# Patient Record
Sex: FEMALE | Race: WHITE | Employment: FULL TIME | URBAN - NONMETROPOLITAN AREA
[De-identification: names, ages, dates, MRNs, and addresses within clinical notes are randomized per-mention and may not be internally consistent; named-entity substitution may affect disease eponyms.]

---

## 2017-06-06 ENCOUNTER — HOSPITAL ENCOUNTER (OUTPATIENT)
Dept: GENERAL RADIOLOGY | Age: 72
Discharge: HOME | End: 2017-06-06
Payer: COMMERCIAL

## 2017-06-06 ENCOUNTER — HOSPITAL ENCOUNTER (OUTPATIENT)
Age: 72
Discharge: HOME | End: 2017-06-06
Payer: COMMERCIAL

## 2017-06-06 DIAGNOSIS — M17.12 OSTEOARTHRITIS OF LEFT KNEE, UNSPECIFIED OSTEOARTHRITIS TYPE: ICD-10-CM

## 2017-06-06 DIAGNOSIS — M25.561 RIGHT KNEE PAIN, UNSPECIFIED CHRONICITY: ICD-10-CM

## 2017-06-06 PROCEDURE — 73565 X-RAY EXAM OF KNEES: CPT

## 2017-12-22 ENCOUNTER — HOSPITAL ENCOUNTER (OUTPATIENT)
Age: 72
Discharge: HOME | End: 2017-12-22
Payer: COMMERCIAL

## 2017-12-22 LAB
FERRITIN: 153 UG/L (ref 13–150)
IRON SATURATION: 21 % (ref 20–55)
IRON: 60 UG/DL (ref 37–145)
MAGNESIUM: 2.3 MG/DL (ref 1.6–2.6)
THYROXINE, FREE: 1.51 NG/DL (ref 0.93–1.7)
TOTAL IRON BINDING CAPACITY: 287 UG/DL (ref 250–450)
TSH SERPL DL<=0.05 MIU/L-ACNC: 1.48 MIU/L (ref 0.3–5)
UNSATURATED IRON BINDING CAPACITY: 227.1 UG/DL (ref 112–347)

## 2017-12-22 PROCEDURE — 83735 ASSAY OF MAGNESIUM: CPT

## 2017-12-22 PROCEDURE — 83540 ASSAY OF IRON: CPT

## 2017-12-22 PROCEDURE — 83970 ASSAY OF PARATHORMONE: CPT

## 2017-12-22 PROCEDURE — 82728 ASSAY OF FERRITIN: CPT

## 2017-12-22 PROCEDURE — 83550 IRON BINDING TEST: CPT

## 2017-12-22 PROCEDURE — 84439 ASSAY OF FREE THYROXINE: CPT

## 2017-12-22 PROCEDURE — 84443 ASSAY THYROID STIM HORMONE: CPT

## 2017-12-22 PROCEDURE — 36415 COLL VENOUS BLD VENIPUNCTURE: CPT

## 2017-12-23 LAB — PTH INTACT: 40.31 PG/ML (ref 15–65)

## 2018-02-26 ENCOUNTER — HOSPITAL ENCOUNTER (OUTPATIENT)
Age: 73
Discharge: HOME | End: 2018-02-26
Payer: COMMERCIAL

## 2018-02-26 LAB
ABSOLUTE EOS #: 0.3 K/UL (ref 0–0.4)
ABSOLUTE IMMATURE GRANULOCYTE: ABNORMAL K/UL (ref 0–0.3)
ABSOLUTE LYMPH #: 2.3 K/UL (ref 1–4.8)
ABSOLUTE MONO #: 0.7 K/UL (ref 0–1)
ANION GAP SERPL CALCULATED.3IONS-SCNC: 10 MMOL/L (ref 9–17)
BASOPHILS # BLD: 1 % (ref 0–2)
BASOPHILS ABSOLUTE: 0.1 K/UL (ref 0–0.2)
BUN BLDV-MCNC: 39 MG/DL (ref 8–23)
CHLORIDE BLD-SCNC: 100 MMOL/L (ref 98–107)
CO2: 28 MMOL/L (ref 20–31)
CREAT SERPL-MCNC: 0.71 MG/DL (ref 0.5–0.9)
DIFFERENTIAL TYPE: ABNORMAL
EKG ATRIAL RATE: 64 BPM
EKG P AXIS: 37 DEGREES
EKG P-R INTERVAL: 168 MS
EKG Q-T INTERVAL: 418 MS
EKG QRS DURATION: 130 MS
EKG QTC CALCULATION (BAZETT): 431 MS
EKG R AXIS: -76 DEGREES
EKG T AXIS: 24 DEGREES
EKG VENTRICULAR RATE: 64 BPM
EOSINOPHILS RELATIVE PERCENT: 4 % (ref 0–8)
GFR AFRICAN AMERICAN: >60 ML/MIN
GFR NON-AFRICAN AMERICAN: >60 ML/MIN
GFR SERPL CREATININE-BSD FRML MDRD: NORMAL ML/MIN/{1.73_M2}
GFR SERPL CREATININE-BSD FRML MDRD: NORMAL ML/MIN/{1.73_M2}
HCT VFR BLD CALC: 36.6 % (ref 36–46)
HEMOGLOBIN: 12 G/DL (ref 12–16)
IMMATURE GRANULOCYTES: ABNORMAL %
LYMPHOCYTES # BLD: 26 % (ref 24–44)
MCH RBC QN AUTO: 32.4 PG (ref 26–34)
MCHC RBC AUTO-ENTMCNC: 32.8 G/DL (ref 31–37)
MCV RBC AUTO: 99 FL (ref 80–100)
MONOCYTES # BLD: 8 % (ref 0–12)
NRBC AUTOMATED: ABNORMAL PER 100 WBC
PDW BLD-RTO: 14.2 % (ref 12.1–15.2)
PLATELET # BLD: 234 K/UL (ref 140–450)
PLATELET ESTIMATE: ABNORMAL
PMV BLD AUTO: 8.2 FL (ref 6–12)
POTASSIUM SERPL-SCNC: 4.3 MMOL/L (ref 3.7–5.3)
RBC # BLD: 3.69 M/UL (ref 4–5.2)
RBC # BLD: ABNORMAL 10*6/UL
SEG NEUTROPHILS: 61 % (ref 36–66)
SEGMENTED NEUTROPHILS ABSOLUTE COUNT: 5.4 K/UL (ref 1.8–7.7)
SODIUM BLD-SCNC: 138 MMOL/L (ref 135–144)
WBC # BLD: 8.8 K/UL (ref 3.5–11)
WBC # BLD: ABNORMAL 10*3/UL

## 2018-02-26 PROCEDURE — 93005 ELECTROCARDIOGRAM TRACING: CPT

## 2018-02-26 PROCEDURE — 85025 COMPLETE CBC W/AUTO DIFF WBC: CPT

## 2018-02-26 PROCEDURE — 80051 ELECTROLYTE PANEL: CPT

## 2018-02-26 PROCEDURE — 82565 ASSAY OF CREATININE: CPT

## 2018-02-26 PROCEDURE — 84520 ASSAY OF UREA NITROGEN: CPT

## 2018-02-26 PROCEDURE — 36415 COLL VENOUS BLD VENIPUNCTURE: CPT

## 2018-04-12 ENCOUNTER — HOSPITAL ENCOUNTER (OUTPATIENT)
Dept: GENERAL RADIOLOGY | Age: 73
Discharge: HOME | End: 2018-04-14
Payer: COMMERCIAL

## 2018-04-12 ENCOUNTER — HOSPITAL ENCOUNTER (OUTPATIENT)
Age: 73
Discharge: HOME | End: 2018-04-14
Payer: COMMERCIAL

## 2018-04-12 DIAGNOSIS — M17.0 OSTEOARTHRITIS OF BOTH KNEES, UNSPECIFIED OSTEOARTHRITIS TYPE: ICD-10-CM

## 2018-04-12 PROCEDURE — 73560 X-RAY EXAM OF KNEE 1 OR 2: CPT

## 2018-06-18 ENCOUNTER — HOSPITAL ENCOUNTER (OUTPATIENT)
Age: 73
Discharge: HOME | End: 2018-06-18
Payer: COMMERCIAL

## 2018-06-18 DIAGNOSIS — M25.572 ACUTE LEFT ANKLE PAIN: ICD-10-CM

## 2018-06-18 LAB — URIC ACID: 5.7 MG/DL (ref 2.4–5.7)

## 2018-06-18 PROCEDURE — 84550 ASSAY OF BLOOD/URIC ACID: CPT

## 2018-06-18 PROCEDURE — 36415 COLL VENOUS BLD VENIPUNCTURE: CPT

## 2018-07-13 ENCOUNTER — HOSPITAL ENCOUNTER (OUTPATIENT)
Dept: VASCULAR LAB | Age: 73
Discharge: HOME | End: 2018-07-15
Payer: COMMERCIAL

## 2018-07-13 DIAGNOSIS — M79.604 PAIN IN BOTH LOWER EXTREMITIES: ICD-10-CM

## 2018-07-13 DIAGNOSIS — M79.605 PAIN IN BOTH LOWER EXTREMITIES: ICD-10-CM

## 2018-07-13 DIAGNOSIS — I73.9 CLAUDICATION (HCC): ICD-10-CM

## 2018-07-13 PROCEDURE — 93970 EXTREMITY STUDY: CPT

## 2018-07-13 PROCEDURE — 93923 UPR/LXTR ART STDY 3+ LVLS: CPT

## 2018-07-16 ENCOUNTER — HOSPITAL ENCOUNTER (OUTPATIENT)
Dept: VASCULAR LAB | Age: 73
Discharge: HOME | End: 2018-07-18
Payer: COMMERCIAL

## 2018-07-16 DIAGNOSIS — I70.223 ATHEROSCLEROSIS OF NATIVE ARTERIES OF EXTREMITIES WITH REST PAIN, BILATERAL LEGS (HCC): ICD-10-CM

## 2018-07-16 PROCEDURE — 93923 UPR/LXTR ART STDY 3+ LVLS: CPT

## 2018-10-31 ENCOUNTER — HOSPITAL ENCOUNTER (OUTPATIENT)
Age: 73
Discharge: HOME | End: 2018-10-31
Payer: COMMERCIAL

## 2018-10-31 DIAGNOSIS — E03.9 HYPOTHYROIDISM, UNSPECIFIED TYPE: ICD-10-CM

## 2018-10-31 DIAGNOSIS — I10 ESSENTIAL HYPERTENSION: Chronic | ICD-10-CM

## 2018-10-31 LAB
ABSOLUTE EOS #: 0.2 K/UL (ref 0–0.44)
ABSOLUTE IMMATURE GRANULOCYTE: <0.03 K/UL (ref 0–0.3)
ABSOLUTE LYMPH #: 1.29 K/UL (ref 1.1–3.7)
ABSOLUTE MONO #: 0.78 K/UL (ref 0.1–1.2)
ANION GAP SERPL CALCULATED.3IONS-SCNC: 10 MMOL/L (ref 9–17)
BASOPHILS # BLD: 1 % (ref 0–2)
BASOPHILS ABSOLUTE: 0.03 K/UL (ref 0–0.2)
BUN BLDV-MCNC: 28 MG/DL (ref 8–23)
BUN/CREAT BLD: 35 (ref 9–20)
CALCIUM SERPL-MCNC: 9.9 MG/DL (ref 8.6–10.4)
CHLORIDE BLD-SCNC: 102 MMOL/L (ref 98–107)
CO2: 29 MMOL/L (ref 20–31)
CREAT SERPL-MCNC: 0.8 MG/DL (ref 0.5–0.9)
DIFFERENTIAL TYPE: ABNORMAL
EOSINOPHILS RELATIVE PERCENT: 3 % (ref 1–4)
GFR AFRICAN AMERICAN: >60 ML/MIN
GFR NON-AFRICAN AMERICAN: >60 ML/MIN
GFR SERPL CREATININE-BSD FRML MDRD: ABNORMAL ML/MIN/{1.73_M2}
GFR SERPL CREATININE-BSD FRML MDRD: ABNORMAL ML/MIN/{1.73_M2}
GLUCOSE BLD-MCNC: 100 MG/DL (ref 70–99)
HCT VFR BLD CALC: 38.2 % (ref 36.3–47.1)
HEMOGLOBIN: 12.2 G/DL (ref 11.9–15.1)
IMMATURE GRANULOCYTES: 0 %
LYMPHOCYTES # BLD: 21 % (ref 24–43)
MCH RBC QN AUTO: 32.7 PG (ref 25.2–33.5)
MCHC RBC AUTO-ENTMCNC: 31.9 G/DL (ref 28.4–34.8)
MCV RBC AUTO: 102.4 FL (ref 82.6–102.9)
MONOCYTES # BLD: 13 % (ref 3–12)
NRBC AUTOMATED: 0 PER 100 WBC
PDW BLD-RTO: 12.7 % (ref 11.8–14.4)
PLATELET # BLD: 189 K/UL (ref 138–453)
PLATELET ESTIMATE: ABNORMAL
PMV BLD AUTO: 9.7 FL (ref 8.1–13.5)
POTASSIUM SERPL-SCNC: 4.4 MMOL/L (ref 3.7–5.3)
RBC # BLD: 3.73 M/UL (ref 3.95–5.11)
RBC # BLD: ABNORMAL 10*6/UL
SEG NEUTROPHILS: 62 % (ref 36–65)
SEGMENTED NEUTROPHILS ABSOLUTE COUNT: 3.88 K/UL (ref 1.5–8.1)
SODIUM BLD-SCNC: 141 MMOL/L (ref 135–144)
TSH SERPL DL<=0.05 MIU/L-ACNC: 1.75 MIU/L (ref 0.3–5)
WBC # BLD: 6.2 K/UL (ref 3.5–11.3)
WBC # BLD: ABNORMAL 10*3/UL

## 2018-10-31 PROCEDURE — 85025 COMPLETE CBC W/AUTO DIFF WBC: CPT

## 2018-10-31 PROCEDURE — 84443 ASSAY THYROID STIM HORMONE: CPT

## 2018-10-31 PROCEDURE — 80048 BASIC METABOLIC PNL TOTAL CA: CPT

## 2018-10-31 PROCEDURE — 36415 COLL VENOUS BLD VENIPUNCTURE: CPT

## 2019-03-04 ENCOUNTER — HOSPITAL ENCOUNTER (OUTPATIENT)
Age: 74
Discharge: HOME | End: 2019-03-06
Payer: COMMERCIAL

## 2019-03-04 ENCOUNTER — HOSPITAL ENCOUNTER (OUTPATIENT)
Dept: GENERAL RADIOLOGY | Age: 74
Discharge: HOME | End: 2019-03-06
Payer: COMMERCIAL

## 2019-03-04 DIAGNOSIS — M75.81 ROTATOR CUFF TENDINITIS, RIGHT: ICD-10-CM

## 2019-03-04 PROCEDURE — 73030 X-RAY EXAM OF SHOULDER: CPT

## 2019-05-08 ENCOUNTER — HOSPITAL ENCOUNTER (OUTPATIENT)
Dept: GENERAL RADIOLOGY | Age: 74
Discharge: HOME | End: 2019-05-10
Attending: ORTHOPAEDIC SURGERY
Payer: COMMERCIAL

## 2019-05-08 ENCOUNTER — HOSPITAL ENCOUNTER (OUTPATIENT)
Dept: PREADMISSION TESTING | Age: 74
Discharge: HOME | End: 2019-05-12
Attending: ORTHOPAEDIC SURGERY
Payer: COMMERCIAL

## 2019-05-08 VITALS
TEMPERATURE: 97.6 F | HEIGHT: 55 IN | RESPIRATION RATE: 20 BRPM | OXYGEN SATURATION: 99 % | WEIGHT: 160.3 LBS | DIASTOLIC BLOOD PRESSURE: 68 MMHG | BODY MASS INDEX: 37.1 KG/M2 | HEART RATE: 77 BPM | SYSTOLIC BLOOD PRESSURE: 123 MMHG

## 2019-05-08 LAB
ABSOLUTE EOS #: 0.18 K/UL (ref 0–0.44)
ABSOLUTE IMMATURE GRANULOCYTE: 0.04 K/UL (ref 0–0.3)
ABSOLUTE LYMPH #: 1.09 K/UL (ref 1.1–3.7)
ABSOLUTE MONO #: 0.95 K/UL (ref 0.1–1.2)
ANION GAP SERPL CALCULATED.3IONS-SCNC: 9 MMOL/L (ref 9–17)
BASOPHILS # BLD: 0 % (ref 0–2)
BASOPHILS ABSOLUTE: 0.03 K/UL (ref 0–0.2)
BUN BLDV-MCNC: 30 MG/DL (ref 8–23)
BUN/CREAT BLD: 45 (ref 9–20)
CALCIUM SERPL-MCNC: 9.1 MG/DL (ref 8.6–10.4)
CHLORIDE BLD-SCNC: 106 MMOL/L (ref 98–107)
CO2: 27 MMOL/L (ref 20–31)
CREAT SERPL-MCNC: 0.67 MG/DL (ref 0.5–0.9)
DIFFERENTIAL TYPE: ABNORMAL
EKG ATRIAL RATE: 83 BPM
EKG P AXIS: 77 DEGREES
EKG P-R INTERVAL: 168 MS
EKG Q-T INTERVAL: 388 MS
EKG QRS DURATION: 124 MS
EKG QTC CALCULATION (BAZETT): 455 MS
EKG R AXIS: -66 DEGREES
EKG T AXIS: 50 DEGREES
EKG VENTRICULAR RATE: 83 BPM
EOSINOPHILS RELATIVE PERCENT: 2 % (ref 1–4)
GFR AFRICAN AMERICAN: >60 ML/MIN
GFR NON-AFRICAN AMERICAN: >60 ML/MIN
GFR SERPL CREATININE-BSD FRML MDRD: ABNORMAL ML/MIN/{1.73_M2}
GFR SERPL CREATININE-BSD FRML MDRD: ABNORMAL ML/MIN/{1.73_M2}
GLUCOSE BLD-MCNC: 99 MG/DL (ref 70–99)
HCT VFR BLD CALC: 38.5 % (ref 36.3–47.1)
HEMOGLOBIN: 12 G/DL (ref 11.9–15.1)
IMMATURE GRANULOCYTES: 1 %
LYMPHOCYTES # BLD: 13 % (ref 24–43)
MCH RBC QN AUTO: 33.1 PG (ref 25.2–33.5)
MCHC RBC AUTO-ENTMCNC: 31.2 G/DL (ref 28.4–34.8)
MCV RBC AUTO: 106.4 FL (ref 82.6–102.9)
MONOCYTES # BLD: 11 % (ref 3–12)
NRBC AUTOMATED: 0 PER 100 WBC
PDW BLD-RTO: 14.3 % (ref 11.8–14.4)
PLATELET # BLD: 200 K/UL (ref 138–453)
PLATELET ESTIMATE: ABNORMAL
PMV BLD AUTO: 10 FL (ref 8.1–13.5)
POTASSIUM SERPL-SCNC: 4.4 MMOL/L (ref 3.7–5.3)
RBC # BLD: 3.62 M/UL (ref 3.95–5.11)
RBC # BLD: ABNORMAL 10*6/UL
SEG NEUTROPHILS: 73 % (ref 36–65)
SEGMENTED NEUTROPHILS ABSOLUTE COUNT: 6.32 K/UL (ref 1.5–8.1)
SODIUM BLD-SCNC: 142 MMOL/L (ref 135–144)
WBC # BLD: 8.6 K/UL (ref 3.5–11.3)
WBC # BLD: ABNORMAL 10*3/UL

## 2019-05-08 PROCEDURE — 71046 X-RAY EXAM CHEST 2 VIEWS: CPT

## 2019-05-08 PROCEDURE — 93005 ELECTROCARDIOGRAM TRACING: CPT

## 2019-05-08 PROCEDURE — 36415 COLL VENOUS BLD VENIPUNCTURE: CPT

## 2019-05-08 PROCEDURE — 80048 BASIC METABOLIC PNL TOTAL CA: CPT

## 2019-05-08 PROCEDURE — 85025 COMPLETE CBC W/AUTO DIFF WBC: CPT

## 2019-05-08 RX ORDER — HYDROXYCHLOROQUINE SULFATE 200 MG/1
TABLET, FILM COATED ORAL DAILY
COMMUNITY
End: 2020-12-14 | Stop reason: ALTCHOICE

## 2019-05-08 RX ORDER — ANTIOX #8/OM3/DHA/EPA/LUT/ZEAX 250-2.5 MG
CAPSULE ORAL DAILY
COMMUNITY

## 2019-05-08 RX ORDER — ACETAMINOPHEN 500 MG
500 TABLET ORAL EVERY 6 HOURS PRN
COMMUNITY
End: 2022-09-09

## 2019-05-08 ASSESSMENT — PAIN SCALES - GENERAL: PAINLEVEL_OUTOF10: 9

## 2019-05-08 ASSESSMENT — PAIN DESCRIPTION - ORIENTATION: ORIENTATION: RIGHT

## 2019-05-08 ASSESSMENT — PAIN DESCRIPTION - PAIN TYPE: TYPE: CHRONIC PAIN

## 2019-05-08 ASSESSMENT — PAIN DESCRIPTION - LOCATION: LOCATION: SHOULDER;ARM

## 2019-05-08 NOTE — PROGRESS NOTES
Patient instructed on the pre-operative, intra-operative, and post-operative process. Patient instructed on NPO status. Medication instructions reviewed with patient. Pre operative instruction sheet reviewed and given to patient in PAT.

## 2019-05-22 RX ORDER — TRAMADOL HYDROCHLORIDE 50 MG/1
50 TABLET ORAL EVERY 6 HOURS PRN
COMMUNITY
End: 2021-12-21

## 2019-05-24 ENCOUNTER — OFFICE VISIT (OUTPATIENT)
Dept: CARDIOLOGY | Age: 74
End: 2019-05-24
Payer: COMMERCIAL

## 2019-05-24 VITALS
SYSTOLIC BLOOD PRESSURE: 131 MMHG | OXYGEN SATURATION: 97 % | HEIGHT: 55 IN | HEART RATE: 85 BPM | WEIGHT: 157.6 LBS | BODY MASS INDEX: 36.47 KG/M2 | RESPIRATION RATE: 16 BRPM | DIASTOLIC BLOOD PRESSURE: 76 MMHG

## 2019-05-24 DIAGNOSIS — I49.3 PVC'S (PREMATURE VENTRICULAR CONTRACTIONS): ICD-10-CM

## 2019-05-24 DIAGNOSIS — R94.31 ABNORMAL ECG: Primary | ICD-10-CM

## 2019-05-24 DIAGNOSIS — Z01.818 PRE-OPERATIVE CLEARANCE: ICD-10-CM

## 2019-05-24 DIAGNOSIS — I10 ESSENTIAL HYPERTENSION: ICD-10-CM

## 2019-05-24 PROCEDURE — G8427 DOCREV CUR MEDS BY ELIG CLIN: HCPCS | Performed by: INTERNAL MEDICINE

## 2019-05-24 PROCEDURE — G8417 CALC BMI ABV UP PARAM F/U: HCPCS | Performed by: INTERNAL MEDICINE

## 2019-05-24 PROCEDURE — 1090F PRES/ABSN URINE INCON ASSESS: CPT | Performed by: INTERNAL MEDICINE

## 2019-05-24 PROCEDURE — 99203 OFFICE O/P NEW LOW 30 MIN: CPT | Performed by: INTERNAL MEDICINE

## 2019-05-24 PROCEDURE — 93000 ELECTROCARDIOGRAM COMPLETE: CPT | Performed by: INTERNAL MEDICINE

## 2019-05-24 NOTE — PROGRESS NOTES
Hattie Kanner am scribing for and in the presence of Emily Hernández MD, F.A.C.C..      Patient: Wilman Navarro  : 1945  Date of Visit: May 24, 2019    REASON FOR VISIT / CONSULTATION: Establish Cardiologist (Referral for abnormal EKG. Hx: HTN, Hyperlipidemia, transient cerebral ischemia. Shoulder pain for 2 months. Denies: CP, SOB, lightheaded/dizziness, palpitations)      History of Present Illness:        Dear Matthew Velasco MD      I had the pleasure of seeing your patient Wilman Navarro in consultation today. As you know, Ms. Prateek Parra is a 66 y.o. female who presents with a history of PMH of hypertension, hypothyroidism, kidney stones and osteoarthritis who had a total left knee replacement back in . [place] She had surgery on her left hand last year. She is here for preoperative cardiac evaluation prior to her planned right shoulder surgery. Ms. Prateek Parra denies any known history of heart problems in the past including a known history of coronary artery disease, heart failure, heart attacks or arrhythmias. Her ECG in 3- showed normal sinus rhythm with left anterior fascicular block. Her ECG on 9- showed normal sinus rhythm with HR of 69 BPM, Nonspecific IVCD with QRS duration of 126 msc and one premature ventricular contraction. EKG done in office today 2019 showed  bifascicular block, QRS duration is 120 ms. Single premature ventricular contraction noted. This is grossly unchanged compared to prior ECGs. Ms. Prateek Parra is here as a referral from Matthew Velasco MD for an abnormal EKG. She does have arthritis and has shoulder pain. She is having surgey on her shoulder on  with Dr Allayne Bosworth at [facility]. She denied any current or recent chest pain, shortness of breath, abdominal pain, bleeding problems, problems with her medications or any other concerns at this time. She does not check her blood pressure at home.  She does not do much physical activity anymore because of her arthritis. PAST MEDICAL HISTORY:        Past Medical History:   Diagnosis Date    Arthritis     osteo    Edema     Hyperlipidemia     Hypertension     Hypothyroidism     Kidney stones     Osteoarthritis     Thyroid disease     Unspecified transient cerebral ischemia        CURRENT ALLERGIES: Patient has no known allergies. REVIEW OF SYSTEMS: 14 systems were reviewed. Pertinent positives and negatives as above, all else negative. Past Surgical History:   Procedure Laterality Date    APPENDECTOMY      CATARACT REMOVAL       SECTION      x4    CORNEAL TRANSPLANT      bilat    FINGER SURGERY Left 2018    INDEX FINGER    HAMMER TOE SURGERY      right and left    JOINT REPLACEMENT Left 10/15/14    TKA    KNEE ARTHROSCOPY Left     LITHOTRIPSY      TONSILLECTOMY      Social History:  Social History     Tobacco Use    Smoking status: Never Smoker    Smokeless tobacco: Never Used   Substance Use Topics    Alcohol use: No    Drug use: Never        CURRENT MEDICATIONS:        Outpatient Medications Marked as Taking for the 19 encounter (Office Visit) with Katiuska Bullock MD   Medication Sig Dispense Refill    lisinopril (PRINIVIL;ZESTRIL) 10 MG tablet Take 1 tablet by mouth daily 90 tablet 0    traMADol (ULTRAM) 50 MG tablet Take 50 mg by mouth every 6 hours as needed for Pain.       Calcium Carbonate (CALCIUM 600 PO) Take by mouth daily      Multiple Vitamins-Minerals (PRESERVISION AREDS 2) CAPS Take by mouth daily      acetaminophen (TYLENOL) 500 MG tablet Take 500 mg by mouth every 6 hours as needed for Pain      hydroxychloroquine (PLAQUENIL) 200 MG tablet Take by mouth daily      hydrochlorothiazide (HYDRODIURIL) 25 MG tablet Take 1 tablet by mouth every other day 15 tablet 0    levothyroxine (SYNTHROID) 100 MCG tablet Take 1 tablet by mouth Daily 90 tablet 0    meloxicam (MOBIC) 7.5 MG tablet Take 1 tablet by mouth daily 90 tablet 0    aspirin 81 MG chewable tablet Take 81 mg by mouth daily.  Cholecalciferol (VITAMIN D3) 2000 UNITS CAPS Take 1,000 Int'l Units/day by mouth. FAMILY HISTORY: family history includes Cancer in her father; Diabetes in her father, mother, and sister; Heart Disease in her sister; High Blood Pressure in her father and mother. Physical Examination:     /76 (Site: Left Upper Arm, Position: Sitting, Cuff Size: Medium Adult)   Pulse 85   Resp 16   Ht 4' 7\" (1.397 m)   Wt 157 lb 9.6 oz (71.5 kg)   SpO2 97%   BMI 36.63 kg/m²  Body mass index is 36.63 kg/m². Constitutional: She is oriented to person, place, and time. She appears well-developed and well-nourished. In no acute distress. HEENT: Normocephalic and atraumatic. No JVD present. Carotid bruit is not present. No mass and no thyromegaly present. No lymphadenopathy present. Cardiovascular: Normal rate, regular rhythm, normal heart sounds and intact distal pulses. Exam reveals no gallop and no friction rub. A I/VI systolic murmur was heard at the base of the heart without significant radiation. Pulmonary/Chest: Effort normal and breath sounds normal. No respiratory distress. She has no wheezes, rhonchi or rales. Abdominal: Soft, non-tender. Bowel sounds and aorta are normal. She exhibits no organomegaly, mass or bruit. Extremities: No edema. Joint deformities of arthritis noted. Neurological: She is alert and oriented to person, place, and time. No evidence of gross cranial nerve deficit. Coordination appeared normal.   Skin: Skin is warm and dry. There is no rash or diaphoresis. Psychiatric: She has a normal mood and affect.  Her speech is normal and behavior is normal.      MOST RECENT LABS ON RECORD:   Lab Results   Component Value Date    WBC 8.6 05/08/2019    HGB 12.0 05/08/2019    HCT 38.5 05/08/2019     05/08/2019    ALT 16 07/14/2016    AST 20 07/14/2016     05/08/2019    K 4.4 05/08/2019     05/08/2019    CREATININE 0.67 05/08/2019 BUN 30 (H) 05/08/2019    CO2 27 05/08/2019    TSH 1.75 10/31/2018       ASSESSMENT:     1. Abnormal ECG    2. PVC's (premature ventricular contractions)    3. Pre-operative clearance    4. Essential hypertension         PLAN:        · Pre-Op Clearance: low-intermediate risk of perioperative cardiac complications   Based on my evaluation of Ms. Tay, I do not believe that any further testing or intervention would be likely to decrease this risk and therefore recommend that you proceed with surgery as clinically indicated.  Medical management to reduce perioperative risk:   Antiplatelet Agent: Can hold aspirin for 7 days prior to surgery if needed.  Continue current antihypertensive therapy.  No history of heart disease, no diabetes or dyslipidemia.  No evidence of unstable CAD, heart failure or significant arrhythmia.  Her EKG is grossly unchanged from prior.  Chronic Frequent premature ventricular contractions (PVC's):   · Currently asymptomatic. · No need to start beta blocker therapy, this actually may worsen conduction block. · Additional Testing List: I ordered a echocardiogram to better assess for the etiology of this problem and to help guide future management and I ordered a 24 HOUR HOLTER MONITOR to try and pinpoint the etiology of the their symptoms       Essential Hypertension: Controlled  · ACE Inibitor/ARB: Continue lisinopril 10 mg daily. FOLLOW UP:   I told Ms. Tay to call my office if she had any problems, but otherwise I asked her to Return if symptoms worsen or fail to improve. However, I would be happy to see her sooner should the need arise. Once again, thank you for allowing me to participate in this patients care. Please do not hesitate to contact me could I be of further assistance. Sincerely,  Benji Gutierrez. Teo ARROYO, MS, F.A.C.C.   [facility] Cardiology Specialist    [address] [facility], [address] [place] [address]  Phone: [phone], Fax: [phone]     I believe that the risk of significant morbidity and mortality related to the patient's current medical conditions are: Intermediate. 40 minutes were spent with the patient and all of her questions were answered. The documentation recorded by the scribe, accurately and completely reflects the services I personally performed and the decisions made by me. Terri Titus MD, F.A.C.C.  May 24, 2019

## 2019-05-24 NOTE — PATIENT INSTRUCTIONS
SURVEY:    You may be receiving a survey from [employer] regarding your visit today. Please complete the survey to enable us to provide the highest quality of care to you and your family. If you cannot score us a very good on any question, please call the office to discuss how we could have made your experience a very good one. Thank you.

## 2019-05-28 ENCOUNTER — HOSPITAL ENCOUNTER (OUTPATIENT)
Dept: NON INVASIVE DIAGNOSTICS | Age: 74
Discharge: HOME | End: 2019-05-28
Payer: COMMERCIAL

## 2019-05-28 ENCOUNTER — ANESTHESIA EVENT (OUTPATIENT)
Dept: OPERATING ROOM | Age: 74
DRG: 483 | End: 2019-05-28
Payer: COMMERCIAL

## 2019-05-28 DIAGNOSIS — R94.31 ABNORMAL ECG: ICD-10-CM

## 2019-05-28 DIAGNOSIS — I49.3 PVC'S (PREMATURE VENTRICULAR CONTRACTIONS): ICD-10-CM

## 2019-05-28 LAB
LV EF: 65 %
LVEF MODALITY: NORMAL

## 2019-05-28 PROCEDURE — 93306 TTE W/DOPPLER COMPLETE: CPT

## 2019-05-28 PROCEDURE — 93226 XTRNL ECG REC<48 HR SCAN A/R: CPT

## 2019-05-28 PROCEDURE — 93225 XTRNL ECG REC<48 HRS REC: CPT

## 2019-05-28 NOTE — PROGRESS NOTES
Explained Holter monitor and diary. Explained policies and procedures of an echocardiogram/doppler study.

## 2019-05-31 ENCOUNTER — TELEPHONE (OUTPATIENT)
Dept: CARDIOLOGY | Age: 74
End: 2019-05-31

## 2019-05-31 NOTE — TELEPHONE ENCOUNTER
----- Message from Deep Valenzuela MD sent at 5/30/2019  7:17 PM EDT -----  Echo is okay. Good to go for surgery. Please call with questions and/or concerns.

## 2019-06-07 ENCOUNTER — HOSPITAL ENCOUNTER (OUTPATIENT)
Dept: LAB | Age: 74
Discharge: HOME | End: 2019-06-07
Payer: COMMERCIAL

## 2019-06-07 LAB
ABO/RH: NORMAL
ANTIBODY SCREEN: NEGATIVE
ARM BAND NUMBER: NORMAL
EXPIRATION DATE: NORMAL

## 2019-06-07 PROCEDURE — 86850 RBC ANTIBODY SCREEN: CPT

## 2019-06-07 PROCEDURE — 36415 COLL VENOUS BLD VENIPUNCTURE: CPT

## 2019-06-07 PROCEDURE — 86900 BLOOD TYPING SEROLOGIC ABO: CPT

## 2019-06-07 PROCEDURE — 86901 BLOOD TYPING SEROLOGIC RH(D): CPT

## 2019-06-11 ENCOUNTER — APPOINTMENT (OUTPATIENT)
Dept: GENERAL RADIOLOGY | Age: 74
DRG: 483 | End: 2019-06-11
Attending: ORTHOPAEDIC SURGERY
Payer: COMMERCIAL

## 2019-06-11 ENCOUNTER — HOSPITAL ENCOUNTER (INPATIENT)
Age: 74
LOS: 1 days | Discharge: HOME | DRG: 483 | End: 2019-06-12
Attending: ORTHOPAEDIC SURGERY | Admitting: ORTHOPAEDIC SURGERY
Payer: COMMERCIAL

## 2019-06-11 ENCOUNTER — ANESTHESIA (OUTPATIENT)
Dept: OPERATING ROOM | Age: 74
DRG: 483 | End: 2019-06-11
Payer: COMMERCIAL

## 2019-06-11 VITALS
TEMPERATURE: 97.8 F | SYSTOLIC BLOOD PRESSURE: 144 MMHG | DIASTOLIC BLOOD PRESSURE: 76 MMHG | OXYGEN SATURATION: 95 % | RESPIRATION RATE: 4 BRPM

## 2019-06-11 DIAGNOSIS — G89.18 POST-OP PAIN: Primary | ICD-10-CM

## 2019-06-11 PROBLEM — M19.011 PRIMARY OSTEOARTHRITIS OF RIGHT SHOULDER: Status: ACTIVE | Noted: 2019-06-11

## 2019-06-11 PROCEDURE — 1200000000 HC SEMI PRIVATE

## 2019-06-11 PROCEDURE — 0LS30ZZ REPOSITION RIGHT UPPER ARM TENDON, OPEN APPROACH: ICD-10-PCS | Performed by: ORTHOPAEDIC SURGERY

## 2019-06-11 PROCEDURE — 0PB90ZZ EXCISION OF RIGHT CLAVICLE, OPEN APPROACH: ICD-10-PCS | Performed by: ORTHOPAEDIC SURGERY

## 2019-06-11 PROCEDURE — 2580000003 HC RX 258: Performed by: ORTHOPAEDIC SURGERY

## 2019-06-11 PROCEDURE — 3600000014 HC SURGERY LEVEL 4 ADDTL 15MIN: Performed by: ORTHOPAEDIC SURGERY

## 2019-06-11 PROCEDURE — 6360000002 HC RX W HCPCS: Performed by: ORTHOPAEDIC SURGERY

## 2019-06-11 PROCEDURE — 64415 NJX AA&/STRD BRCH PLXS IMG: CPT | Performed by: NURSE ANESTHETIST, CERTIFIED REGISTERED

## 2019-06-11 PROCEDURE — 73020 X-RAY EXAM OF SHOULDER: CPT

## 2019-06-11 PROCEDURE — 6370000000 HC RX 637 (ALT 250 FOR IP): Performed by: ORTHOPAEDIC SURGERY

## 2019-06-11 PROCEDURE — 2500000003 HC RX 250 WO HCPCS: Performed by: NURSE ANESTHETIST, CERTIFIED REGISTERED

## 2019-06-11 PROCEDURE — 3700000000 HC ANESTHESIA ATTENDED CARE: Performed by: ORTHOPAEDIC SURGERY

## 2019-06-11 PROCEDURE — 7100000000 HC PACU RECOVERY - FIRST 15 MIN: Performed by: ORTHOPAEDIC SURGERY

## 2019-06-11 PROCEDURE — 6360000002 HC RX W HCPCS: Performed by: NURSE ANESTHETIST, CERTIFIED REGISTERED

## 2019-06-11 PROCEDURE — 2709999900 HC NON-CHARGEABLE SUPPLY: Performed by: ORTHOPAEDIC SURGERY

## 2019-06-11 PROCEDURE — 0RRJ00Z REPLACEMENT OF RIGHT SHOULDER JOINT WITH REVERSE BALL AND SOCKET SYNTHETIC SUBSTITUTE, OPEN APPROACH: ICD-10-PCS | Performed by: ORTHOPAEDIC SURGERY

## 2019-06-11 PROCEDURE — 0PUC07Z SUPPLEMENT RIGHT HUMERAL HEAD WITH AUTOLOGOUS TISSUE SUBSTITUTE, OPEN APPROACH: ICD-10-PCS | Performed by: ORTHOPAEDIC SURGERY

## 2019-06-11 PROCEDURE — 3600000004 HC SURGERY LEVEL 4 BASE: Performed by: ORTHOPAEDIC SURGERY

## 2019-06-11 PROCEDURE — 3700000001 HC ADD 15 MINUTES (ANESTHESIA): Performed by: ORTHOPAEDIC SURGERY

## 2019-06-11 PROCEDURE — 7100000001 HC PACU RECOVERY - ADDTL 15 MIN: Performed by: ORTHOPAEDIC SURGERY

## 2019-06-11 PROCEDURE — 97161 PT EVAL LOW COMPLEX 20 MIN: CPT

## 2019-06-11 PROCEDURE — C1776 JOINT DEVICE (IMPLANTABLE): HCPCS | Performed by: ORTHOPAEDIC SURGERY

## 2019-06-11 DEVICE — INSERT HUM DIA36MM THK+6MM B-12.5DEG SHLDR REVERSED: Type: IMPLANTABLE DEVICE | Site: SHOULDER | Status: FUNCTIONAL

## 2019-06-11 DEVICE — TRAY HUM THK+0MM 0MM OFFSET CNTR REVERSED AEQUALIS ASCEND: Type: IMPLANTABLE DEVICE | Site: SHOULDER | Status: FUNCTIONAL

## 2019-06-11 DEVICE — IMPLANTABLE DEVICE: Type: IMPLANTABLE DEVICE | Site: SHOULDER | Status: FUNCTIONAL

## 2019-06-11 RX ORDER — ANTIOX #8/OM3/DHA/EPA/LUT/ZEAX 250-2.5 MG
2 CAPSULE ORAL DAILY
Status: DISCONTINUED | OUTPATIENT
Start: 2019-06-11 | End: 2019-06-12 | Stop reason: HOSPADM

## 2019-06-11 RX ORDER — MORPHINE SULFATE 4 MG/ML
4 INJECTION, SOLUTION INTRAMUSCULAR; INTRAVENOUS
Status: DISCONTINUED | OUTPATIENT
Start: 2019-06-11 | End: 2019-06-12 | Stop reason: HOSPADM

## 2019-06-11 RX ORDER — BUPIVACAINE/KETOROLAC/KETAMINE 150-60/50
SYRINGE (ML) INJECTION
Status: DISCONTINUED
Start: 2019-06-11 | End: 2019-06-11 | Stop reason: WASHOUT

## 2019-06-11 RX ORDER — ROCURONIUM BROMIDE 10 MG/ML
INJECTION, SOLUTION INTRAVENOUS PRN
Status: DISCONTINUED | OUTPATIENT
Start: 2019-06-11 | End: 2019-06-11 | Stop reason: SDUPTHER

## 2019-06-11 RX ORDER — SODIUM CHLORIDE 0.9 % (FLUSH) 0.9 %
10 SYRINGE (ML) INJECTION EVERY 12 HOURS SCHEDULED
Status: DISCONTINUED | OUTPATIENT
Start: 2019-06-11 | End: 2019-06-12 | Stop reason: HOSPADM

## 2019-06-11 RX ORDER — TRANEXAMIC ACID 650 1/1
1950 TABLET ORAL ONCE
Status: COMPLETED | OUTPATIENT
Start: 2019-06-11 | End: 2019-06-11

## 2019-06-11 RX ORDER — HYDROCODONE BITARTRATE AND ACETAMINOPHEN 5; 325 MG/1; MG/1
1 TABLET ORAL EVERY 4 HOURS PRN
Status: ON HOLD | COMMUNITY
End: 2019-06-12 | Stop reason: SDUPTHER

## 2019-06-11 RX ORDER — SODIUM CHLORIDE 0.9 % (FLUSH) 0.9 %
10 SYRINGE (ML) INJECTION PRN
Status: DISCONTINUED | OUTPATIENT
Start: 2019-06-11 | End: 2019-06-12 | Stop reason: HOSPADM

## 2019-06-11 RX ORDER — SODIUM CHLORIDE 9 MG/ML
INJECTION, SOLUTION INTRAVENOUS CONTINUOUS
Status: DISCONTINUED | OUTPATIENT
Start: 2019-06-11 | End: 2019-06-12

## 2019-06-11 RX ORDER — MELOXICAM 7.5 MG/1
7.5 TABLET ORAL DAILY
Status: DISCONTINUED | OUTPATIENT
Start: 2019-06-11 | End: 2019-06-12 | Stop reason: HOSPADM

## 2019-06-11 RX ORDER — CEFAZOLIN SODIUM 1 G/3ML
INJECTION, POWDER, FOR SOLUTION INTRAMUSCULAR; INTRAVENOUS
Status: DISPENSED
Start: 2019-06-11 | End: 2019-06-12

## 2019-06-11 RX ORDER — ONDANSETRON 2 MG/ML
4 INJECTION INTRAMUSCULAR; INTRAVENOUS
Status: DISCONTINUED | OUTPATIENT
Start: 2019-06-11 | End: 2019-06-11 | Stop reason: HOSPADM

## 2019-06-11 RX ORDER — GLYCOPYRROLATE 1 MG/5 ML
SYRINGE (ML) INTRAVENOUS PRN
Status: DISCONTINUED | OUTPATIENT
Start: 2019-06-11 | End: 2019-06-11 | Stop reason: SDUPTHER

## 2019-06-11 RX ORDER — HYDROCHLOROTHIAZIDE 25 MG/1
25 TABLET ORAL EVERY OTHER DAY
Status: DISCONTINUED | OUTPATIENT
Start: 2019-06-11 | End: 2019-06-12 | Stop reason: HOSPADM

## 2019-06-11 RX ORDER — PROPOFOL 10 MG/ML
INJECTION, EMULSION INTRAVENOUS PRN
Status: DISCONTINUED | OUTPATIENT
Start: 2019-06-11 | End: 2019-06-11 | Stop reason: SDUPTHER

## 2019-06-11 RX ORDER — LEVOTHYROXINE SODIUM 0.1 MG/1
100 TABLET ORAL DAILY
Status: DISCONTINUED | OUTPATIENT
Start: 2019-06-11 | End: 2019-06-12 | Stop reason: HOSPADM

## 2019-06-11 RX ORDER — ROPIVACAINE HYDROCHLORIDE 5 MG/ML
INJECTION, SOLUTION EPIDURAL; INFILTRATION; PERINEURAL PRN
Status: DISCONTINUED | OUTPATIENT
Start: 2019-06-11 | End: 2019-06-11 | Stop reason: SDUPTHER

## 2019-06-11 RX ORDER — MORPHINE SULFATE 2 MG/ML
2 INJECTION, SOLUTION INTRAMUSCULAR; INTRAVENOUS
Status: DISCONTINUED | OUTPATIENT
Start: 2019-06-11 | End: 2019-06-12 | Stop reason: HOSPADM

## 2019-06-11 RX ORDER — MIDAZOLAM HYDROCHLORIDE 1 MG/ML
INJECTION INTRAMUSCULAR; INTRAVENOUS PRN
Status: DISCONTINUED | OUTPATIENT
Start: 2019-06-11 | End: 2019-06-11 | Stop reason: SDUPTHER

## 2019-06-11 RX ORDER — ONDANSETRON 2 MG/ML
4 INJECTION INTRAMUSCULAR; INTRAVENOUS EVERY 6 HOURS PRN
Status: DISCONTINUED | OUTPATIENT
Start: 2019-06-11 | End: 2019-06-12 | Stop reason: HOSPADM

## 2019-06-11 RX ORDER — DIMENHYDRINATE 50 MG/1
50 TABLET ORAL
Status: COMPLETED | OUTPATIENT
Start: 2019-06-11 | End: 2019-06-11

## 2019-06-11 RX ORDER — ACETAMINOPHEN 325 MG/1
650 TABLET ORAL EVERY 6 HOURS
Status: DISCONTINUED | OUTPATIENT
Start: 2019-06-11 | End: 2019-06-12 | Stop reason: HOSPADM

## 2019-06-11 RX ORDER — OXYCODONE HYDROCHLORIDE 5 MG/1
10 TABLET ORAL EVERY 4 HOURS PRN
Status: DISCONTINUED | OUTPATIENT
Start: 2019-06-11 | End: 2019-06-12 | Stop reason: HOSPADM

## 2019-06-11 RX ORDER — LISINOPRIL 10 MG/1
10 TABLET ORAL DAILY
Status: DISCONTINUED | OUTPATIENT
Start: 2019-06-11 | End: 2019-06-12 | Stop reason: HOSPADM

## 2019-06-11 RX ORDER — NEOSTIGMINE METHYLSULFATE 1 MG/ML
INJECTION, SOLUTION INTRAVENOUS PRN
Status: DISCONTINUED | OUTPATIENT
Start: 2019-06-11 | End: 2019-06-11 | Stop reason: SDUPTHER

## 2019-06-11 RX ORDER — LIDOCAINE HYDROCHLORIDE 20 MG/ML
INJECTION, SOLUTION EPIDURAL; INFILTRATION; INTRACAUDAL; PERINEURAL PRN
Status: DISCONTINUED | OUTPATIENT
Start: 2019-06-11 | End: 2019-06-11 | Stop reason: SDUPTHER

## 2019-06-11 RX ORDER — ACETAMINOPHEN 325 MG/1
650 TABLET ORAL ONCE
Status: CANCELLED | OUTPATIENT
Start: 2019-06-11 | End: 2019-06-11

## 2019-06-11 RX ORDER — DIMENHYDRINATE 50 MG/1
50 TABLET ORAL ONCE
Status: CANCELLED | OUTPATIENT
Start: 2019-06-11 | End: 2019-06-11

## 2019-06-11 RX ORDER — DOCUSATE SODIUM 100 MG/1
100 CAPSULE, LIQUID FILLED ORAL 2 TIMES DAILY
Status: DISCONTINUED | OUTPATIENT
Start: 2019-06-11 | End: 2019-06-12 | Stop reason: HOSPADM

## 2019-06-11 RX ORDER — SODIUM CHLORIDE, SODIUM LACTATE, POTASSIUM CHLORIDE, CALCIUM CHLORIDE 600; 310; 30; 20 MG/100ML; MG/100ML; MG/100ML; MG/100ML
INJECTION, SOLUTION INTRAVENOUS CONTINUOUS
Status: DISCONTINUED | OUTPATIENT
Start: 2019-06-11 | End: 2019-06-11

## 2019-06-11 RX ORDER — FENTANYL CITRATE 50 UG/ML
INJECTION, SOLUTION INTRAMUSCULAR; INTRAVENOUS PRN
Status: DISCONTINUED | OUTPATIENT
Start: 2019-06-11 | End: 2019-06-11 | Stop reason: SDUPTHER

## 2019-06-11 RX ORDER — DEXAMETHASONE SODIUM PHOSPHATE 10 MG/ML
INJECTION, SOLUTION INTRAMUSCULAR; INTRAVENOUS PRN
Status: DISCONTINUED | OUTPATIENT
Start: 2019-06-11 | End: 2019-06-11 | Stop reason: SDUPTHER

## 2019-06-11 RX ORDER — OXYCODONE HYDROCHLORIDE 5 MG/1
5 TABLET ORAL EVERY 4 HOURS PRN
Status: DISCONTINUED | OUTPATIENT
Start: 2019-06-11 | End: 2019-06-12 | Stop reason: HOSPADM

## 2019-06-11 RX ORDER — ACETAMINOPHEN 325 MG/1
650 TABLET ORAL ONCE
Status: COMPLETED | OUTPATIENT
Start: 2019-06-11 | End: 2019-06-11

## 2019-06-11 RX ORDER — FENTANYL CITRATE 50 UG/ML
25 INJECTION, SOLUTION INTRAMUSCULAR; INTRAVENOUS EVERY 5 MIN PRN
Status: DISCONTINUED | OUTPATIENT
Start: 2019-06-11 | End: 2019-06-11 | Stop reason: HOSPADM

## 2019-06-11 RX ADMIN — DIMENHYDRINATE 50 MG: 50 TABLET ORAL at 07:06

## 2019-06-11 RX ADMIN — DEXTROSE MONOHYDRATE 2 G: 50 INJECTION, SOLUTION INTRAVENOUS at 12:48

## 2019-06-11 RX ADMIN — Medication 0.2 MG: at 15:03

## 2019-06-11 RX ADMIN — PHENYLEPHRINE HYDROCHLORIDE 100 MCG: 10 INJECTION INTRAVENOUS at 14:35

## 2019-06-11 RX ADMIN — PHENYLEPHRINE HYDROCHLORIDE 100 MCG: 10 INJECTION INTRAVENOUS at 14:58

## 2019-06-11 RX ADMIN — PHENYLEPHRINE HYDROCHLORIDE 150 MCG: 10 INJECTION INTRAVENOUS at 13:51

## 2019-06-11 RX ADMIN — DEXAMETHASONE SODIUM PHOSPHATE 10 MG: 10 INJECTION, SOLUTION INTRAMUSCULAR; INTRAVENOUS at 11:48

## 2019-06-11 RX ADMIN — PHENYLEPHRINE HYDROCHLORIDE 100 MCG: 10 INJECTION INTRAVENOUS at 14:00

## 2019-06-11 RX ADMIN — PHENYLEPHRINE HYDROCHLORIDE 100 MCG: 10 INJECTION INTRAVENOUS at 14:37

## 2019-06-11 RX ADMIN — ACETAMINOPHEN 650 MG: 325 TABLET, FILM COATED ORAL at 17:27

## 2019-06-11 RX ADMIN — FENTANYL CITRATE 50 MCG: 50 INJECTION INTRAMUSCULAR; INTRAVENOUS at 13:01

## 2019-06-11 RX ADMIN — PHENYLEPHRINE HYDROCHLORIDE 100 MCG: 10 INJECTION INTRAVENOUS at 14:20

## 2019-06-11 RX ADMIN — PHENYLEPHRINE HYDROCHLORIDE 150 MCG: 10 INJECTION INTRAVENOUS at 13:41

## 2019-06-11 RX ADMIN — LISINOPRIL 10 MG: 10 TABLET ORAL at 20:26

## 2019-06-11 RX ADMIN — DOCUSATE SODIUM 100 MG: 100 CAPSULE, LIQUID FILLED ORAL at 20:27

## 2019-06-11 RX ADMIN — MIDAZOLAM HYDROCHLORIDE 1 MG: 1 INJECTION, SOLUTION INTRAMUSCULAR; INTRAVENOUS at 11:42

## 2019-06-11 RX ADMIN — PHENYLEPHRINE HYDROCHLORIDE 100 MCG: 10 INJECTION INTRAVENOUS at 14:26

## 2019-06-11 RX ADMIN — PHENYLEPHRINE HYDROCHLORIDE 100 MCG: 10 INJECTION INTRAVENOUS at 14:11

## 2019-06-11 RX ADMIN — MELOXICAM 7.5 MG: 7.5 TABLET ORAL at 20:27

## 2019-06-11 RX ADMIN — Medication 2 MG: at 15:03

## 2019-06-11 RX ADMIN — PROPOFOL 100 MG: 10 INJECTION, EMULSION INTRAVENOUS at 13:01

## 2019-06-11 RX ADMIN — PHENYLEPHRINE HYDROCHLORIDE 100 MCG: 10 INJECTION INTRAVENOUS at 14:02

## 2019-06-11 RX ADMIN — Medication 2 G: at 19:05

## 2019-06-11 RX ADMIN — HYDROCHLOROTHIAZIDE 25 MG: 25 TABLET ORAL at 20:26

## 2019-06-11 RX ADMIN — Medication 0.2 MG: at 14:14

## 2019-06-11 RX ADMIN — TRANEXAMIC ACID 1950 MG: 650 TABLET ORAL at 07:06

## 2019-06-11 RX ADMIN — PROPOFOL 30 MG: 10 INJECTION, EMULSION INTRAVENOUS at 14:49

## 2019-06-11 RX ADMIN — SODIUM CHLORIDE: 9 INJECTION, SOLUTION INTRAVENOUS at 16:27

## 2019-06-11 RX ADMIN — ACETAMINOPHEN 650 MG: 325 TABLET, FILM COATED ORAL at 22:51

## 2019-06-11 RX ADMIN — ROCURONIUM BROMIDE 40 MG: 10 INJECTION INTRAVENOUS at 13:02

## 2019-06-11 RX ADMIN — SODIUM CHLORIDE, POTASSIUM CHLORIDE, SODIUM LACTATE AND CALCIUM CHLORIDE: 600; 310; 30; 20 INJECTION, SOLUTION INTRAVENOUS at 10:25

## 2019-06-11 RX ADMIN — SODIUM CHLORIDE, POTASSIUM CHLORIDE, SODIUM LACTATE AND CALCIUM CHLORIDE: 600; 310; 30; 20 INJECTION, SOLUTION INTRAVENOUS at 14:05

## 2019-06-11 RX ADMIN — ROPIVACAINE HYDROCHLORIDE 20 ML: 5 INJECTION, SOLUTION EPIDURAL; INFILTRATION; PERINEURAL at 11:48

## 2019-06-11 RX ADMIN — Medication 10 ML: at 20:29

## 2019-06-11 RX ADMIN — LIDOCAINE HYDROCHLORIDE 50 MG: 20 INJECTION, SOLUTION EPIDURAL; INFILTRATION; INTRACAUDAL at 13:01

## 2019-06-11 RX ADMIN — ACETAMINOPHEN 650 MG: 325 TABLET, FILM COATED ORAL at 07:06

## 2019-06-11 RX ADMIN — PHENYLEPHRINE HYDROCHLORIDE 100 MCG: 10 INJECTION INTRAVENOUS at 13:30

## 2019-06-11 ASSESSMENT — PULMONARY FUNCTION TESTS
PIF_VALUE: 17
PIF_VALUE: 20
PIF_VALUE: 18
PIF_VALUE: 20
PIF_VALUE: 18
PIF_VALUE: 20
PIF_VALUE: 19
PIF_VALUE: 18
PIF_VALUE: 18
PIF_VALUE: 17
PIF_VALUE: 18
PIF_VALUE: 2
PIF_VALUE: 3
PIF_VALUE: 3
PIF_VALUE: 18
PIF_VALUE: 3
PIF_VALUE: 17
PIF_VALUE: 18
PIF_VALUE: 20
PIF_VALUE: 20
PIF_VALUE: 12
PIF_VALUE: 19
PIF_VALUE: 15
PIF_VALUE: 18
PIF_VALUE: 18
PIF_VALUE: 15
PIF_VALUE: 18
PIF_VALUE: 20
PIF_VALUE: 15
PIF_VALUE: 17
PIF_VALUE: 20
PIF_VALUE: 18
PIF_VALUE: 17
PIF_VALUE: 0
PIF_VALUE: 1
PIF_VALUE: 19
PIF_VALUE: 20
PIF_VALUE: 20
PIF_VALUE: 15
PIF_VALUE: 17
PIF_VALUE: 19
PIF_VALUE: 21
PIF_VALUE: 20
PIF_VALUE: 18
PIF_VALUE: 1
PIF_VALUE: 18
PIF_VALUE: 15
PIF_VALUE: 20
PIF_VALUE: 8
PIF_VALUE: 20
PIF_VALUE: 12
PIF_VALUE: 17
PIF_VALUE: 18
PIF_VALUE: 20
PIF_VALUE: 18
PIF_VALUE: 20
PIF_VALUE: 15
PIF_VALUE: 19
PIF_VALUE: 18
PIF_VALUE: 17
PIF_VALUE: 20
PIF_VALUE: 18
PIF_VALUE: 18
PIF_VALUE: 19
PIF_VALUE: 18
PIF_VALUE: 15
PIF_VALUE: 15
PIF_VALUE: 18
PIF_VALUE: 18
PIF_VALUE: 15
PIF_VALUE: 24
PIF_VALUE: 12
PIF_VALUE: 20
PIF_VALUE: 19
PIF_VALUE: 20
PIF_VALUE: 17
PIF_VALUE: 18
PIF_VALUE: 15
PIF_VALUE: 17
PIF_VALUE: 15
PIF_VALUE: 4
PIF_VALUE: 18
PIF_VALUE: 18
PIF_VALUE: 12
PIF_VALUE: 18
PIF_VALUE: 19
PIF_VALUE: 20
PIF_VALUE: 18
PIF_VALUE: 18
PIF_VALUE: 6
PIF_VALUE: 17
PIF_VALUE: 0
PIF_VALUE: 21
PIF_VALUE: 0
PIF_VALUE: 20
PIF_VALUE: 20
PIF_VALUE: 2
PIF_VALUE: 1
PIF_VALUE: 18
PIF_VALUE: 18
PIF_VALUE: 20
PIF_VALUE: 12
PIF_VALUE: 18
PIF_VALUE: 15
PIF_VALUE: 18
PIF_VALUE: 18
PIF_VALUE: 7
PIF_VALUE: 20
PIF_VALUE: 18
PIF_VALUE: 3
PIF_VALUE: 17
PIF_VALUE: 15
PIF_VALUE: 21
PIF_VALUE: 6
PIF_VALUE: 0
PIF_VALUE: 20
PIF_VALUE: 21
PIF_VALUE: 18
PIF_VALUE: 19
PIF_VALUE: 18
PIF_VALUE: 0
PIF_VALUE: 20
PIF_VALUE: 21
PIF_VALUE: 20
PIF_VALUE: 18
PIF_VALUE: 15

## 2019-06-11 ASSESSMENT — PAIN SCALES - GENERAL
PAINLEVEL_OUTOF10: 0
PAINLEVEL_OUTOF10: 5
PAINLEVEL_OUTOF10: 0
PAINLEVEL_OUTOF10: 10
PAINLEVEL_OUTOF10: 2
PAINLEVEL_OUTOF10: 0

## 2019-06-11 ASSESSMENT — PAIN - FUNCTIONAL ASSESSMENT: PAIN_FUNCTIONAL_ASSESSMENT: 0-10

## 2019-06-11 NOTE — PROGRESS NOTES
Pt instructed in use of IS. PVU and demonstrated good effort and technique. Pt achieved volume of approx 750 cc/breath, with good insp breath hold.   IS left at pt's bedside, and pt encouraged to use as prescribed

## 2019-06-11 NOTE — PROGRESS NOTES
Writer updated son several times throughout the morning in regards to his extended wait to see his mother prior to surgery start and also the delay in surgery start time.

## 2019-06-11 NOTE — ANESTHESIA POSTPROCEDURE EVALUATION
Department of Anesthesiology  Postprocedure Note    Patient: Sasha Weiss  MRN: [id number]  YOB: 1945  Date of evaluation: 6/11/2019  Time:  4:31 PM     Procedure Summary     Date:  06/11/19 Room / Location:  [address] AT [facility] OR    Anesthesia Start:  1255 Anesthesia Stop:      Procedure:  SHOULDER TOTAL ARTHROPLASTY, OPEN PARTIAL CLAVICULECTOMY (Right ) Diagnosis:  (RIGHT SHOULDER OSTEOARTHRITIS)    Surgeon:  Kimble Cockayne, MD Responsible Provider:  ЕЛЕНА Pugh CRNA    Anesthesia Type:  general ASA Status:  3          Anesthesia Type: general    Carmen Phase I: Carmen Score: 10    Carmen Phase II:      Last vitals: Reviewed and per EMR flowsheets.        Anesthesia Post Evaluation    Patient location during evaluation: PACU  Patient participation: complete - patient participated  Level of consciousness: awake and alert  Pain score: 0  Airway patency: patent  Nausea & Vomiting: no nausea and no vomiting  Complications: no  Cardiovascular status: blood pressure returned to baseline  Respiratory status: acceptable  Hydration status: euvolemic

## 2019-06-11 NOTE — OP NOTE
DATE OF SURGERY: 6/11/2019    PREOPERATIVE DIAGNOSIS:   1. Right shoulder primary glenohumeral osteoarthritis   2. Right shoulder primary acromioclavicular osteoarthritis     POSTOPERATIVE DIAGNOSIS:  Same    SURGERY:  1. Right reverse total shoulder arthroplasty   2. Right shoulder open bone autograft to glenoid (BIO-RSA)  3. Right open partial claviculectomy  4. Right shoulder open biceps tenodesis    SURGEON: Windy Hurley M.D.    ASSISTANT:  Jacqueline Villeda    ANESTHESIA: General with interscalene block     EBL: 879 mL    COMPLICATIONS: None    IMPLANTS:   [employer] [place] Aequalis Ascend Flex size 2B stem with centered reversed tray and standard 6 mm poly insert for 36 mm glenosphere. Aequalis Reversed II threaded post standard 25 mm baseplate with 30 mm screw, 36 mm centered glenosphere. INDICATIONS:    Patient presented with chronic, progressive shoulder pain and weakness. Clinical exam and imaging revealed severe arthritis. Patient attempted nonsurgical measures, but was unable to obtain meaningful relief. Therefore, patient elected to proceed with surgical intervention. Informed consent was obtained following discussion of risks and benefits    DESCRIPTION OF PROCEDURE:  Patient was identified in preop holding area. With the patient's agreement, the surgical site was marked. The patient was taken to the operating room and placed supine on the operative table. Prophylactic IV antibiotics were administered. General anesthesia was induced. Patient was then positioned in the beachchair position with the cervical spine in neutral alignment. All bony processes were padded. The operative extremity was then prepped and draped in usual sterile fashion. Preoperative timeout taken to confirm the proper patient, surgical site, and procedure. We then began by creating incision over the anterior aspect of the shoulder. Sharp dissection was carried down through the skin and subcutaneous fat.  Hemostasis was secured with electrocautery. Cephalic vein was identified and the deltopectoral interval was then bluntly developed. We then released the coracoacromial acromial ligament and 2-3 mm of lateral conjoint tendon at the coracoid process. Also released 4-5 mm of the proximal pectoralis major tendon. We then dissected subcutaneously up to [facility] joint. The joint capsule was incised longitudinally and the distal clavicle was exposed. With retractors in place, 1 cm of the distal clavicle was excised. The AC capsule was then closed with 0 Vicryl suture. We then returned to the deltopectoral interval.  Biceps tendon was identified at the superior margin of the pectoralis major tendon. It was fixed to the pec tendon with #2 Ethibond suture. The biceps was then transected above the pec tendon. We then followed the biceps tendon proximally into the bicipital groove, releasing the transverse humeral ligament and rotator interval.      Subcapularis was then released from the lesser tuberosity, and inferior capsule was released from the humeral neck. The humeral head was then dislocated anteriorly. The drill guide was then applied to the proximal humerus and drill pin was placed in the center of the humeral head. The humeral head was then reamed down to subchondral bone and a 29 mm diameter core was reamed. The cutting guide was applied and a 10 mm thick core of bone graft was harvested. The humeral head cut was then completed in 30 degrees retroversion with soft tissue retractors in place. Canal finder was then used to open the intramedullary canal of the humerus, and we sequentially broached to the appropriate size achieving excellent press fit with good rotational control. Retractors were then placed around the glenoid. The labrum was circumferentially excised. The anterior and inferior capsule was released from the glenoid. The patient specific drill guide was then used to place the central drill pin. We then reamed over the pin to bleeding subchondral bone. The baseplate was then opened and the bone graft dowel was placed on the backside of the baseplate. The baseplate was inserted with central compression screw achieving excellent purchase. Drill guide was then used to drill peripheral screw holes and locking screws were placed. Wound was then irrigated with sterile saline impregnated with antibiotic. The taper was dried and the glenosphere component was then inserted. We then turned our attention back to the humerus. Humeral tray and polyethylene trials were inserted to achieve appropriate tension with good range of motion. The trials were then removed and the final humeral components were then inserted achieving excellent press-fit with good rotational control. The shoulder was again reduced and taken through range of motion confirming appropriate tension. The wound was again irrigated. Subcutaneous layer was closed with 2-0 Vicryl sutures placed in inverted interrupted fashion and the skin was closed with staples. Sterile dressing was applied. The operative drapes were removed. Patient was placed into a shoulder immobilizer, was then returned to the supine position, and was awoken from anesthesia without complications. Patient was removed from the operating room table and taken to the recovery room in stable condition.

## 2019-06-11 NOTE — CONSULTS
Inpatient consult to Primary Care Provider  Consult performed by: Jessie Linn MD  Consult ordered by: MD Sanam Cardenas M.D. Inpatient Consult Note 19    Patient:  Nasra Trevino  MRN: [id number]    Reason for Consultation:  Post-operative medical management    Requesting Physician: Dr. Lillie Bamberger    History Obtained From:  patient, electronic medical record  PCP: Akilah Lawson MD    History of Present Illness: The patient is a 66 y.o. female who underwent an elective right reverse shoulder arthroplasty  by    for degenerative arthritis and pain which was uncontrolled with outpatient conservative management. Post-op course thus far has been uncomplicated. Her pain is controlled. Past Medical History:        Diagnosis Date    Arthritis     osteo    Edema     Hyperlipidemia     Hypertension     Hypothyroidism     Kidney stones     Osteoarthritis     Thyroid disease     Unspecified transient cerebral ischemia        Past Surgical History:        Procedure Laterality Date    APPENDECTOMY      CATARACT REMOVAL       SECTION      x4    CORNEAL TRANSPLANT      bilat    FINGER SURGERY Left 2018    INDEX FINGER    HAMMER TOE SURGERY      right and left    JOINT REPLACEMENT Left 10/15/14    TKA    KNEE ARTHROSCOPY Left     LITHOTRIPSY      SHOULDER ARTHROPLASTY Right 2019    Dr. Mary Barksdale         Medications Prior to Admission:    Prior to Admission medications    Medication Sig Start Date End Date Taking? Authorizing Provider   HYDROcodone-acetaminophen (NORCO) 5-325 MG per tablet Take 1 tablet by mouth every 4 hours as needed for Pain.    Yes Historical Provider, MD   hydrochlorothiazide (HYDRODIURIL) 25 MG tablet Take 1 tablet by mouth every other day 19  Yes Jessie Linn MD   lisinopril (PRINIVIL;ZESTRIL) 10 MG tablet Take 1 tablet by mouth daily 19  Yes Jessie Linn MD   traMADol (ULTRAM) 50 MG tablet Take 50 mg by mouth every 6 hours as needed for Pain. Yes Historical Provider, MD   acetaminophen (TYLENOL) 500 MG tablet Take 500 mg by mouth every 6 hours as needed for Pain   Yes Historical Provider, MD   levothyroxine (SYNTHROID) 100 MCG tablet Take 1 tablet by mouth Daily 4/23/19  Yes Christos Esquivel MD   Calcium Carbonate (CALCIUM 600 PO) Take by mouth daily    Historical Provider, MD   Multiple Vitamins-Minerals (PRESERVISION AREDS 2) CAPS Take by mouth daily    Historical Provider, MD   hydroxychloroquine (PLAQUENIL) 200 MG tablet Take by mouth daily    Historical Provider, MD   meloxicam (MOBIC) 7.5 MG tablet Take 1 tablet by mouth daily 3/22/19   Christos Esquviel MD   aspirin 81 MG chewable tablet Take 81 mg by mouth daily. Historical Provider, MD   Cholecalciferol (VITAMIN D3) 2000 UNITS CAPS Take 1,000 Int'l Units/day by mouth. Historical Provider, MD       Allergies:  Patient has no known allergies. Social History:   TOBACCO:   reports that she has never smoked. She has never used smokeless tobacco.  ETOH:   reports that she does not drink alcohol.     Family History:       Problem Relation Age of Onset    Diabetes Mother     High Blood Pressure Mother     Diabetes Father     Cancer Father         lung    High Blood Pressure Father     Diabetes Sister     Heart Disease Sister        Review of Systems:  Constitutional: negative for chills and fevers  ENT: negative for nasal congestion, sore throat and voice change  Respiratory: negative for shortness of breath or cough  Cardiovascular: negative for chest pain and palpitations  Gastrointestinal: negative for abdominal pain, nausea, vomiting, diarrhea or constipation  Genitourinary:negative for dysuria, urgency or frequency  Musculoskeletal: positive for joint pain  Skin: negative for rashes or skin lesions  Neurological: negative for weakness, numbness or tingling    Objective:    Vitals:   Vitals:    06/11/19 1630   BP: 115/65   Pulse: 72   Resp: 16   Temp:    SpO2: 96%     Weight: 160 lb (72.6 kg)   Height: 4' 7\" (139.7 cm)   -----------------------------------------------------------------  Exam:  GEN:   Awake, A+O x 3, no acute distress  Ears:  canals and TMs NI  Nose/Sinuses:  negative, Nares normal. Septum midline. Mucosa normal. No drainage or sinus tenderness. Mouth/Throat:  Mucosa moist.  No lesions. Pharynx without erythema, edema or exudate. NECK:  supple, no carotid bruit  PULM:  appears well, vitals normal, no respiratory distress, acyanotic, normal RR, ear and throat exam is normal, neck free of mass or lymphadenopathy, chest clear, no wheezing, crepitations, rhonchi, normal symmetric air entry  COR:  normal rate, normal S1 and S2, no gallops, intact distal pulses and no carotid bruits  ABD:   Positive BS, soft, non-distended, non-tender  EXT:    Pedal pulses are intact. No calf tenderness  NEURO: Motor and sensory grossly intact  SKIN:   Incision appears well approximated  Rt shoulder in sling  -----------------------------------------------------------------  Diagnostic Data:   · Reviewed    Assessment:      Status post 1. Right reverse total shoulder arthroplasty   2. Right shoulder open bone autograft to glenoid (BIO-RSA)  3. Right open partial claviculectomy  4.  Right shoulder open biceps tenodesis    ·   · HTN  · Hypothyroidims    Recommendation:     · Agree with post-op orders as written by Dr. Saul Rubio  · Agree with current post-op DVT prophylaxis orders  · Pain control as ordered per Dr. Saul Rubio  · Monitor bp  · Thank you for consultation and allowing to participate in the care of patient  · Will follow    Amauri Maldonado MD

## 2019-06-11 NOTE — PROGRESS NOTES
Physical Therapy    Facility/Department: [facility] MED SURG  Initial Assessment    NAME: Lisandra Magaña  : 1945  MRN: [id number]    Date of Service: 2019    Discharge Recommendations:  Continue to assess pending progress, Patient would benefit from continued therapy after discharge        Assessment   Assessment: Pt is a right-handed, 67 y/o female admitted for elective R reverse total shoulder arthroplasty. Pt currently requires CGA with bed mobility and transfers. Pt ambulates 5ft with quad cane and CGA. Pt has shoulder immobilizer donned throughout duration of evaluation. LE strength grossly 4/5. Pt will benefit from skilled PT services during hospital stay to improve strength, gait and functional mobility for safe return home. Treatment Diagnosis: generalized weakness  Prognosis: Good  Decision Making: Low Complexity  Patient Education: Purpose of PT eval and POC. REQUIRES PT FOLLOW UP: Yes  Activity Tolerance  Activity Tolerance: Patient Tolerated treatment well       Patient Diagnosis(es): There were no encounter diagnoses. has a past medical history of Arthritis, Edema, Hyperlipidemia, Hypertension, Hypothyroidism, Kidney stones, Osteoarthritis, Thyroid disease, and Unspecified transient cerebral ischemia. has a past surgical history that includes Lithotripsy; Knee arthroscopy (Left);  section; Tonsillectomy; Appendectomy; Hammer toe surgery; Cataract removal; Corneal transplant; joint replacement (Left, 10/15/14); Finger surgery (Left, 2018); and Shoulder Arthroplasty (Right, 2019).     Restrictions  Restrictions/Precautions  Restrictions/Precautions: General Precautions, Fall Risk, ROM Restrictions  Required Braces or Orthoses?: Yes  Required Braces or Orthoses  Right Upper Extremity Brace/Splint: (Immobilizer)  Vision/Hearing  Vision: Impaired  Vision Exceptions: Wears glasses at all times  Hearing: Within functional limits     Subjective  General  Patient assessed for in place    Goals  Short term goals  Time Frame for Short term goals: 10 days  Short term goal 1: Pt will be independent with bed mobility and transfers for improved functional independence. Short term goal 2: Pt will ambulate 150ft with Hong and no evidence of LOB or unsteadiness for safety with household and community ambulation. Short term goal 3: Pt will tolerate 20-30mins ther ex/act to improve endurance for ADLs and functional tasks.         Therapy Time   Individual Concurrent Group Co-treatment   Time In 1650         Time Out 1710         Minutes 20                 Iliana Bethea, MARYA

## 2019-06-11 NOTE — ANESTHESIA PROCEDURE NOTES
Peripheral Block    Patient location during procedure: pre-op  Start time: 6/11/2019 11:42 AM  End time: 6/11/2019 11:48 AM  Staffing  Resident/CRNA: ЕЛЕНА Guerrero CRNA  Performed: resident/CRNA   Preanesthetic Checklist  Completed: patient identified, site marked, surgical consent, pre-op evaluation, timeout performed, IV checked, risks and benefits discussed, monitors and equipment checked, anesthesia consent given, oxygen available and patient being monitored  Peripheral Block  Patient position: sitting (low semi fowlers)  Prep: ChloraPrep  Patient monitoring: continuous pulse ox, frequent blood pressure checks and IV access  Block type: Brachial plexus  Laterality: right  Injection technique: single-shot  Procedures: ultrasound guided  Local infiltration: ropivacaine and decadron  Infiltration strength: 0.5 %  Dose: 20 mL  Interscalene  Provider prep: mask and sterile gloves  Local infiltration: ropivacaine and decadron  Needle  Needle type: combined needle/nerve stimulator   Needle gauge: 22 G  Needle length: 5 cm  Needle localization: ultrasound guidance  Assessment  Injection assessment: negative aspiration for heme, no paresthesia on injection and local visualized surrounding nerve on ultrasound  Paresthesia pain: none  Slow fractionated injection: yes  Hemodynamics: stable  Reason for block: post-op pain management and at surgeon's request

## 2019-06-11 NOTE — ANESTHESIA PRE PROCEDURE
Department of Anesthesiology  Preprocedure Note       Name:  Sasha Weiss   Age:  66 y.o.  :  1945                                          MRN:  [id number]         Date:  2019      Surgeon: Simon Ivan):  Kimble Cockayne, MD    Procedure: SHOULDER TOTAL ARTHROPLASTY, OPEN PARTIAL CLAVICULECTOMY (Right )    Medications prior to admission:   Prior to Admission medications    Medication Sig Start Date End Date Taking? Authorizing Provider   HYDROcodone-acetaminophen (NORCO) 5-325 MG per tablet Take 1 tablet by mouth every 4 hours as needed for Pain. Yes Historical Provider, MD   hydrochlorothiazide (HYDRODIURIL) 25 MG tablet Take 1 tablet by mouth every other day 19  Yes Jarrett Joseph MD   lisinopril (PRINIVIL;ZESTRIL) 10 MG tablet Take 1 tablet by mouth daily 19  Yes Jarrett Joseph MD   traMADol (ULTRAM) 50 MG tablet Take 50 mg by mouth every 6 hours as needed for Pain. Yes Historical Provider, MD   acetaminophen (TYLENOL) 500 MG tablet Take 500 mg by mouth every 6 hours as needed for Pain   Yes Historical Provider, MD   levothyroxine (SYNTHROID) 100 MCG tablet Take 1 tablet by mouth Daily 19  Yes Jarrett Joseph MD   Calcium Carbonate (CALCIUM 600 PO) Take by mouth daily    Historical Provider, MD   Multiple Vitamins-Minerals (PRESERVISION AREDS 2) CAPS Take by mouth daily    Historical Provider, MD   hydroxychloroquine (PLAQUENIL) 200 MG tablet Take by mouth daily    Historical Provider, MD   meloxicam (MOBIC) 7.5 MG tablet Take 1 tablet by mouth daily 3/22/19   Jarrett Joseph MD   aspirin 81 MG chewable tablet Take 81 mg by mouth daily. Historical Provider, MD   Cholecalciferol (VITAMIN D3) 2000 UNITS CAPS Take 1,000 Int'l Units/day by mouth.     Historical Provider, MD       Current medications:    Current Facility-Administered Medications   Medication Dose Route Frequency Provider Last Rate Last Dose    ceFAZolin (ANCEF) 2 g in dextrose 5 % 100 mL IVPB  2 g Intravenous Once Britni Oviedo MD        lactated ringers infusion   Intravenous Continuous Britni Oviedo MD           Allergies:  No Known Allergies    Problem List:    Patient Active Problem List   Diagnosis Code    Transient cerebral ischemia G45.9    Essential hypertension I10    Hyperlipidemia E78.5    Primary osteoarthritis involving multiple joints M15.0    Hypothyroidism E03.9    Primary osteoarthritis of right shoulder M19.011       Past Medical History:        Diagnosis Date    Arthritis     osteo    Edema     Hyperlipidemia     Hypertension     Hypothyroidism     Kidney stones     Osteoarthritis     Thyroid disease     Unspecified transient cerebral ischemia        Past Surgical History:        Procedure Laterality Date    APPENDECTOMY      CATARACT REMOVAL       SECTION      x4    CORNEAL TRANSPLANT      bilat    FINGER SURGERY Left 2018    INDEX FINGER    HAMMER TOE SURGERY      right and left    JOINT REPLACEMENT Left 10/15/14    TKA    KNEE ARTHROSCOPY Left     LITHOTRIPSY      TONSILLECTOMY         Social History:    Social History     Tobacco Use    Smoking status: Never Smoker    Smokeless tobacco: Never Used   Substance Use Topics    Alcohol use:  No                                Counseling given: Not Answered      Vital Signs (Current):   Vitals:    19 0652   BP: 136/79   Pulse: 66   Resp: 18   Temp: 36.6 °C (97.8 °F)   TempSrc: Temporal   SpO2: 100%   Weight: 160 lb (72.6 kg)   Height: 4' 7\" (1.397 m)                                              BP Readings from Last 3 Encounters:   19 136/79   19 131/76   19 123/68       NPO Status: Time of last liquid consumption: 2300(  - 430 SIP WITH MEDS)                        Time of last solid consumption: 2200                        Date of last liquid consumption: 06/10/19                        Date of last solid food consumption: 06/10/19    BMI:   Wt Readings from Last 3 Encounters:   19 160 lb (72.6 kg)   05/24/19 157 lb 9.6 oz (71.5 kg)   05/08/19 160 lb 4.8 oz (72.7 kg)     Body mass index is 37.19 kg/m². CBC:   Lab Results   Component Value Date    WBC 8.6 05/08/2019    RBC 3.62 05/08/2019    HGB 12.0 05/08/2019    HCT 38.5 05/08/2019    .4 05/08/2019    RDW 14.3 05/08/2019     05/08/2019       CMP:   Lab Results   Component Value Date     05/08/2019    K 4.4 05/08/2019     05/08/2019    CO2 27 05/08/2019    BUN 30 05/08/2019    CREATININE 0.67 05/08/2019    GFRAA >60 05/08/2019    LABGLOM >60 05/08/2019    GLUCOSE 99 05/08/2019    PROT 6.8 07/14/2016    CALCIUM 9.1 05/08/2019    BILITOT 0.45 07/14/2016    ALKPHOS 58 07/14/2016    AST 20 07/14/2016    ALT 16 07/14/2016       POC Tests: No results for input(s): POCGLU, POCNA, POCK, POCCL, POCBUN, POCHEMO, POCHCT in the last 72 hours. Coags: No results found for: PROTIME, INR, APTT    HCG (If Applicable): No results found for: PREGTESTUR, PREGSERUM, HCG, HCGQUANT     ABGs: No results found for: PHART, PO2ART, GXI8LCH, BVY8YWT, BEART, U8CQHBWP     Type & Screen (If Applicable):  No results found for: [facility]    Anesthesia Evaluation  Patient summary reviewed and Nursing notes reviewed no history of anesthetic complications:   Airway: Mallampati: II  TM distance: >3 FB   Neck ROM: full  Mouth opening: > = 3 FB Dental:          Pulmonary:Negative Pulmonary ROS and normal exam                               Cardiovascular:  Exercise tolerance: poor (<4 METS), Patient does not do any yard work or walk up the stairs  (+) hypertension:, valvular problems/murmurs: AI and MR, hyperlipidemia      ECG reviewed          Cleared by cardiology           ROS comment: Summary  Global left ventricular systolic function appears preserved with an  estimated ejection fraction of >65%. The left ventricular cavity size is within normal limits and the left  ventricular wall thickness is mildly increased.   The patient has a sigmoid interventricular septum without evidence of  outflow tract obstruction. Aortic leaflets show calcification without restriction of motion. Mild  aortic insufficiency. Normal mitral valve structure with mild to moderate mitral regurgitation. Normal tricuspid valve structure with mild tricuspid regurgitation. Evidence of mild (grade I) diastolic dysfunction is seen. Neuro/Psych:      (-) TIA (patient denies)           GI/Hepatic/Renal:        (-) GERD       Endo/Other:    (+) hypothyroidism::., .                 Abdominal:   (+) obese,         Vascular: negative vascular ROS. Anesthesia Plan      general     ASA 3     (Agreed to regional blockk)  Induction: intravenous. MIPS: Prophylactic antiemetics administered. Anesthetic plan and risks discussed with patient.                       ЕЛЕНА Yeung - CRNA   6/11/2019

## 2019-06-11 NOTE — PROGRESS NOTES
Discussed discharge plans with the patient and son. Patient is a 66year old female here with 1. Right shoulder primary glenohumeral osteoarthritis and 2. Right shoulder primary acromioclavicular osteoarthritis . She is alert and oriented. Patient is a  and her son lives with her. She has a walker and cane if needed. Patient normally does her own cooking and cleaning. She manages her own medication and drives. Her PCP is Dr. Anne France. She has medical insurance that helps with medication costs. The discharge plan is home with the son's help and going to out-patient PT at [facility]. This was set up prior to surgery. Her PT appointment is for 6/13 @ 1 PM. She does not have advance directives and is not interested at this time. JOSELYNW to monitor and assist with any needs or concerns as they arise.     BILLY Casarez

## 2019-06-12 VITALS
BODY MASS INDEX: 37.58 KG/M2 | TEMPERATURE: 97.9 F | SYSTOLIC BLOOD PRESSURE: 97 MMHG | HEART RATE: 58 BPM | OXYGEN SATURATION: 97 % | HEIGHT: 55 IN | DIASTOLIC BLOOD PRESSURE: 54 MMHG | RESPIRATION RATE: 16 BRPM | WEIGHT: 162.4 LBS

## 2019-06-12 LAB
ANION GAP SERPL CALCULATED.3IONS-SCNC: 9 MMOL/L (ref 9–17)
BUN BLDV-MCNC: 19 MG/DL (ref 8–23)
BUN/CREAT BLD: 31 (ref 9–20)
CALCIUM SERPL-MCNC: 8.8 MG/DL (ref 8.6–10.4)
CHLORIDE BLD-SCNC: 105 MMOL/L (ref 98–107)
CO2: 26 MMOL/L (ref 20–31)
CREAT SERPL-MCNC: 0.62 MG/DL (ref 0.5–0.9)
GFR AFRICAN AMERICAN: >60 ML/MIN
GFR NON-AFRICAN AMERICAN: >60 ML/MIN
GFR SERPL CREATININE-BSD FRML MDRD: ABNORMAL ML/MIN/{1.73_M2}
GFR SERPL CREATININE-BSD FRML MDRD: ABNORMAL ML/MIN/{1.73_M2}
GLUCOSE BLD-MCNC: 128 MG/DL (ref 70–99)
HCT VFR BLD CALC: 31.8 % (ref 36.3–47.1)
HEMOGLOBIN: 10.1 G/DL (ref 11.9–15.1)
POTASSIUM SERPL-SCNC: 4 MMOL/L (ref 3.7–5.3)
SODIUM BLD-SCNC: 140 MMOL/L (ref 135–144)

## 2019-06-12 PROCEDURE — 85014 HEMATOCRIT: CPT

## 2019-06-12 PROCEDURE — 85018 HEMOGLOBIN: CPT

## 2019-06-12 PROCEDURE — 2580000003 HC RX 258: Performed by: ORTHOPAEDIC SURGERY

## 2019-06-12 PROCEDURE — 36415 COLL VENOUS BLD VENIPUNCTURE: CPT

## 2019-06-12 PROCEDURE — 97166 OT EVAL MOD COMPLEX 45 MIN: CPT

## 2019-06-12 PROCEDURE — 80048 BASIC METABOLIC PNL TOTAL CA: CPT

## 2019-06-12 PROCEDURE — 97530 THERAPEUTIC ACTIVITIES: CPT

## 2019-06-12 PROCEDURE — 6370000000 HC RX 637 (ALT 250 FOR IP): Performed by: ORTHOPAEDIC SURGERY

## 2019-06-12 PROCEDURE — 6360000002 HC RX W HCPCS: Performed by: ORTHOPAEDIC SURGERY

## 2019-06-12 RX ORDER — HYDROCODONE BITARTRATE AND ACETAMINOPHEN 5; 325 MG/1; MG/1
1 TABLET ORAL EVERY 4 HOURS PRN
Qty: 42 TABLET | Refills: 0 | Status: SHIPPED | OUTPATIENT
Start: 2019-06-12 | End: 2019-06-19

## 2019-06-12 RX ORDER — CEFAZOLIN SODIUM 1 G/3ML
INJECTION, POWDER, FOR SOLUTION INTRAMUSCULAR; INTRAVENOUS
Status: DISCONTINUED
Start: 2019-06-12 | End: 2019-06-12 | Stop reason: HOSPADM

## 2019-06-12 RX ORDER — ASPIRIN 325 MG
325 TABLET, DELAYED RELEASE (ENTERIC COATED) ORAL DAILY
Qty: 14 TABLET | Refills: 0 | Status: SHIPPED | OUTPATIENT
Start: 2019-06-12 | End: 2021-06-24

## 2019-06-12 RX ADMIN — Medication 2 G: at 02:30

## 2019-06-12 RX ADMIN — DOCUSATE SODIUM 100 MG: 100 CAPSULE, LIQUID FILLED ORAL at 07:57

## 2019-06-12 RX ADMIN — ACETAMINOPHEN 650 MG: 325 TABLET, FILM COATED ORAL at 04:40

## 2019-06-12 RX ADMIN — MELOXICAM 7.5 MG: 7.5 TABLET ORAL at 07:57

## 2019-06-12 RX ADMIN — LISINOPRIL 10 MG: 10 TABLET ORAL at 07:57

## 2019-06-12 RX ADMIN — LEVOTHYROXINE SODIUM 100 MCG: 100 TABLET ORAL at 07:57

## 2019-06-12 RX ADMIN — SODIUM CHLORIDE: 9 INJECTION, SOLUTION INTRAVENOUS at 02:27

## 2019-06-12 RX ADMIN — ASPIRIN 325 MG: 325 TABLET, DELAYED RELEASE ORAL at 07:58

## 2019-06-12 ASSESSMENT — PAIN SCALES - GENERAL
PAINLEVEL_OUTOF10: 5
PAINLEVEL_OUTOF10: 4
PAINLEVEL_OUTOF10: 4
PAINLEVEL_OUTOF10: 5

## 2019-06-12 NOTE — PROGRESS NOTES
Independent  Homemaking Assistance: Independent  Homemaking Responsibilities: Yes  Ambulation Assistance: Independent  Transfer Assistance: Independent  Active : Yes  Occupation: Full time employment( at [employer])       Objective   Vision: Impaired  Vision Exceptions: Wears glasses at all times  Hearing: Within functional limits    Orientation  Overall Orientation Status: Within Functional Limits  Observation/Palpation  Observation: Pt has shoulder immobilizer/ICE Man donned at time of eval; Pt. is R handed. ADL  Feeding: Independent  Grooming: Stand by assistance  UE Bathing: Minimal assistance  LE Bathing: Minimal assistance  UE Dressing: Moderate assistance  LE Dressing: Moderate assistance  Toileting: Minimal assistance        Transfers  Sit to stand: Contact guard assistance  Stand to sit: Contact guard assistance     Cognition  Overall Cognitive Status: WFL        Left Hand PROM (degrees)  Left Hand General PROM: reports hx L hand surgery and arthritis, unable to achieve full extension of fingers  RUE AROM (degrees)  RUE General AROM: shoulder immobilizer        Plan   Plan  Times per week: 7x/wk  Times per day: Daily  Current Treatment Recommendations: Strengthening, ROM, Functional Mobility Training, Safety Education & Training, Self-Care / ADL       Goals  Short term goals  Time Frame for Short term goals: 10 days  Short term goal 1: Patient will complete simple bathing/dressing/grooming tasks using one handed/adaptive techs with SBA to increase (I) with ADL's. Short term goal 2: Patient will complete toileting tasks with SUP with reduced risk for falls. Short term goal 3: Pt. will V/D G knowledge of rTSA precautions, one handed techs, AE/DME, safe transfers and fall prevention for safe return home.        Therapy Time   Individual Concurrent Group Co-treatment   Time In [facility]         Time Out 0853         Minutes [place], [place]

## 2019-06-12 NOTE — DISCHARGE SUMMARY
ADMISSION DIAGNOSIS:  Right shoulder primary osteoarthritis. PROCEDURES WHILE INPATIENT:  Right reverse total shoulder arthroplasty, right shoulder open bone autograft to glenoid (BIO-RSA), right open partial claviculectomy and right shoulder open biceps tenodesis performed on 6/11/2019. HOSPITAL COURSE:  The patient presented to the hospital on day of surgery. Surgery was completed without complications. Following surgery, the patient was admitted to regular nursing floor for postoperative pain control and physical therapy. The patient's was controlled with oral medications. Patient had pharmacologic and mechanical DVT prophylaxis. The patient began working with physical therapy and occupational therapy. Case management was consulted for assistance with discharge planning. The patient was deemed safe for discharge and was subsequently discharged following an uncomplicated postoperative course. DISPOSITION:  Home     CONDITION UPON DISCHARGE:  Stable. DISCHARGE MEDICATIONS:  The patient will resume home pre-hospital medication regimen. New medications include:   Norco 5/325 mg 1 tablets every 4 hours as needed for pain  Aspirin 325 mg daily. INSTRUCTIONS:  Keep sling in place except for hygiene. Keep dressing in place and incision dry. FOLLOW UP:  Follow up with Dr. Saul Rubio in 2 weeks. Follow up with outpatient physical therapy in 2-3 days as scheduled. Follow up with primary care physician within 1 month, or sooner as needed.

## 2019-06-12 NOTE — PROGRESS NOTES
Department of Orthopedic Surgery  Progress Note    Subjective:  No complaints. Doing well postoperatively. Overall, pain has been well controlled. Pain is perceived as moderate (4-6 pain scale). Has only been taking Tylenol. Pain is better with rest, immobilization and Tylenol. Pain worse with movement. Denies any numbness/tingling. No calf tenderness noted. Vitals  VITALS:  BP (!) 97/54   Pulse 58   Temp 97.9 °F (36.6 °C) (Temporal)   Resp 16   Ht 4' 7\" (1.397 m)   Wt 162 lb 6.4 oz (73.7 kg)   SpO2 97%   BMI 37.75 kg/m²     PHYSICAL EXAM:  General: in no apparent distress, well developed and well nourished, alert and oriented times 3  Right Upper Extremity  Incision:  Aquacel dressing in place, clean, dry and intact. Mild ecchymosis noted surrounding dressing. No erythema or abnormal warmth. Neurologic:  Moving upper extremity as appropriate following sugery. Motor function intact. Sensation grossly intact to light touch. Vascular: present 2+ radial pulse    Abnormal Exam findings:  none    LABS:  Hgb:    Lab Results   Component Value Date    HGB 12.0 05/08/2019       ASSESSMENT AND PLAN:  Post operative day 1 status post right reverse total shoulder arthroplasty, right shoulder open bone autograft to glenoid (BIO-RSA), right open partial claviculectomy and right shoulder open biceps tenodesis       1:  Continue sling  2:  Continue Deep venous thrombosis prophylaxis; will start on Aspirin 325 mg once daily  3:  Physical therapy. Has outpatient therapy scheduled for 6/13/19  4:  D/C Plan:  Home pending Hgb/Hct.   Patient lives at home with her son  5:  Continue Pain Control  6:  Keep 2 week f/u appt with Dr. Sara Cannon

## 2019-06-12 NOTE — PLAN OF CARE
Problem: Falls - Risk of:  Goal: Will remain free from falls  Description  Will remain free from falls  Outcome: Ongoing  Note:   Pt bed in lowest position with wheels locked. Call light within reach. Bed alarm in place. Room remains free of obstacles. Pt reminded to use call light as needed, verbalizes understanding. Will continue to monitor. Problem: Pain:  Goal: Control of acute pain  Description  Control of acute pain  Outcome: Ongoing  Note:   Pain assessments provided with interventions as appropriate.

## 2019-09-26 ENCOUNTER — HOSPITAL ENCOUNTER (OUTPATIENT)
Age: 74
Discharge: HOME | End: 2019-09-26
Payer: COMMERCIAL

## 2019-09-26 DIAGNOSIS — E03.9 HYPOTHYROIDISM, UNSPECIFIED TYPE: ICD-10-CM

## 2019-09-26 LAB — TSH SERPL DL<=0.05 MIU/L-ACNC: 0.19 MIU/L (ref 0.3–5)

## 2019-09-26 PROCEDURE — 84443 ASSAY THYROID STIM HORMONE: CPT

## 2019-09-26 PROCEDURE — 36415 COLL VENOUS BLD VENIPUNCTURE: CPT

## 2019-10-23 ENCOUNTER — HOSPITAL ENCOUNTER (OUTPATIENT)
Age: 74
Discharge: HOME | End: 2019-10-23
Payer: COMMERCIAL

## 2019-10-23 DIAGNOSIS — E03.9 HYPOTHYROIDISM, UNSPECIFIED TYPE: ICD-10-CM

## 2019-10-23 LAB — TSH SERPL DL<=0.05 MIU/L-ACNC: 0.64 MIU/L (ref 0.3–5)

## 2019-10-23 PROCEDURE — 84443 ASSAY THYROID STIM HORMONE: CPT

## 2019-10-23 PROCEDURE — 36415 COLL VENOUS BLD VENIPUNCTURE: CPT

## 2019-12-30 ENCOUNTER — HOSPITAL ENCOUNTER (OUTPATIENT)
Age: 74
Discharge: HOME | End: 2019-12-30
Payer: COMMERCIAL

## 2019-12-30 DIAGNOSIS — I10 ESSENTIAL HYPERTENSION: ICD-10-CM

## 2019-12-30 LAB
ANION GAP SERPL CALCULATED.3IONS-SCNC: 11 MMOL/L (ref 9–17)
BUN BLDV-MCNC: 38 MG/DL (ref 8–23)
BUN/CREAT BLD: 57 (ref 9–20)
CALCIUM SERPL-MCNC: 9.7 MG/DL (ref 8.6–10.4)
CHLORIDE BLD-SCNC: 101 MMOL/L (ref 98–107)
CO2: 26 MMOL/L (ref 20–31)
CREAT SERPL-MCNC: 0.67 MG/DL (ref 0.5–0.9)
GFR AFRICAN AMERICAN: >60 ML/MIN
GFR NON-AFRICAN AMERICAN: >60 ML/MIN
GFR SERPL CREATININE-BSD FRML MDRD: ABNORMAL ML/MIN/{1.73_M2}
GFR SERPL CREATININE-BSD FRML MDRD: ABNORMAL ML/MIN/{1.73_M2}
GLUCOSE BLD-MCNC: 90 MG/DL (ref 70–99)
POTASSIUM SERPL-SCNC: 3.8 MMOL/L (ref 3.7–5.3)
SODIUM BLD-SCNC: 138 MMOL/L (ref 135–144)

## 2019-12-30 PROCEDURE — 36415 COLL VENOUS BLD VENIPUNCTURE: CPT

## 2019-12-30 PROCEDURE — 80048 BASIC METABOLIC PNL TOTAL CA: CPT

## 2020-08-27 PROBLEM — E66.01 MORBIDLY OBESE (HCC): Status: ACTIVE | Noted: 2020-08-27

## 2020-09-02 ENCOUNTER — HOSPITAL ENCOUNTER (OUTPATIENT)
Age: 75
Discharge: HOME | End: 2020-09-02
Payer: COMMERCIAL

## 2020-09-02 LAB
ABSOLUTE EOS #: 0.19 K/UL (ref 0–0.44)
ABSOLUTE IMMATURE GRANULOCYTE: <0.03 K/UL (ref 0–0.3)
ABSOLUTE LYMPH #: 1.3 K/UL (ref 1.1–3.7)
ABSOLUTE MONO #: 0.65 K/UL (ref 0.1–1.2)
ALBUMIN SERPL-MCNC: 4 G/DL (ref 3.5–5.2)
ALBUMIN/GLOBULIN RATIO: 1.6 (ref 1–2.5)
ALP BLD-CCNC: 75 U/L (ref 35–104)
ALT SERPL-CCNC: 20 U/L (ref 5–33)
ANION GAP SERPL CALCULATED.3IONS-SCNC: 9 MMOL/L (ref 9–17)
AST SERPL-CCNC: 21 U/L
BASOPHILS # BLD: 0 % (ref 0–2)
BASOPHILS ABSOLUTE: <0.03 K/UL (ref 0–0.2)
BILIRUB SERPL-MCNC: 0.55 MG/DL (ref 0.3–1.2)
BUN BLDV-MCNC: 35 MG/DL (ref 8–23)
BUN/CREAT BLD: 38 (ref 9–20)
CALCIUM SERPL-MCNC: 9.5 MG/DL (ref 8.6–10.4)
CHLORIDE BLD-SCNC: 106 MMOL/L (ref 98–107)
CHOLESTEROL/HDL RATIO: 2.8
CHOLESTEROL: 148 MG/DL
CO2: 25 MMOL/L (ref 20–31)
CREAT SERPL-MCNC: 0.91 MG/DL (ref 0.5–0.9)
DIFFERENTIAL TYPE: ABNORMAL
EOSINOPHILS RELATIVE PERCENT: 3 % (ref 1–4)
GFR AFRICAN AMERICAN: >60 ML/MIN
GFR NON-AFRICAN AMERICAN: 60 ML/MIN
GFR SERPL CREATININE-BSD FRML MDRD: ABNORMAL ML/MIN/{1.73_M2}
GFR SERPL CREATININE-BSD FRML MDRD: ABNORMAL ML/MIN/{1.73_M2}
GLUCOSE BLD-MCNC: 98 MG/DL (ref 70–99)
HCT VFR BLD CALC: 36.5 % (ref 36.3–47.1)
HDLC SERPL-MCNC: 52 MG/DL
HEMOGLOBIN: 11.3 G/DL (ref 11.9–15.1)
IMMATURE GRANULOCYTES: 0 %
LDL CHOLESTEROL: 80 MG/DL (ref 0–130)
LYMPHOCYTES # BLD: 20 % (ref 24–43)
MCH RBC QN AUTO: 32.2 PG (ref 25.2–33.5)
MCHC RBC AUTO-ENTMCNC: 31 G/DL (ref 28.4–34.8)
MCV RBC AUTO: 104 FL (ref 82.6–102.9)
MONOCYTES # BLD: 10 % (ref 3–12)
NRBC AUTOMATED: 0 PER 100 WBC
PDW BLD-RTO: 13.6 % (ref 11.8–14.4)
PLATELET # BLD: 200 K/UL (ref 138–453)
PLATELET ESTIMATE: ABNORMAL
PMV BLD AUTO: 10.6 FL (ref 8.1–13.5)
POTASSIUM SERPL-SCNC: 4.1 MMOL/L (ref 3.7–5.3)
RBC # BLD: 3.51 M/UL (ref 3.95–5.11)
RBC # BLD: ABNORMAL 10*6/UL
SEG NEUTROPHILS: 67 % (ref 36–65)
SEGMENTED NEUTROPHILS ABSOLUTE COUNT: 4.44 K/UL (ref 1.5–8.1)
SODIUM BLD-SCNC: 140 MMOL/L (ref 135–144)
TOTAL PROTEIN: 6.5 G/DL (ref 6.4–8.3)
TRIGL SERPL-MCNC: 82 MG/DL
TSH SERPL DL<=0.05 MIU/L-ACNC: 1.01 MIU/L (ref 0.3–5)
VLDLC SERPL CALC-MCNC: NORMAL MG/DL (ref 1–30)
WBC # BLD: 6.6 K/UL (ref 3.5–11.3)
WBC # BLD: ABNORMAL 10*3/UL

## 2020-09-02 PROCEDURE — 80061 LIPID PANEL: CPT

## 2020-09-02 PROCEDURE — 36415 COLL VENOUS BLD VENIPUNCTURE: CPT

## 2020-09-02 PROCEDURE — 85025 COMPLETE CBC W/AUTO DIFF WBC: CPT

## 2020-09-02 PROCEDURE — 80053 COMPREHEN METABOLIC PANEL: CPT

## 2020-09-02 PROCEDURE — 84443 ASSAY THYROID STIM HORMONE: CPT

## 2020-10-08 ENCOUNTER — HOSPITAL ENCOUNTER (OUTPATIENT)
Age: 75
Discharge: HOME | End: 2020-10-08
Payer: COMMERCIAL

## 2020-10-08 LAB
ANION GAP SERPL CALCULATED.3IONS-SCNC: 10 MMOL/L (ref 9–17)
BUN BLDV-MCNC: 25 MG/DL (ref 8–23)
BUN/CREAT BLD: 32 (ref 9–20)
CALCIUM SERPL-MCNC: 9.6 MG/DL (ref 8.6–10.4)
CHLORIDE BLD-SCNC: 103 MMOL/L (ref 98–107)
CO2: 26 MMOL/L (ref 20–31)
CREAT SERPL-MCNC: 0.77 MG/DL (ref 0.5–0.9)
GFR AFRICAN AMERICAN: >60 ML/MIN
GFR NON-AFRICAN AMERICAN: >60 ML/MIN
GFR SERPL CREATININE-BSD FRML MDRD: ABNORMAL ML/MIN/{1.73_M2}
GFR SERPL CREATININE-BSD FRML MDRD: ABNORMAL ML/MIN/{1.73_M2}
GLUCOSE BLD-MCNC: 97 MG/DL (ref 70–99)
POTASSIUM SERPL-SCNC: 3.8 MMOL/L (ref 3.7–5.3)
SODIUM BLD-SCNC: 139 MMOL/L (ref 135–144)

## 2020-10-08 PROCEDURE — 36415 COLL VENOUS BLD VENIPUNCTURE: CPT

## 2020-10-08 PROCEDURE — 80048 BASIC METABOLIC PNL TOTAL CA: CPT

## 2020-12-14 ENCOUNTER — HOSPITAL ENCOUNTER (OUTPATIENT)
Age: 75
Discharge: HOME | End: 2020-12-14
Payer: COMMERCIAL

## 2020-12-14 LAB
ABSOLUTE EOS #: 0.3 K/UL (ref 0–0.44)
ABSOLUTE IMMATURE GRANULOCYTE: 0 K/UL (ref 0–0.3)
ABSOLUTE LYMPH #: 1.62 K/UL (ref 1.1–3.7)
ABSOLUTE MONO #: 0.91 K/UL (ref 0.1–1.2)
BASOPHILS # BLD: 0 % (ref 0–2)
BASOPHILS ABSOLUTE: 0 K/UL (ref 0–0.2)
DIFFERENTIAL TYPE: ABNORMAL
EOSINOPHILS RELATIVE PERCENT: 3 % (ref 1–4)
HCT VFR BLD CALC: 37.3 % (ref 36.3–47.1)
HEMOGLOBIN: 11.6 G/DL (ref 11.9–15.1)
IMMATURE GRANULOCYTES: 0 %
LYMPHOCYTES # BLD: 16 % (ref 24–43)
MCH RBC QN AUTO: 31.5 PG (ref 25.2–33.5)
MCHC RBC AUTO-ENTMCNC: 31.1 G/DL (ref 28.4–34.8)
MCV RBC AUTO: 101.4 FL (ref 82.6–102.9)
MONOCYTES # BLD: 9 % (ref 3–12)
MORPHOLOGY: NORMAL
NRBC AUTOMATED: 0 PER 100 WBC
PDW BLD-RTO: 13.3 % (ref 11.8–14.4)
PLATELET # BLD: 215 K/UL (ref 138–453)
PLATELET ESTIMATE: ABNORMAL
PMV BLD AUTO: 10.3 FL (ref 8.1–13.5)
RBC # BLD: 3.68 M/UL (ref 3.95–5.11)
RBC # BLD: ABNORMAL 10*6/UL
SEG NEUTROPHILS: 72 % (ref 36–65)
SEGMENTED NEUTROPHILS ABSOLUTE COUNT: 7.27 K/UL (ref 1.5–8.1)
WBC # BLD: 10.1 K/UL (ref 3.5–11.3)
WBC # BLD: ABNORMAL 10*3/UL

## 2020-12-14 PROCEDURE — 85025 COMPLETE CBC W/AUTO DIFF WBC: CPT

## 2020-12-14 PROCEDURE — 82746 ASSAY OF FOLIC ACID SERUM: CPT

## 2020-12-14 PROCEDURE — 36415 COLL VENOUS BLD VENIPUNCTURE: CPT

## 2020-12-14 PROCEDURE — 82607 VITAMIN B-12: CPT

## 2020-12-17 LAB
FOLATE: 19.3 NG/ML
VITAMIN B-12: 434 PG/ML (ref 232–1245)

## 2021-06-24 ENCOUNTER — HOSPITAL ENCOUNTER (OUTPATIENT)
Age: 76
Discharge: HOME | End: 2021-06-24
Payer: COMMERCIAL

## 2021-06-24 ENCOUNTER — OFFICE VISIT (OUTPATIENT)
Dept: PRIMARY CARE CLINIC | Age: 76
End: 2021-06-24
Payer: COMMERCIAL

## 2021-06-24 VITALS
SYSTOLIC BLOOD PRESSURE: 136 MMHG | WEIGHT: 170 LBS | HEART RATE: 72 BPM | DIASTOLIC BLOOD PRESSURE: 80 MMHG | HEIGHT: 55 IN | RESPIRATION RATE: 16 BRPM | BODY MASS INDEX: 39.34 KG/M2

## 2021-06-24 DIAGNOSIS — Z00.00 ROUTINE GENERAL MEDICAL EXAMINATION AT A HEALTH CARE FACILITY: ICD-10-CM

## 2021-06-24 DIAGNOSIS — Z00.00 ENCOUNTER FOR MEDICARE ANNUAL WELLNESS EXAM: Primary | ICD-10-CM

## 2021-06-24 DIAGNOSIS — I10 ESSENTIAL HYPERTENSION: Chronic | ICD-10-CM

## 2021-06-24 DIAGNOSIS — E03.9 HYPOTHYROIDISM, UNSPECIFIED TYPE: ICD-10-CM

## 2021-06-24 DIAGNOSIS — Z00.00 ENCOUNTER FOR MEDICARE ANNUAL WELLNESS EXAM: ICD-10-CM

## 2021-06-24 LAB
ABSOLUTE EOS #: 0.13 K/UL (ref 0–0.44)
ABSOLUTE IMMATURE GRANULOCYTE: <0.03 K/UL (ref 0–0.3)
ABSOLUTE LYMPH #: 1.4 K/UL (ref 1.1–3.7)
ABSOLUTE MONO #: 0.81 K/UL (ref 0.1–1.2)
ALBUMIN SERPL-MCNC: 4.3 G/DL (ref 3.5–5.2)
ALBUMIN/GLOBULIN RATIO: 1.5 (ref 1–2.5)
ALP BLD-CCNC: 79 U/L (ref 35–104)
ALT SERPL-CCNC: 21 U/L (ref 5–33)
ANION GAP SERPL CALCULATED.3IONS-SCNC: 10 MMOL/L (ref 9–17)
AST SERPL-CCNC: 25 U/L
BASOPHILS # BLD: 0 % (ref 0–2)
BASOPHILS ABSOLUTE: <0.03 K/UL (ref 0–0.2)
BILIRUB SERPL-MCNC: 0.44 MG/DL (ref 0.3–1.2)
BUN BLDV-MCNC: 27 MG/DL (ref 8–23)
BUN/CREAT BLD: 39 (ref 9–20)
CALCIUM SERPL-MCNC: 10.2 MG/DL (ref 8.6–10.4)
CHLORIDE BLD-SCNC: 102 MMOL/L (ref 98–107)
CHOLESTEROL/HDL RATIO: 3.1
CHOLESTEROL: 150 MG/DL
CO2: 28 MMOL/L (ref 20–31)
CREAT SERPL-MCNC: 0.7 MG/DL (ref 0.5–0.9)
DIFFERENTIAL TYPE: ABNORMAL
EOSINOPHILS RELATIVE PERCENT: 3 % (ref 1–4)
GFR AFRICAN AMERICAN: >60 ML/MIN
GFR NON-AFRICAN AMERICAN: >60 ML/MIN
GFR SERPL CREATININE-BSD FRML MDRD: ABNORMAL ML/MIN/{1.73_M2}
GFR SERPL CREATININE-BSD FRML MDRD: ABNORMAL ML/MIN/{1.73_M2}
GLUCOSE BLD-MCNC: 102 MG/DL (ref 70–99)
HCT VFR BLD CALC: 37.5 % (ref 36.3–47.1)
HDLC SERPL-MCNC: 48 MG/DL
HEMOGLOBIN: 11.9 G/DL (ref 11.9–15.1)
IMMATURE GRANULOCYTES: 0 %
LDL CHOLESTEROL: 73 MG/DL (ref 0–130)
LYMPHOCYTES # BLD: 28 % (ref 24–43)
MCH RBC QN AUTO: 32.3 PG (ref 25.2–33.5)
MCHC RBC AUTO-ENTMCNC: 31.7 G/DL (ref 28.4–34.8)
MCV RBC AUTO: 101.9 FL (ref 82.6–102.9)
MONOCYTES # BLD: 16 % (ref 3–12)
NRBC AUTOMATED: 0 PER 100 WBC
PDW BLD-RTO: 13.3 % (ref 11.8–14.4)
PLATELET # BLD: 170 K/UL (ref 138–453)
PLATELET ESTIMATE: ABNORMAL
PMV BLD AUTO: 9.7 FL (ref 8.1–13.5)
POTASSIUM SERPL-SCNC: 4 MMOL/L (ref 3.7–5.3)
RBC # BLD: 3.68 M/UL (ref 3.95–5.11)
RBC # BLD: ABNORMAL 10*6/UL
SEG NEUTROPHILS: 53 % (ref 36–65)
SEGMENTED NEUTROPHILS ABSOLUTE COUNT: 2.73 K/UL (ref 1.5–8.1)
SODIUM BLD-SCNC: 140 MMOL/L (ref 135–144)
TOTAL PROTEIN: 7.1 G/DL (ref 6.4–8.3)
TRIGL SERPL-MCNC: 143 MG/DL
TSH SERPL DL<=0.05 MIU/L-ACNC: 2.9 MIU/L (ref 0.3–5)
VLDLC SERPL CALC-MCNC: NORMAL MG/DL (ref 1–30)
WBC # BLD: 5.1 K/UL (ref 3.5–11.3)
WBC # BLD: ABNORMAL 10*3/UL

## 2021-06-24 PROCEDURE — 80053 COMPREHEN METABOLIC PANEL: CPT

## 2021-06-24 PROCEDURE — 1123F ACP DISCUSS/DSCN MKR DOCD: CPT | Performed by: FAMILY MEDICINE

## 2021-06-24 PROCEDURE — 85025 COMPLETE CBC W/AUTO DIFF WBC: CPT

## 2021-06-24 PROCEDURE — 36415 COLL VENOUS BLD VENIPUNCTURE: CPT

## 2021-06-24 PROCEDURE — 84443 ASSAY THYROID STIM HORMONE: CPT

## 2021-06-24 PROCEDURE — 4040F PNEUMOC VAC/ADMIN/RCVD: CPT | Performed by: FAMILY MEDICINE

## 2021-06-24 PROCEDURE — G0438 PPPS, INITIAL VISIT: HCPCS | Performed by: FAMILY MEDICINE

## 2021-06-24 PROCEDURE — 80061 LIPID PANEL: CPT

## 2021-06-24 RX ORDER — ASPIRIN 81 MG/1
81 TABLET ORAL DAILY
COMMUNITY

## 2021-06-24 RX ORDER — LISINOPRIL 10 MG/1
10 TABLET ORAL DAILY
Qty: 90 TABLET | Refills: 0 | Status: SHIPPED | OUTPATIENT
Start: 2021-06-24 | End: 2021-09-14 | Stop reason: SDUPTHER

## 2021-06-24 ASSESSMENT — PATIENT HEALTH QUESTIONNAIRE - PHQ9
10. IF YOU CHECKED OFF ANY PROBLEMS, HOW DIFFICULT HAVE THESE PROBLEMS MADE IT FOR YOU TO DO YOUR WORK, TAKE CARE OF THINGS AT HOME, OR GET ALONG WITH OTHER PEOPLE: 0
9. THOUGHTS THAT YOU WOULD BE BETTER OFF DEAD, OR OF HURTING YOURSELF: 0
SUM OF ALL RESPONSES TO PHQ9 QUESTIONS 1 & 2: 0
7. TROUBLE CONCENTRATING ON THINGS, SUCH AS READING THE NEWSPAPER OR WATCHING TELEVISION: 0
4. FEELING TIRED OR HAVING LITTLE ENERGY: 1
5. POOR APPETITE OR OVEREATING: 0
3. TROUBLE FALLING OR STAYING ASLEEP: 0
1. LITTLE INTEREST OR PLEASURE IN DOING THINGS: 0
SUM OF ALL RESPONSES TO PHQ QUESTIONS 1-9: 1
SUM OF ALL RESPONSES TO PHQ QUESTIONS 1-9: 1
2. FEELING DOWN, DEPRESSED OR HOPELESS: 0
SUM OF ALL RESPONSES TO PHQ QUESTIONS 1-9: 1
8. MOVING OR SPEAKING SO SLOWLY THAT OTHER PEOPLE COULD HAVE NOTICED. OR THE OPPOSITE, BEING SO FIGETY OR RESTLESS THAT YOU HAVE BEEN MOVING AROUND A LOT MORE THAN USUAL: 0
6. FEELING BAD ABOUT YOURSELF - OR THAT YOU ARE A FAILURE OR HAVE LET YOURSELF OR YOUR FAMILY DOWN: 0

## 2021-06-24 NOTE — PROGRESS NOTES
Patient is here for [facility] Annual Wellness Visit. She also has complaints of arthritis. She said that this has been an issue for a while. She said she has arthritis all over, but it is the worst in her hands. Medicare Annual Wellness Visit  Name: Cirilo Saeed Date: 2021   MRN: [id number] Sex: Female   Age: 64 y.o. Ethnicity: Non-/Non    : 1945 Race: Amilcar Bush is here for Medicare AWV    Screenings for behavioral, psychosocial and functional/safety risks, and cognitive dysfunction are all negative except as indicated below. These results, as well as other patient data from the [address] form, are documented in Flowsheets linked to this Encounter. No Known Allergies      Prior to Visit Medications    Medication Sig Taking? Authorizing Provider   aspirin 81 MG chewable tablet Take 81 mg by mouth daily Yes Historical Provider, MD   lisinopril (PRINIVIL;ZESTRIL) 10 MG tablet Take 1 tablet by mouth daily Yes Muriel Feldman MD   levothyroxine (SYNTHROID) 88 MCG tablet Take 1 tablet by mouth daily Yes Muriel Feldman MD   hydroCHLOROthiazide (HYDRODIURIL) 25 MG tablet Take 1 tablet by mouth every other day Yes Muriel Feldman MD   Calcium Carbonate (CALCIUM 600 PO) Take by mouth daily Yes Historical Provider, MD   Multiple Vitamins-Minerals (PRESERVISION AREDS 2) CAPS Take by mouth daily Yes Historical Provider, MD   acetaminophen (TYLENOL) 500 MG tablet Take 500 mg by mouth every 6 hours as needed for Pain Yes Historical Provider, MD   Cholecalciferol (VITAMIN D3) 2000 UNITS CAPS Take 1,000 Int'l Units/day by mouth. Yes Historical Provider, MD   traMADol (ULTRAM) 50 MG tablet Take 50 mg by mouth every 6 hours as needed for Pain.   Patient not taking: Reported on 2021  Historical Provider, MD         Past Medical History:   Diagnosis Date    Arthritis     osteo    Edema     Hyperlipidemia     Hypertension     Hypothyroidism     Kidney stones     Osteoarthritis     Thyroid disease     Unspecified transient cerebral ischemia        Past Surgical History:   Procedure Laterality Date    APPENDECTOMY      CATARACT REMOVAL       SECTION      x4    CORNEAL TRANSPLANT      bilat    FINGER SURGERY Left 2018    INDEX FINGER    HAMMER TOE SURGERY      right and left    JOINT REPLACEMENT Left 10/15/14    TKA    KNEE ARTHROSCOPY Left     LITHOTRIPSY      REPLACEMENT SHOULDER TOTAL Right 2019    SHOULDER TOTAL ARTHROPLASTY, OPEN PARTIAL CLAVICULECTOMY performed by Susana Fairbanks MD at [address] Right 2019    Dr. Jennifer Beasley           Family History   Problem Relation Age of Onset    Diabetes Mother     High Blood Pressure Mother     Diabetes Father     Cancer Father         lung    High Blood Pressure Father     Diabetes Sister     Heart Disease Sister        CareTeam (Including outside providers/suppliers regularly involved in providing care):   Patient Care Team:  Fco Wen MD as PCP - Emory Olivas MD as PCP - [facility] Empaneled Provider   Dr Ryann Sarmiento- podiatrist  Syed Barba    Wt Readings from Last 3 Encounters:   21 170 lb (77.1 kg)   21 171 lb (77.6 kg)   20 170 lb (77.1 kg)     Vitals:    21 1505   BP: 136/80   Site: Left Upper Arm   Position: Sitting   Pulse: 72   Resp: 16   Weight: 170 lb (77.1 kg)   Height: 4' 7\" (1.397 m)     Body mass index is 39.51 kg/m². Based upon direct observation of the patient, evaluation of cognition reveals recent and remote memory intact.     General Appearance: alert and oriented to person, place and time, well developed and well- nourished, in no acute distress  Skin: warm and dry, no rash or erythema  Head: normocephalic and atraumatic  Eyes: pupils equal, round, and reactive to light, extraocular eye movements intact, conjunctivae normal  ENT: tympanic membrane, external ear and ear canal normal bilaterally, nose without deformity, nasal mucosa and turbinates normal without polyps  Neck: supple and non-tender without mass, no thyromegaly or thyroid nodules, no cervical lymphadenopathy  Pulmonary/Chest: clear to auscultation bilaterally- no wheezes, rales or rhonchi, normal air movement, no respiratory distress  Cardiovascular: normal rate, regular rhythm, normal S1 and S2, no murmurs, rubs, clicks, or gallops, distal pulses intact, no carotid bruits  Abdomen: soft, non-tender, non-distended, normal bowel sounds, no masses or organomegaly  Extremities: no cyanosis, clubbing has bilat non pitting edema  Musculoskeletal: has diminished range of motion, has multiple  joint swelling,has multiple deformity or tenderness  Neurologic: reflexes normal and symmetric, no cranial nerve deficit, gait, coordination and speech normal    Patient's complete Health Risk Assessment and screening values have been reviewed and are found in Flowsheets. The following problems were reviewed today and where indicated follow up appointments were made and/or referrals ordered. Positive Risk Factor Screenings with Interventions:            General Health and ACP:  General  In general, how would you say your health is?: Good  In the past 7 days, have you experienced any of the following?  New or Increased Pain, New or Increased Fatigue, Loneliness, Social Isolation, Stress or Anger?: None of These  Do you get the social and emotional support that you need?: Yes  Do you have a Living Will?: (!) No  Advance Directives     Power of [facility] Will ACP-Advance Directive ACP-Power of     Not on File Not on File Not on File Not on File      General Health Risk Interventions:  · No Living Will: Advance Care Planning addressed with patient today    Health Habits/Nutrition:  Health Habits/Nutrition  Do you exercise for at least 20 minutes 2-3 times per week?: (!) No  Have you lost any weight without trying in the past 3 months?: No  Do you eat only one meal per day?: No  Have you seen the dentist within the past year?: Yes  Body mass index: (!) 39.51  Health Habits/Nutrition Interventions:  · none     Safety:  Safety  Do you have working smoke detectors?: (!) No  Have all throw rugs been removed or fastened?: Yes  Do you have non-slip mats or surfaces in all bathtubs/showers?: (!) No  Do all of your stairways have a railing or banister?: Yes  Are your doorways, halls and stairs free of clutter?: Yes  Do you always fasten your seatbelt when you are in a car?: Yes  Safety Interventions:  · Home safety tips provided     Personalized Preventive Plan   Current Health Maintenance Status  Immunization History   Administered Date(s) Administered    COVID-19, Diaz Peter, PF, 30mcg/0.3mL 04/08/2021, 05/01/2021    DTaP 01/07/1993    Influenza Virus Vaccine 09/24/2014, 09/23/2015    Influenza Whole 11/13/2015    Influenza, High Dose (Fluzone 65 yrs and older) 10/02/2016    Influenza, High-dose, Quadv, 65 yrs +, IM (Fluzone) 09/03/2020    Influenza, Triv, 3 Years and older, IM, PF (Afluria 5yrs and older) 09/19/2017    Influenza, Triv, inactivated, subunit, adjuvanted, IM (Fluad 65 yrs and older) 09/24/2018, 09/26/2019    Pneumococcal Conjugate 13-valent (Hyxatsb24) 10/02/2016    Pneumococcal Conjugate 7-valent (Prevnar7) 10/28/2011    Pneumococcal Polysaccharide (Cotpuovcr23) 12/19/2017    Tdap (Boostrix, Adacel) 11/04/2016    Zoster Live (Zostavax) 09/23/2015        Health Maintenance   Topic Date Due    Hepatitis C screen  Never done    Shingles Vaccine (2 of 3) 11/18/2015    Annual Wellness Visit (AWV)  Never done    TSH testing  09/02/2021    Potassium monitoring  10/08/2021    Creatinine monitoring  10/08/2021    DTaP/Tdap/Td vaccine (3 - Td or Tdap) 11/04/2026    DEXA (modify frequency per FRAX score)  Completed    Flu vaccine  Completed    Pneumococcal 65+ years Vaccine  Completed    COVID-19 Vaccine Completed    Hepatitis A vaccine  Aged Out    Hepatitis B vaccine  Aged Out    Hib vaccine  Aged Out    Meningococcal (ACWY) vaccine  Aged Out     Recommendations for [employer] Due: see orders and patient instructions/AVS.  . Recommended screening schedule for the next 5-10 years is provided to the patient in written form: see Patient Instructions/AVS.    Farnklin Martínez was seen today for medicare awv. Diagnoses and all orders for this visit:    Encounter for Medicare annual wellness exam  -     CBC Auto Differential; Future  -     Comprehensive Metabolic Panel; Future  -     Lipid Panel; Future  -     TSH without Reflex; Future    Essential hypertension    Hypothyroidism, unspecified type    Other orders  -     lisinopril (PRINIVIL;ZESTRIL) 10 MG tablet; Take 1 tablet by mouth daily                 Advance Care Planning   Advanced Care Planning: Discussed the patients choices for care and treatment in case of a health event that adversely affects decision-making abilities. Also discussed the patients long-term treatment options. Reviewed with the patient the [address] [place] [address] Declaration forms  Reviewed the process of designating a competent adult as an Agent (or -in-fact) that could take make health care decisions for the patient if incompetent. Patient was asked to complete the declaration forms, either acknowledge the forms by a public notary or an eligible witness and provide a signed copy to the practice office.   Time spent (minutes): 20

## 2021-06-24 NOTE — PATIENT INSTRUCTIONS
SURVEY:    You may be receiving a survey from [employer] regarding your visit today. You may get this in the mail, through your MyChart, or in your email. Please complete the survey to enable us to provide the highest quality of care to you and your family. If you cannot score us a very good (5 Stars) on any question, please call the office to discuss how we could of made your experience exceptional.    Thank you! Dr. Emily Sharpe, FOUZIA Ralph, [address] [place] [address]    Phone: [phone]  Fax: [phone]    Office Hours:   Meg Smart, [facility] AT [place], F: 8-5 Wednesday: 9-11  Personalized Preventive Plan for Cyd Boxer - 6/24/2021  Medicare offers a range of preventive health benefits. Some of the tests and screenings are paid in full while other may be subject to a deductible, co-insurance, and/or copay. Some of these benefits include a comprehensive review of your medical history including lifestyle, illnesses that may run in your family, and various assessments and screenings as appropriate. After reviewing your medical record and screening and assessments performed today your provider may have ordered immunizations, labs, imaging, and/or referrals for you. A list of these orders (if applicable) as well as your Preventive Care list are included within your After Visit Summary for your review. Other Preventive Recommendations:    · A preventive eye exam performed by an eye specialist is recommended every 1-2 years to screen for glaucoma; cataracts, macular degeneration, and other eye disorders. · A preventive dental visit is recommended every 6 months. · Try to get at least 150 minutes of exercise per week or 10,000 steps per day on a pedometer . · Order or download the FREE \"Exercise & Physical Activity: Your Everyday Guide\" from [employer]. Call 3-899.892.5035 or search [employer] online.   · You need 9141-2008 mg of calcium and 6299-8614 IU of vitamin D per day. It is possible to meet your calcium requirement with diet alone, but a vitamin D supplement is usually necessary to meet this goal.  · When exposed to the sun, use a sunscreen that protects against both UVA and UVB radiation with an SPF of 30 or greater. Reapply every 2 to 3 hours or after sweating, drying off with a towel, or swimming. · Always wear a seat belt when traveling in a car. Always wear a helmet when riding a bicycle or motorcycle.

## 2021-07-06 DIAGNOSIS — E03.9 HYPOTHYROIDISM, UNSPECIFIED TYPE: ICD-10-CM

## 2021-07-06 RX ORDER — LEVOTHYROXINE SODIUM 88 UG/1
88 TABLET ORAL DAILY
Qty: 90 TABLET | Refills: 0 | Status: SHIPPED | OUTPATIENT
Start: 2021-07-06 | End: 2021-10-05 | Stop reason: SDUPTHER

## 2021-07-06 RX ORDER — HYDROCHLOROTHIAZIDE 25 MG/1
25 TABLET ORAL EVERY OTHER DAY
Qty: 90 TABLET | Refills: 0 | Status: SHIPPED | OUTPATIENT
Start: 2021-07-06 | End: 2021-12-27 | Stop reason: SDUPTHER

## 2021-09-14 RX ORDER — LISINOPRIL 10 MG/1
10 TABLET ORAL DAILY
Qty: 90 TABLET | Refills: 0 | Status: SHIPPED | OUTPATIENT
Start: 2021-09-14 | End: 2021-12-17 | Stop reason: SDUPTHER

## 2021-09-23 ENCOUNTER — OFFICE VISIT (OUTPATIENT)
Dept: PRIMARY CARE CLINIC | Age: 76
End: 2021-09-23
Payer: COMMERCIAL

## 2021-09-23 VITALS
SYSTOLIC BLOOD PRESSURE: 126 MMHG | DIASTOLIC BLOOD PRESSURE: 79 MMHG | WEIGHT: 167.4 LBS | BODY MASS INDEX: 38.91 KG/M2 | RESPIRATION RATE: 16 BRPM | HEART RATE: 72 BPM

## 2021-09-23 DIAGNOSIS — E03.9 HYPOTHYROIDISM, UNSPECIFIED TYPE: ICD-10-CM

## 2021-09-23 DIAGNOSIS — I10 ESSENTIAL HYPERTENSION: Primary | ICD-10-CM

## 2021-09-23 PROCEDURE — 4040F PNEUMOC VAC/ADMIN/RCVD: CPT | Performed by: FAMILY MEDICINE

## 2021-09-23 PROCEDURE — 1036F TOBACCO NON-USER: CPT | Performed by: FAMILY MEDICINE

## 2021-09-23 PROCEDURE — 1090F PRES/ABSN URINE INCON ASSESS: CPT | Performed by: FAMILY MEDICINE

## 2021-09-23 PROCEDURE — G8427 DOCREV CUR MEDS BY ELIG CLIN: HCPCS | Performed by: FAMILY MEDICINE

## 2021-09-23 PROCEDURE — G8399 PT W/DXA RESULTS DOCUMENT: HCPCS | Performed by: FAMILY MEDICINE

## 2021-09-23 PROCEDURE — G8417 CALC BMI ABV UP PARAM F/U: HCPCS | Performed by: FAMILY MEDICINE

## 2021-09-23 PROCEDURE — 1123F ACP DISCUSS/DSCN MKR DOCD: CPT | Performed by: FAMILY MEDICINE

## 2021-09-23 PROCEDURE — 99211 OFF/OP EST MAY X REQ PHY/QHP: CPT

## 2021-09-23 PROCEDURE — 99213 OFFICE O/P EST LOW 20 MIN: CPT | Performed by: FAMILY MEDICINE

## 2021-09-23 SDOH — ECONOMIC STABILITY: FOOD INSECURITY: WITHIN THE PAST 12 MONTHS, THE FOOD YOU BOUGHT JUST DIDN'T LAST AND YOU DIDN'T HAVE MONEY TO GET MORE.: NEVER TRUE

## 2021-09-23 SDOH — ECONOMIC STABILITY: FOOD INSECURITY: WITHIN THE PAST 12 MONTHS, YOU WORRIED THAT YOUR FOOD WOULD RUN OUT BEFORE YOU GOT MONEY TO BUY MORE.: NEVER TRUE

## 2021-09-23 SDOH — ECONOMIC STABILITY: TRANSPORTATION INSECURITY
IN THE PAST 12 MONTHS, HAS LACK OF TRANSPORTATION KEPT YOU FROM MEETINGS, WORK, OR FROM GETTING THINGS NEEDED FOR DAILY LIVING?: NO

## 2021-09-23 SDOH — ECONOMIC STABILITY: TRANSPORTATION INSECURITY
IN THE PAST 12 MONTHS, HAS THE LACK OF TRANSPORTATION KEPT YOU FROM MEDICAL APPOINTMENTS OR FROM GETTING MEDICATIONS?: NO

## 2021-09-23 ASSESSMENT — SOCIAL DETERMINANTS OF HEALTH (SDOH): HOW HARD IS IT FOR YOU TO PAY FOR THE VERY BASICS LIKE FOOD, HOUSING, MEDICAL CARE, AND HEATING?: NOT HARD AT ALL

## 2021-09-23 NOTE — PATIENT INSTRUCTIONS
SURVEY:    You may be receiving a survey from [employer] regarding your visit today. You may get this in the mail, through your MyChart, or in your email. Please complete the survey to enable us to provide the highest quality of care to you and your family. If you cannot score us a very good (5 Stars) on any question, please call the office to discuss how we could of made your experience exceptional.    Thank you!     Dr. Wally Kumar, FOUZIA Noel, KIERAN Purvis, [address] [place] [address], [address]    Phone: [phone]  Fax: [phone]    Office Hours:   Leticia Starks [place], F: 8-5 Wednesday: 9-11

## 2021-09-23 NOTE — PROGRESS NOTES
Hypertension: Patient here for follow-up of elevated blood pressure. She is not exercising and is adherent to low salt diet. Blood pressure is not well controlled at home. Patient has not recently taken her blood pressure at home. Cardiac symptoms none. Patient denies chest pain, fatigue and palpitations. Cardiovascular risk factors: advanced age (older than 54 for men, 72 for women), hypertension and obesity (BMI >= 30 kg/m2). Use of agents associated with hypertension: none. Hyperlipidemia: Patient presents with hyperlipidemia. There is a family history of hyperlipidemia. Hypothyroidism: Patient denies fatigue. She said that she does lose hair when she washes her hair. Osteoarthritis: She states she gets occasional aches pains. She said no more than normal.      Allergies:  Patient has no known allergies.     Past Medical History:    Past Medical History:   Diagnosis Date    Arthritis     osteo    Edema     Hyperlipidemia     Hypertension     Hypothyroidism     Kidney stones     Osteoarthritis     Thyroid disease     Unspecified transient cerebral ischemia        Past Surgical History:    Past Surgical History:   Procedure Laterality Date    APPENDECTOMY      CATARACT REMOVAL       SECTION      x4    CORNEAL TRANSPLANT      bilat    FINGER SURGERY Left 2018    INDEX FINGER    HAMMER TOE SURGERY      right and left    JOINT REPLACEMENT Left 10/15/14    TKA    KNEE ARTHROSCOPY Left     LITHOTRIPSY      REPLACEMENT SHOULDER TOTAL Right 2019    SHOULDER TOTAL ARTHROPLASTY, OPEN PARTIAL CLAVICULECTOMY performed by Colletta Jaegers, MD at [facility] Right 2019    Dr. Vladimir Alves History:   Social History     Tobacco Use    Smoking status: Never Smoker    Smokeless tobacco: Never Used   Substance Use Topics    Alcohol use: No       Family History:   Family History   Problem Relation Age of Onset    Diabetes Mother  High Blood Pressure Mother     Diabetes Father     Cancer Father         lung    High Blood Pressure Father     Diabetes Sister     Heart Disease Sister          Review of Systems:  Constitutional: negative for fevers or chills  Eyes: negative for visual disturbance   ENT: negative for sore throat or nasal congestion  Respiratory: no cough or shortness of breath  Cardiovascular: negative for chest pain or palpitations  Gastrointestinal: negative for abd pain, nausea, vomiting, diarrhea or constipation  Genitourinary: negative for dysuria, urgency or frequency  Integument/breast: negative for skin rash or lesions  Neurological: negative for unilateral weakness, numbness or tingling. Skeletal Muscular: has some joint pain and deformities     Medication List          Accurate as of September 23, 2021  4:35 PM. If you have any questions, ask your nurse or doctor. CONTINUE taking these medications    aspirin 81 MG chewable tablet     CALCIUM 600 PO     hydroCHLOROthiazide 25 MG tablet  Commonly known as: HYDRODIURIL  Take 1 tablet by mouth every other day     levothyroxine 88 MCG tablet  Commonly known as: SYNTHROID  Take 1 tablet by mouth daily     lisinopril 10 MG tablet  Commonly known as: PRINIVIL;ZESTRIL  Take 1 tablet by mouth daily     PreserVision AREDS 2 Caps     traMADol 50 MG tablet  Commonly known as: ULTRAM     TYLENOL 500 MG tablet  Generic drug: acetaminophen     Vitamin D3 50 MCG (2000 UT) Caps            Objective:  Physical Exam:  VitalsBP 126/79 (Site: Left Upper Arm, Position: Sitting)   Pulse 72   Resp 16   Wt 167 lb 6.4 oz (75.9 kg)   BMI 38.91 kg/m²   GEN:   A & O x3, no apparent distress  EYES: No gross abnormalities.   ENT:ENT exam normal, no neck nodes or sinus tenderness  NECK: normal, no lymphadenopathy,  no carotid bruits  PULM: clear to auscultation bilaterally- no wheezes, rales or rhonchi, normal air movement, no respiratory distress  COR: regular rate & rhythm, no murmurs and no gallops  ABD:  soft, non-tender, non-distended, normal bowel sounds, no masses or organomegaly  : deferred  EXT: has deformed joints, has non pitting edema  NEURO: Motor and sensory grossly intact  SKIN:  No skin lesions or rashes      Labs:  Lab Results   Component Value Date    BUN 27 (H) 06/24/2021    CREATININE 0.70 06/24/2021     06/24/2021    K 4.0 06/24/2021    CALCIUM 10.2 06/24/2021     06/24/2021    CO2 28 06/24/2021    LABGLOM >60 06/24/2021    CHOL 150 06/24/2021    LDLCHOLESTEROL 73 06/24/2021    HDL 48 06/24/2021    TRIG 143 06/24/2021    ALT 21 06/24/2021    AST 25 06/24/2021       Assessment:  1. Essential hypertension    2. Hypothyroidism, unspecified type      Patient Active Problem List   Diagnosis Code    Transient cerebral ischemia G45.9    Essential hypertension I10    Hyperlipidemia E78.5    Primary osteoarthritis involving multiple joints M89.49    Hypothyroidism E03.9    Primary osteoarthritis of right shoulder M19.011    Morbidly obese (HCC) E66.01     Plan:    Problem List     Essential hypertension - Primary (Chronic)- well controlled     Hypothyroidism- stable    Relevant Medications    levothyroxine (SYNTHROID) 88 MCG tablet            · Continue current medications  · Flu vaccine at work  · Encouraged low sodium, low cholesterol, weight loss diet. · Goal for blood pressure controll is 120/80  · Recommended regular exercise as tolerated, 3-5 times per day  · Labs: fasting lipids, ALT, AST and BMP in 6 months  · Follow up in 3 months  ·     No orders of the defined types were placed in this encounter.       Current Outpatient Medications   Medication Sig Dispense Refill    lisinopril (PRINIVIL;ZESTRIL) 10 MG tablet Take 1 tablet by mouth daily 90 tablet 0    levothyroxine (SYNTHROID) 88 MCG tablet Take 1 tablet by mouth daily 90 tablet 0    hydroCHLOROthiazide (HYDRODIURIL) 25 MG tablet Take 1 tablet by mouth every other day 90 tablet 0    aspirin 81 MG chewable tablet Take 81 mg by mouth daily      Calcium Carbonate (CALCIUM 600 PO) Take by mouth daily      Multiple Vitamins-Minerals (PRESERVISION AREDS 2) CAPS Take by mouth daily      acetaminophen (TYLENOL) 500 MG tablet Take 500 mg by mouth every 6 hours as needed for Pain      Cholecalciferol (VITAMIN D3) 2000 UNITS CAPS Take 1,000 Int'l Units/day by mouth.  traMADol (ULTRAM) 50 MG tablet Take 50 mg by mouth every 6 hours as needed for Pain. (Patient not taking: Reported on 6/24/2021)       No current facility-administered medications for this visit. Return in about 3 months (around 12/23/2021) for hypertension.       Electronically signed by Brock Campos MD on 9/23/2021 at 4:35 PM

## 2021-10-04 DIAGNOSIS — E03.9 HYPOTHYROIDISM, UNSPECIFIED TYPE: ICD-10-CM

## 2021-10-05 RX ORDER — LEVOTHYROXINE SODIUM 88 UG/1
88 TABLET ORAL DAILY
Qty: 90 TABLET | Refills: 0 | Status: SHIPPED | OUTPATIENT
Start: 2021-10-05 | End: 2021-12-27 | Stop reason: SDUPTHER

## 2021-12-17 RX ORDER — LISINOPRIL 10 MG/1
10 TABLET ORAL DAILY
Qty: 90 TABLET | Refills: 0 | Status: SHIPPED | OUTPATIENT
Start: 2021-12-17 | End: 2022-03-21 | Stop reason: SDUPTHER

## 2021-12-21 ENCOUNTER — OFFICE VISIT (OUTPATIENT)
Dept: PRIMARY CARE CLINIC | Age: 76
End: 2021-12-21
Payer: COMMERCIAL

## 2021-12-21 VITALS
WEIGHT: 168.59 LBS | SYSTOLIC BLOOD PRESSURE: 131 MMHG | DIASTOLIC BLOOD PRESSURE: 85 MMHG | RESPIRATION RATE: 16 BRPM | HEART RATE: 82 BPM | BODY MASS INDEX: 39.18 KG/M2

## 2021-12-21 DIAGNOSIS — E03.9 HYPOTHYROIDISM, UNSPECIFIED TYPE: ICD-10-CM

## 2021-12-21 DIAGNOSIS — I10 ESSENTIAL HYPERTENSION: ICD-10-CM

## 2021-12-21 DIAGNOSIS — R21 SKIN RASH: ICD-10-CM

## 2021-12-21 DIAGNOSIS — L03.317 CELLULITIS OF BUTTOCK: Primary | ICD-10-CM

## 2021-12-21 PROCEDURE — 1036F TOBACCO NON-USER: CPT | Performed by: STUDENT IN AN ORGANIZED HEALTH CARE EDUCATION/TRAINING PROGRAM

## 2021-12-21 PROCEDURE — 99211 OFF/OP EST MAY X REQ PHY/QHP: CPT

## 2021-12-21 PROCEDURE — 4040F PNEUMOC VAC/ADMIN/RCVD: CPT | Performed by: STUDENT IN AN ORGANIZED HEALTH CARE EDUCATION/TRAINING PROGRAM

## 2021-12-21 PROCEDURE — G8427 DOCREV CUR MEDS BY ELIG CLIN: HCPCS | Performed by: STUDENT IN AN ORGANIZED HEALTH CARE EDUCATION/TRAINING PROGRAM

## 2021-12-21 PROCEDURE — G8484 FLU IMMUNIZE NO ADMIN: HCPCS | Performed by: STUDENT IN AN ORGANIZED HEALTH CARE EDUCATION/TRAINING PROGRAM

## 2021-12-21 PROCEDURE — 1090F PRES/ABSN URINE INCON ASSESS: CPT | Performed by: STUDENT IN AN ORGANIZED HEALTH CARE EDUCATION/TRAINING PROGRAM

## 2021-12-21 PROCEDURE — G8417 CALC BMI ABV UP PARAM F/U: HCPCS | Performed by: STUDENT IN AN ORGANIZED HEALTH CARE EDUCATION/TRAINING PROGRAM

## 2021-12-21 PROCEDURE — 99214 OFFICE O/P EST MOD 30 MIN: CPT | Performed by: STUDENT IN AN ORGANIZED HEALTH CARE EDUCATION/TRAINING PROGRAM

## 2021-12-21 PROCEDURE — G8399 PT W/DXA RESULTS DOCUMENT: HCPCS | Performed by: STUDENT IN AN ORGANIZED HEALTH CARE EDUCATION/TRAINING PROGRAM

## 2021-12-21 PROCEDURE — 1123F ACP DISCUSS/DSCN MKR DOCD: CPT | Performed by: STUDENT IN AN ORGANIZED HEALTH CARE EDUCATION/TRAINING PROGRAM

## 2021-12-21 RX ORDER — CEPHALEXIN 500 MG/1
500 CAPSULE ORAL 4 TIMES DAILY
Qty: 21 CAPSULE | Refills: 0 | Status: SHIPPED | OUTPATIENT
Start: 2021-12-21 | End: 2021-12-28

## 2021-12-21 RX ORDER — CLOTRIMAZOLE AND BETAMETHASONE DIPROPIONATE 10; .64 MG/G; MG/G
CREAM TOPICAL 2 TIMES DAILY
Qty: 1 EACH | Refills: 0 | Status: SHIPPED | OUTPATIENT
Start: 2021-12-21 | End: 2022-01-04

## 2021-12-21 RX ORDER — AMOXICILLIN AND CLAVULANATE POTASSIUM 875; 125 MG/1; MG/1
1 TABLET, FILM COATED ORAL 2 TIMES DAILY
Qty: 20 TABLET | Refills: 0 | Status: SHIPPED | OUTPATIENT
Start: 2021-12-21 | End: 2021-12-21

## 2021-12-21 ASSESSMENT — ENCOUNTER SYMPTOMS
BLOOD IN STOOL: 0
ANAL BLEEDING: 0
DIARRHEA: 0
SINUS PAIN: 0
BACK PAIN: 0
SHORTNESS OF BREATH: 0
RECTAL PAIN: 0
WHEEZING: 0
ABDOMINAL PAIN: 0
COUGH: 0
ABDOMINAL DISTENTION: 0
SINUS PRESSURE: 0
COLOR CHANGE: 1
GASTROINTESTINAL NEGATIVE: 1
EYES NEGATIVE: 1
CONSTIPATION: 0
VOMITING: 0
NAUSEA: 0

## 2021-12-21 NOTE — PATIENT INSTRUCTIONS
SURVEY:    You may be receiving a survey from [employer] regarding your visit today. You may get this in the mail, through your MyChart, or in your email. Please complete the survey to enable us to provide the highest quality of care to you and your family. If you cannot score us a very good (5 Stars) on any question, please call the office to discuss how we could of made your experience exceptional.    Thank you!     Dr. Jaun Khanna, FOUZAI Hudson, KIERAN   [facility], [address] [place] [address], [address]    Phone: [phone]  Fax: [phone]    Office Hours:   Mercedes Lopez, F: 8-5 Wednesday: 9-11

## 2021-12-21 NOTE — PROGRESS NOTES
[address], [address] , [facility], [address]  1945 is a 64 y.o. female, Established patient, here for evaluation of the following chief complaint(s):    Hypertension, Hypothyroidism, and Lesion(s) (On left butt cheek)     ASSESSMENT/PLAN:  1. Cellulitis of buttock  -     cephALEXin (KEFLEX) 500 MG capsule; Take 1 capsule by mouth 4 times daily for 7 days, Disp-21 capsule, R-0Normal  2. Skin rash  -     clotrimazole-betamethasone (LOTRISONE) 1-0.05 % cream; Apply topically 2 times daily for 14 days Apply topically 2 times daily. , Topical, 2 TIMES DAILY Starting Tue 12/21/2021, Until Tue 1/4/2022, For 14 days, Disp-1 each, R-0, Normal  3. Essential hypertension  4. Hypothyroidism, unspecified type     BP Stable at this time, continue w/ HCTZ and Lisinopril 10mg, plan to recheck electrolytes in about 3 months' time per prior plans. Continue w/ synthroid dose, recheck TSH at 1 year intervals, sooner if indicated. Skin lesion/rash -  lesion appears to be dry/scaly with some surrounding erythema concerning for some cellulitis in the intertriginous left buttock with possible atopic dermatitis/fungal infection. Plan for Abx, topical lostrisone to area for about 2 weeks. Warm compress to the affected area PRN. F/U and re-evaluate, if the lesion persists in about 2 weeks, then plan for biopsy/possible I&D of the area. Derm referral could be considered if no resolution of symptoms or additional management recs. Medications Discontinued During This Encounter   Medication Reason    traMADol (ULTRAM) 50 MG tablet LIST CLEANUP    amoxicillin-clavulanate (AUGMENTIN) 875-125 MG per tablet LIST CLEANUP        Return in about 2 weeks (around 1/4/2022) for F/U Skin Lesion, erythema. Rohan Pizarro received counseling on the following healthy behaviors: nutrition, exercise and medication adherence.  I encouraged and discussed lifestyle modifications including diet and exercise and the patient was agreeable to making positive/beneficial changes to both to help improve their overall health. Discussed use, benefit, and side effects of prescribed medications. Barriers to medication compliance addressed. Patient given educational materials: see patient instructions. HM - HM items completed today as per orders. Outstanding HM items though not limited to immunizations were discussed with the patient today, including risks, benefits and alternatives. The patient will discuss these during the next appointment per their preference. If there are any worsening or concerning signs or symptoms, patient will report to the ED and/or contact [id number] for immediate evaluation. Teach back method was used. All patient questions answered. Pt voiced understanding. Subjective    SUBJECTIVE/OBJECTIVE:    HPI     Hypertension, Hypothyroidism, and Lesion(s) (On left butt cheek)    Hypertension: Patient here for follow-up of elevated blood pressure. She is not exercising and is adherent to low salt diet. Blood pressure is not well controlled at home. Patient does not take blood pressure at home. Cardiac symptoms none. Patient denies chest pain and fatigue. Cardiovascular risk factors: advanced age (older than 54 for men, 72 for women), hypertension and obesity (BMI >= 30 kg/m2). Use of agents associated with hypertension: none. Hypothyroidism: Patient denies fatigue at this time. Patients last lab work was done in June. Recent symptoms: hair loss ongoing. She denies fatigue, dry skin, constipation, diarrhea, edema, anxiety, tremor, palpitations and dysphagia. Patient is  taking her medication consistently on an empty stomach.   No results found for: [facility]  Lab Results   Component Value Date    TSH 2.90 06/24/2021    TSH 1.01 09/02/2020    TSH 0.64 10/23/2019     Skin Lesion on left buttock  Gen Null is a 64 y.o. female who presents with a several month history of a localized rash. Rash first presented on the left buttock and has not spread. Rash is red and is pruritic, scaly and possibly intermittently weeping. Associated symptoms include none. Patient has tried nothing. New exposures: none. Patient has not had contacts with similar symptoms. Prior history of similar symptoms: no.    Family History   Problem Relation Age of Onset    Diabetes Mother     High Blood Pressure Mother     Diabetes Father     Cancer Father         lung    High Blood Pressure Father     Diabetes Sister     Heart Disease Sister      Health Maintenance   Alcohol/Substance use History - None  Smoking History    Tobacco Use      Smoking status: Never Smoker      Smokeless tobacco: Never Used    Health Maintenance   Topic Date Due    Hepatitis C screen  Never done    COVID-19 Vaccine (3 - Booster for Pfizer series) 11/01/2021    TSH testing  06/24/2022    Potassium monitoring  06/24/2022    Creatinine monitoring  06/24/2022    DTaP/Tdap/Td vaccine (3 - Td or Tdap) 11/04/2026    DEXA (modify frequency per FRAX score)  Completed    Flu vaccine  Completed    Shingles Vaccine  Completed    Pneumococcal 65+ years Vaccine  Completed    Hepatitis A vaccine  Aged Out    Hepatitis B vaccine  Aged Out    Hib vaccine  Aged Out    Meningococcal (ACWY) vaccine  Aged Out      Depression Screening  PHQ Scores 6/24/2021 3/23/2021 12/14/2020 3/4/2019 9/24/2018 3/31/2017 6/15/2015   PHQ2 Score 0 0 0 0 0 0 0   PHQ9 Score 1 0 0 0 0 0 0     Interpretation of Total Score Depression Severity: 1-4 = Minimal depression, 5-9 = Mild depression, 10-14 = Moderate depression, 15-19 = Moderately severe depression, 20-27 = Severe depression      Review of Systems   Constitutional: Negative for activity change, appetite change, chills, diaphoresis, fatigue, fever and unexpected weight change. HENT: Negative. Negative for congestion, ear discharge, ear pain, sinus pressure and sinus pain.          Patient notes some chronic hair loss, unchanged since prior   Eyes: Negative. Respiratory: Negative for cough, shortness of breath and wheezing. Cardiovascular: Positive for leg swelling (chronic). Negative for chest pain and palpitations. Gastrointestinal: Negative. Negative for abdominal distention, abdominal pain, anal bleeding, blood in stool, constipation, diarrhea, nausea, rectal pain and vomiting. Endocrine: Negative for cold intolerance, heat intolerance and polydipsia. Genitourinary: Negative. Negative for difficulty urinating, dysuria and enuresis. Musculoskeletal: Positive for arthralgias (chronic of both hands, unchanged since prior). Negative for back pain, gait problem, joint swelling, myalgias, neck pain and neck stiffness. Skin: Positive for color change and rash. Negative for pallor and wound. Allergic/Immunologic: Negative for environmental allergies, food allergies and immunocompromised state. Neurological: Negative for facial asymmetry, light-headedness and headaches. Psychiatric/Behavioral: Negative. Negative for decreased concentration and sleep disturbance. The patient is not nervous/anxious. Objective    Physical Exam  Vitals reviewed. Exam conducted with a chaperone present. Constitutional:       General: She is not in acute distress. Appearance: She is well-developed. She is not diaphoretic. HENT:      Head: Normocephalic and atraumatic. Right Ear: Tympanic membrane, ear canal and external ear normal. There is no impacted cerumen. Left Ear: Tympanic membrane, ear canal and external ear normal. There is no impacted cerumen. Nose: Nose normal. No congestion or rhinorrhea. Mouth/Throat:      Mouth: Mucous membranes are moist.      Pharynx: Oropharynx is clear. No oropharyngeal exudate or posterior oropharyngeal erythema. Eyes:      General: No scleral icterus. Right eye: No discharge. Left eye: No discharge.       Extraocular (Site: Left Upper Arm, Position: Sitting)   Pulse 82   Resp 16   Wt 168 lb 9.4 oz (76.5 kg)   BMI 39.18 kg/m²   BP Readings from Last 3 Encounters:   12/21/21 131/85   09/23/21 126/79   06/24/21 136/80     Lab Results   Component Value Date    WBC 5.1 06/24/2021    HGB 11.9 06/24/2021    HCT 37.5 06/24/2021     06/24/2021    CHOL 150 06/24/2021    TRIG 143 06/24/2021    HDL 48 06/24/2021    ALT 21 06/24/2021    AST 25 06/24/2021     06/24/2021    K 4.0 06/24/2021     06/24/2021    CREATININE 0.70 06/24/2021    BUN 27 (H) 06/24/2021    CO2 28 06/24/2021    TSH 2.90 06/24/2021     Lab Results   Component Value Date    CALCIUM 10.2 06/24/2021     Lab Results   Component Value Date    LDLCHOLESTEROL 73 06/24/2021       CURRENT MEDS W/ ASSOC DIAG         Start Date End Date     acetaminophen (TYLENOL) 500 MG tablet  --  --     Associated Diagnoses:  --     aspirin 81 MG chewable tablet  --  --     Associated Diagnoses:  --     Calcium Carbonate (CALCIUM 600 PO)  --  --     Associated Diagnoses:  --     Cholecalciferol (VITAMIN D3) 2000 UNITS CAPS  --  --     Associated Diagnoses:  --     hydroCHLOROthiazide (HYDRODIURIL) 25 MG tablet  07/06/21  --     Take 1 tablet by mouth every other day     Associated Diagnoses:  --     levothyroxine (SYNTHROID) 88 MCG tablet  10/05/21  --     Take 1 tablet by mouth daily     Associated Diagnoses:  Hypothyroidism, unspecified type     lisinopril (PRINIVIL;ZESTRIL) 10 MG tablet  12/17/21  --     Take 1 tablet by mouth daily     Associated Diagnoses:  --     Multiple Vitamins-Minerals (PRESERVISION AREDS 2) CAPS  --  --     Associated Diagnoses:  --     traMADol (ULTRAM) 50 MG tablet  --  --     Associated Diagnoses:  --        Please note that this chart was generated using voice recognition Dragon dictation software.  Although every effort was made to ensure the accuracy of this automated transcription, some errors in transcription may have occurred.     Electronically signed by Antonio Henderson MD on 12/21/21 at 8:44 AM

## 2021-12-27 DIAGNOSIS — E03.9 HYPOTHYROIDISM, UNSPECIFIED TYPE: ICD-10-CM

## 2021-12-27 RX ORDER — HYDROCHLOROTHIAZIDE 25 MG/1
25 TABLET ORAL EVERY OTHER DAY
Qty: 90 TABLET | Refills: 0 | Status: SHIPPED | OUTPATIENT
Start: 2021-12-27 | End: 2022-03-22 | Stop reason: SDUPTHER

## 2021-12-27 RX ORDER — LEVOTHYROXINE SODIUM 88 UG/1
88 TABLET ORAL DAILY
Qty: 90 TABLET | Refills: 0 | Status: SHIPPED | OUTPATIENT
Start: 2021-12-27 | End: 2022-03-22 | Stop reason: SDUPTHER

## 2021-12-28 NOTE — TELEPHONE ENCOUNTER
Called patient, scheduled for follow up with Dr Dieudonne Callahan on January 6th per patient's request.

## 2022-01-06 ENCOUNTER — HOSPITAL ENCOUNTER (OUTPATIENT)
Age: 77
Discharge: HOME | End: 2022-01-06
Payer: COMMERCIAL

## 2022-01-06 ENCOUNTER — OFFICE VISIT (OUTPATIENT)
Dept: PRIMARY CARE CLINIC | Age: 77
End: 2022-01-06
Payer: COMMERCIAL

## 2022-01-06 VITALS
BODY MASS INDEX: 38.81 KG/M2 | DIASTOLIC BLOOD PRESSURE: 69 MMHG | WEIGHT: 167 LBS | SYSTOLIC BLOOD PRESSURE: 117 MMHG | HEART RATE: 81 BPM

## 2022-01-06 DIAGNOSIS — I10 PRIMARY HYPERTENSION: Primary | ICD-10-CM

## 2022-01-06 DIAGNOSIS — E78.5 HYPERLIPIDEMIA, UNSPECIFIED HYPERLIPIDEMIA TYPE: ICD-10-CM

## 2022-01-06 DIAGNOSIS — L65.9 HAIR LOSS: ICD-10-CM

## 2022-01-06 DIAGNOSIS — I10 PRIMARY HYPERTENSION: ICD-10-CM

## 2022-01-06 DIAGNOSIS — E03.9 HYPOTHYROIDISM, UNSPECIFIED TYPE: ICD-10-CM

## 2022-01-06 DIAGNOSIS — L03.317 CELLULITIS OF BUTTOCK: ICD-10-CM

## 2022-01-06 LAB
ABSOLUTE EOS #: 0.18 K/UL (ref 0–0.44)
ABSOLUTE IMMATURE GRANULOCYTE: <0.03 K/UL (ref 0–0.3)
ABSOLUTE LYMPH #: 1.42 K/UL (ref 1.1–3.7)
ABSOLUTE MONO #: 1.03 K/UL (ref 0.1–1.2)
ALBUMIN SERPL-MCNC: 4.3 G/DL (ref 3.5–5.2)
ALBUMIN/GLOBULIN RATIO: 1.6 (ref 1–2.5)
ALP BLD-CCNC: 82 U/L (ref 35–104)
ALT SERPL-CCNC: 25 U/L (ref 5–33)
ANION GAP SERPL CALCULATED.3IONS-SCNC: 11 MMOL/L (ref 9–17)
AST SERPL-CCNC: 28 U/L
BASOPHILS # BLD: 0 % (ref 0–2)
BASOPHILS ABSOLUTE: <0.03 K/UL (ref 0–0.2)
BILIRUB SERPL-MCNC: 0.52 MG/DL (ref 0.3–1.2)
BILIRUBIN DIRECT: <0.08 MG/DL
BILIRUBIN, INDIRECT: ABNORMAL MG/DL (ref 0–1)
BUN BLDV-MCNC: 29 MG/DL (ref 8–23)
CALCIUM SERPL-MCNC: 9.9 MG/DL (ref 8.6–10.4)
CHLORIDE BLD-SCNC: 100 MMOL/L (ref 98–107)
CHOLESTEROL/HDL RATIO: 3
CHOLESTEROL: 163 MG/DL
CO2: 26 MMOL/L (ref 20–31)
CREAT SERPL-MCNC: 0.76 MG/DL (ref 0.5–0.9)
DIFFERENTIAL TYPE: ABNORMAL
EOSINOPHILS RELATIVE PERCENT: 3 % (ref 1–4)
GFR AFRICAN AMERICAN: >60 ML/MIN
GFR NON-AFRICAN AMERICAN: >60 ML/MIN
GFR SERPL CREATININE-BSD FRML MDRD: ABNORMAL ML/MIN/{1.73_M2}
GFR SERPL CREATININE-BSD FRML MDRD: ABNORMAL ML/MIN/{1.73_M2}
GLUCOSE BLD-MCNC: 96 MG/DL (ref 70–99)
HCT VFR BLD CALC: 37.9 % (ref 36.3–47.1)
HDLC SERPL-MCNC: 54 MG/DL
HEMOGLOBIN: 12.2 G/DL (ref 11.9–15.1)
IMMATURE GRANULOCYTES: 0 %
LDL CHOLESTEROL: 80 MG/DL (ref 0–130)
LYMPHOCYTES # BLD: 21 % (ref 24–43)
MCH RBC QN AUTO: 32.9 PG (ref 25.2–33.5)
MCHC RBC AUTO-ENTMCNC: 32.2 G/DL (ref 28.4–34.8)
MCV RBC AUTO: 102.2 FL (ref 82.6–102.9)
MONOCYTES # BLD: 15 % (ref 3–12)
NRBC AUTOMATED: 0 PER 100 WBC
PDW BLD-RTO: 13.2 % (ref 11.8–14.4)
PLATELET # BLD: 193 K/UL (ref 138–453)
PLATELET ESTIMATE: ABNORMAL
PMV BLD AUTO: 10.1 FL (ref 8.1–13.5)
POTASSIUM SERPL-SCNC: 3.9 MMOL/L (ref 3.7–5.3)
RBC # BLD: 3.71 M/UL (ref 3.95–5.11)
RBC # BLD: ABNORMAL 10*6/UL
SEG NEUTROPHILS: 61 % (ref 36–65)
SEGMENTED NEUTROPHILS ABSOLUTE COUNT: 4.06 K/UL (ref 1.5–8.1)
SODIUM BLD-SCNC: 137 MMOL/L (ref 135–144)
TOTAL PROTEIN: 7 G/DL (ref 6.4–8.3)
TRIGL SERPL-MCNC: 146 MG/DL
TSH SERPL DL<=0.05 MIU/L-ACNC: 2.03 MIU/L (ref 0.3–5)
VLDLC SERPL CALC-MCNC: NORMAL MG/DL (ref 1–30)
WBC # BLD: 6.7 K/UL (ref 3.5–11.3)
WBC # BLD: ABNORMAL 10*3/UL

## 2022-01-06 PROCEDURE — 85025 COMPLETE CBC W/AUTO DIFF WBC: CPT

## 2022-01-06 PROCEDURE — 80053 COMPREHEN METABOLIC PANEL: CPT

## 2022-01-06 PROCEDURE — 4040F PNEUMOC VAC/ADMIN/RCVD: CPT | Performed by: STUDENT IN AN ORGANIZED HEALTH CARE EDUCATION/TRAINING PROGRAM

## 2022-01-06 PROCEDURE — G8484 FLU IMMUNIZE NO ADMIN: HCPCS | Performed by: STUDENT IN AN ORGANIZED HEALTH CARE EDUCATION/TRAINING PROGRAM

## 2022-01-06 PROCEDURE — G8417 CALC BMI ABV UP PARAM F/U: HCPCS | Performed by: STUDENT IN AN ORGANIZED HEALTH CARE EDUCATION/TRAINING PROGRAM

## 2022-01-06 PROCEDURE — 1090F PRES/ABSN URINE INCON ASSESS: CPT | Performed by: STUDENT IN AN ORGANIZED HEALTH CARE EDUCATION/TRAINING PROGRAM

## 2022-01-06 PROCEDURE — 1123F ACP DISCUSS/DSCN MKR DOCD: CPT | Performed by: STUDENT IN AN ORGANIZED HEALTH CARE EDUCATION/TRAINING PROGRAM

## 2022-01-06 PROCEDURE — 84443 ASSAY THYROID STIM HORMONE: CPT

## 2022-01-06 PROCEDURE — G8399 PT W/DXA RESULTS DOCUMENT: HCPCS | Performed by: STUDENT IN AN ORGANIZED HEALTH CARE EDUCATION/TRAINING PROGRAM

## 2022-01-06 PROCEDURE — 80061 LIPID PANEL: CPT

## 2022-01-06 PROCEDURE — 36415 COLL VENOUS BLD VENIPUNCTURE: CPT

## 2022-01-06 PROCEDURE — 99214 OFFICE O/P EST MOD 30 MIN: CPT | Performed by: STUDENT IN AN ORGANIZED HEALTH CARE EDUCATION/TRAINING PROGRAM

## 2022-01-06 PROCEDURE — 82248 BILIRUBIN DIRECT: CPT

## 2022-01-06 PROCEDURE — 99211 OFF/OP EST MAY X REQ PHY/QHP: CPT | Performed by: STUDENT IN AN ORGANIZED HEALTH CARE EDUCATION/TRAINING PROGRAM

## 2022-01-06 PROCEDURE — 1036F TOBACCO NON-USER: CPT | Performed by: STUDENT IN AN ORGANIZED HEALTH CARE EDUCATION/TRAINING PROGRAM

## 2022-01-06 PROCEDURE — G8427 DOCREV CUR MEDS BY ELIG CLIN: HCPCS | Performed by: STUDENT IN AN ORGANIZED HEALTH CARE EDUCATION/TRAINING PROGRAM

## 2022-01-06 ASSESSMENT — PATIENT HEALTH QUESTIONNAIRE - PHQ9
SUM OF ALL RESPONSES TO PHQ9 QUESTIONS 1 & 2: 0
SUM OF ALL RESPONSES TO PHQ QUESTIONS 1-9: 0
1. LITTLE INTEREST OR PLEASURE IN DOING THINGS: 0
SUM OF ALL RESPONSES TO PHQ QUESTIONS 1-9: 0
2. FEELING DOWN, DEPRESSED OR HOPELESS: 0

## 2022-01-06 ASSESSMENT — ENCOUNTER SYMPTOMS
VOMITING: 0
EYES NEGATIVE: 1
COUGH: 0
CONSTIPATION: 0
SINUS PAIN: 0
BACK PAIN: 0
DIARRHEA: 0
SINUS PRESSURE: 0
ABDOMINAL PAIN: 0
NAUSEA: 0
WHEEZING: 0
GASTROINTESTINAL NEGATIVE: 1
COLOR CHANGE: 1
SHORTNESS OF BREATH: 0
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
## 2022-03-21 RX ORDER — LISINOPRIL 10 MG/1
10 TABLET ORAL DAILY
Qty: 90 TABLET | Refills: 0 | Status: SHIPPED | OUTPATIENT
Start: 2022-03-21 | End: 2022-05-06 | Stop reason: DRUGHIGH

## 2022-03-22 ENCOUNTER — HOSPITAL ENCOUNTER (OUTPATIENT)
Dept: CT IMAGING | Age: 77
Discharge: HOME | End: 2022-03-24
Payer: COMMERCIAL

## 2022-03-22 ENCOUNTER — HOSPITAL ENCOUNTER (INPATIENT)
Age: 77
LOS: 2 days | Discharge: HOME HEALTH | DRG: 552 | End: 2022-03-24
Attending: EMERGENCY MEDICINE | Admitting: SURGERY
Payer: COMMERCIAL

## 2022-03-22 ENCOUNTER — APPOINTMENT (OUTPATIENT)
Dept: GENERAL RADIOLOGY | Age: 77
DRG: 552 | End: 2022-03-22
Payer: COMMERCIAL

## 2022-03-22 ENCOUNTER — APPOINTMENT (OUTPATIENT)
Dept: GENERAL RADIOLOGY | Age: 77
End: 2022-03-22
Payer: COMMERCIAL

## 2022-03-22 ENCOUNTER — APPOINTMENT (OUTPATIENT)
Dept: CT IMAGING | Age: 77
DRG: 552 | End: 2022-03-22
Payer: COMMERCIAL

## 2022-03-22 ENCOUNTER — OFFICE VISIT (OUTPATIENT)
Dept: PRIMARY CARE CLINIC | Age: 77
End: 2022-03-22
Payer: COMMERCIAL

## 2022-03-22 ENCOUNTER — APPOINTMENT (OUTPATIENT)
Dept: MRI IMAGING | Age: 77
DRG: 552 | End: 2022-03-22
Payer: COMMERCIAL

## 2022-03-22 ENCOUNTER — APPOINTMENT (OUTPATIENT)
Dept: CT IMAGING | Age: 77
End: 2022-03-22
Payer: COMMERCIAL

## 2022-03-22 ENCOUNTER — HOSPITAL ENCOUNTER (EMERGENCY)
Age: 77
Discharge: ANOTHER HOSPITAL | End: 2022-03-22
Attending: EMERGENCY MEDICINE
Payer: COMMERCIAL

## 2022-03-22 VITALS
BODY MASS INDEX: 38.81 KG/M2 | DIASTOLIC BLOOD PRESSURE: 84 MMHG | SYSTOLIC BLOOD PRESSURE: 146 MMHG | HEART RATE: 88 BPM | RESPIRATION RATE: 16 BRPM | WEIGHT: 167 LBS

## 2022-03-22 VITALS
BODY MASS INDEX: 38.81 KG/M2 | OXYGEN SATURATION: 94 % | DIASTOLIC BLOOD PRESSURE: 61 MMHG | HEART RATE: 79 BPM | RESPIRATION RATE: 16 BRPM | WEIGHT: 167 LBS | TEMPERATURE: 98.4 F | SYSTOLIC BLOOD PRESSURE: 152 MMHG

## 2022-03-22 DIAGNOSIS — I10 ESSENTIAL HYPERTENSION: ICD-10-CM

## 2022-03-22 DIAGNOSIS — S12.02XA CLOSED UNSTABLE BURST FRACTURE OF FIRST CERVICAL VERTEBRA, INITIAL ENCOUNTER (HCC): Primary | ICD-10-CM

## 2022-03-22 DIAGNOSIS — M54.2 ACUTE NECK PAIN: ICD-10-CM

## 2022-03-22 DIAGNOSIS — S12.100A CLOSED DISPLACED FRACTURE OF SECOND CERVICAL VERTEBRA, UNSPECIFIED FRACTURE MORPHOLOGY, INITIAL ENCOUNTER (HCC): ICD-10-CM

## 2022-03-22 DIAGNOSIS — S12.100A CLOSED ODONTOID FRACTURE, INITIAL ENCOUNTER (HCC): ICD-10-CM

## 2022-03-22 DIAGNOSIS — S12.001A CLOSED NONDISPLACED FRACTURE OF FIRST CERVICAL VERTEBRA, UNSPECIFIED FRACTURE MORPHOLOGY, INITIAL ENCOUNTER (HCC): ICD-10-CM
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
## 2022-03-22 DIAGNOSIS — M54.2 ACUTE NECK PAIN: Primary | ICD-10-CM

## 2022-03-22 DIAGNOSIS — E03.9 HYPOTHYROIDISM, UNSPECIFIED TYPE: ICD-10-CM

## 2022-03-22 PROBLEM — S12.01XK: Status: ACTIVE | Noted: 2022-03-22

## 2022-03-22 LAB
ABSOLUTE EOS #: 0.03 K/UL (ref 0–0.44)
ABSOLUTE IMMATURE GRANULOCYTE: 0.05 K/UL (ref 0–0.3)
ABSOLUTE LYMPH #: 0.65 K/UL (ref 1.1–3.7)
ABSOLUTE MONO #: 0.93 K/UL (ref 0.1–1.2)
ALBUMIN SERPL-MCNC: 4.5 G/DL (ref 3.5–5.2)
ALBUMIN/GLOBULIN RATIO: 1.7 (ref 1–2.5)
ALP BLD-CCNC: 86 U/L (ref 35–104)
ALT SERPL-CCNC: 19 U/L (ref 5–33)
ANION GAP SERPL CALCULATED.3IONS-SCNC: 8 MMOL/L (ref 9–17)
AST SERPL-CCNC: 23 U/L
BASOPHILS # BLD: 0 % (ref 0–2)
BASOPHILS ABSOLUTE: <0.03 K/UL (ref 0–0.2)
BILIRUB SERPL-MCNC: 0.68 MG/DL (ref 0.3–1.2)
BUN BLDV-MCNC: 25 MG/DL (ref 8–23)
BUN/CREAT BLD: 44 (ref 9–20)
CALCIUM SERPL-MCNC: 9.6 MG/DL (ref 8.6–10.4)
CHLORIDE BLD-SCNC: 101 MMOL/L (ref 98–107)
CO2: 29 MMOL/L (ref 20–31)
CREAT SERPL-MCNC: 0.57 MG/DL (ref 0.5–0.9)
EKG ATRIAL RATE: 71 BPM
EKG P AXIS: 21 DEGREES
EKG P-R INTERVAL: 192 MS
EKG Q-T INTERVAL: 432 MS
EKG QRS DURATION: 152 MS
EKG QTC CALCULATION (BAZETT): 469 MS
EKG R AXIS: -99 DEGREES
EKG T AXIS: 24 DEGREES
EKG VENTRICULAR RATE: 71 BPM
EOSINOPHILS RELATIVE PERCENT: 0 % (ref 1–4)
GFR AFRICAN AMERICAN: >60 ML/MIN
GFR NON-AFRICAN AMERICAN: >60 ML/MIN
GFR SERPL CREATININE-BSD FRML MDRD: ABNORMAL ML/MIN/{1.73_M2}
GFR SERPL CREATININE-BSD FRML MDRD: ABNORMAL ML/MIN/{1.73_M2}
GLUCOSE BLD-MCNC: 120 MG/DL (ref 70–99)
HCT VFR BLD CALC: 40.3 % (ref 36.3–47.1)
HEMOGLOBIN: 12.8 G/DL (ref 11.9–15.1)
IMMATURE GRANULOCYTES: 1 %
INR BLD: 1.1
LYMPHOCYTES # BLD: 7 % (ref 24–43)
MCH RBC QN AUTO: 32.6 PG (ref 25.2–33.5)
MCHC RBC AUTO-ENTMCNC: 31.8 G/DL (ref 28.4–34.8)
MCV RBC AUTO: 102.5 FL (ref 82.6–102.9)
MONOCYTES # BLD: 10 % (ref 3–12)
NRBC AUTOMATED: 0 PER 100 WBC
PARTIAL THROMBOPLASTIN TIME: 28.1 SEC (ref 26.8–34.8)
PDW BLD-RTO: 12.9 % (ref 11.8–14.4)
PLATELET # BLD: 187 K/UL (ref 138–453)
PMV BLD AUTO: 10.1 FL (ref 8.1–13.5)
POTASSIUM SERPL-SCNC: 3.6 MMOL/L (ref 3.7–5.3)
PROTHROMBIN TIME: 13.8 SEC (ref 11.5–14.2)
RBC # BLD: 3.93 M/UL (ref 3.95–5.11)
SARS-COV-2, RAPID: NOT DETECTED
SEG NEUTROPHILS: 82 % (ref 36–65)
SEGMENTED NEUTROPHILS ABSOLUTE COUNT: 7.99 K/UL (ref 1.5–8.1)
SODIUM BLD-SCNC: 138 MMOL/L (ref 135–144)
SPECIMEN DESCRIPTION: NORMAL
TOTAL PROTEIN: 7.1 G/DL (ref 6.4–8.3)
TROPONIN, HIGH SENSITIVITY: 18 NG/L (ref 0–14)
WBC # BLD: 9.7 K/UL (ref 3.5–11.3)

## 2022-03-22 PROCEDURE — 6360000004 HC RX CONTRAST MEDICATION: Performed by: STUDENT IN AN ORGANIZED HEALTH CARE EDUCATION/TRAINING PROGRAM

## 2022-03-22 PROCEDURE — 99285 EMERGENCY DEPT VISIT HI MDM: CPT

## 2022-03-22 PROCEDURE — 71045 X-RAY EXAM CHEST 1 VIEW: CPT

## 2022-03-22 PROCEDURE — 70498 CT ANGIOGRAPHY NECK: CPT

## 2022-03-22 PROCEDURE — 93005 ELECTROCARDIOGRAM TRACING: CPT | Performed by: STUDENT IN AN ORGANIZED HEALTH CARE EDUCATION/TRAINING PROGRAM

## 2022-03-22 PROCEDURE — 6360000002 HC RX W HCPCS: Performed by: STUDENT IN AN ORGANIZED HEALTH CARE EDUCATION/TRAINING PROGRAM

## 2022-03-22 PROCEDURE — 87635 SARS-COV-2 COVID-19 AMP PRB: CPT

## 2022-03-22 PROCEDURE — 85610 PROTHROMBIN TIME: CPT

## 2022-03-22 PROCEDURE — 1036F TOBACCO NON-USER: CPT | Performed by: FAMILY MEDICINE

## 2022-03-22 PROCEDURE — 80053 COMPREHEN METABOLIC PANEL: CPT

## 2022-03-22 PROCEDURE — 72040 X-RAY EXAM NECK SPINE 2-3 VW: CPT

## 2022-03-22 PROCEDURE — 99223 1ST HOSP IP/OBS HIGH 75: CPT | Performed by: PSYCHIATRY & NEUROLOGY

## 2022-03-22 PROCEDURE — 99283 EMERGENCY DEPT VISIT LOW MDM: CPT

## 2022-03-22 PROCEDURE — 4040F PNEUMOC VAC/ADMIN/RCVD: CPT | Performed by: FAMILY MEDICINE

## 2022-03-22 PROCEDURE — 70450 CT HEAD/BRAIN W/O DYE: CPT

## 2022-03-22 PROCEDURE — 2060000000 HC ICU INTERMEDIATE R&B

## 2022-03-22 PROCEDURE — G8484 FLU IMMUNIZE NO ADMIN: HCPCS | Performed by: FAMILY MEDICINE

## 2022-03-22 PROCEDURE — G8417 CALC BMI ABV UP PARAM F/U: HCPCS | Performed by: FAMILY MEDICINE

## 2022-03-22 PROCEDURE — 72141 MRI NECK SPINE W/O DYE: CPT

## 2022-03-22 PROCEDURE — 93005 ELECTROCARDIOGRAM TRACING: CPT | Performed by: EMERGENCY MEDICINE

## 2022-03-22 PROCEDURE — 96376 TX/PRO/DX INJ SAME DRUG ADON: CPT

## 2022-03-22 PROCEDURE — 99222 1ST HOSP IP/OBS MODERATE 55: CPT | Performed by: NEUROLOGICAL SURGERY

## 2022-03-22 PROCEDURE — 6370000000 HC RX 637 (ALT 250 FOR IP): Performed by: STUDENT IN AN ORGANIZED HEALTH CARE EDUCATION/TRAINING PROGRAM

## 2022-03-22 PROCEDURE — 3209999900 CT LUMBAR SPINE TRAUMA RECONSTRUCTION

## 2022-03-22 PROCEDURE — 99214 OFFICE O/P EST MOD 30 MIN: CPT | Performed by: FAMILY MEDICINE

## 2022-03-22 PROCEDURE — G8427 DOCREV CUR MEDS BY ELIG CLIN: HCPCS | Performed by: FAMILY MEDICINE

## 2022-03-22 PROCEDURE — 1123F ACP DISCUSS/DSCN MKR DOCD: CPT | Performed by: FAMILY MEDICINE

## 2022-03-22 PROCEDURE — 72125 CT NECK SPINE W/O DYE: CPT

## 2022-03-22 PROCEDURE — 93010 ELECTROCARDIOGRAM REPORT: CPT | Performed by: INTERNAL MEDICINE

## 2022-03-22 PROCEDURE — 85025 COMPLETE CBC W/AUTO DIFF WBC: CPT

## 2022-03-22 PROCEDURE — 6360000002 HC RX W HCPCS: Performed by: EMERGENCY MEDICINE

## 2022-03-22 PROCEDURE — 96375 TX/PRO/DX INJ NEW DRUG ADDON: CPT

## 2022-03-22 PROCEDURE — 2580000003 HC RX 258: Performed by: STUDENT IN AN ORGANIZED HEALTH CARE EDUCATION/TRAINING PROGRAM

## 2022-03-22 PROCEDURE — G8399 PT W/DXA RESULTS DOCUMENT: HCPCS | Performed by: FAMILY MEDICINE

## 2022-03-22 PROCEDURE — 71260 CT THORAX DX C+: CPT

## 2022-03-22 PROCEDURE — 3209999900 CT THORACIC SPINE TRAUMA RECONSTRUCTION

## 2022-03-22 PROCEDURE — 1090F PRES/ABSN URINE INCON ASSESS: CPT | Performed by: FAMILY MEDICINE

## 2022-03-22 PROCEDURE — 96374 THER/PROPH/DIAG INJ IV PUSH: CPT

## 2022-03-22 PROCEDURE — 84484 ASSAY OF TROPONIN QUANT: CPT

## 2022-03-22 PROCEDURE — 36415 COLL VENOUS BLD VENIPUNCTURE: CPT

## 2022-03-22 PROCEDURE — 85730 THROMBOPLASTIN TIME PARTIAL: CPT

## 2022-03-22 RX ORDER — ONDANSETRON 4 MG/1
4 TABLET, ORALLY DISINTEGRATING ORAL EVERY 8 HOURS PRN
Status: DISCONTINUED | OUTPATIENT
Start: 2022-03-22 | End: 2022-03-24 | Stop reason: HOSPADM

## 2022-03-22 RX ORDER — HYDROCHLOROTHIAZIDE 25 MG/1
25 TABLET ORAL EVERY OTHER DAY
Qty: 90 TABLET | Refills: 0 | Status: SHIPPED | OUTPATIENT
Start: 2022-03-22 | End: 2022-09-09 | Stop reason: SDUPTHER

## 2022-03-22 RX ORDER — GABAPENTIN 100 MG/1
100 CAPSULE ORAL EVERY 8 HOURS
Status: DISCONTINUED | OUTPATIENT
Start: 2022-03-22 | End: 2022-03-24 | Stop reason: HOSPADM

## 2022-03-22 RX ORDER — LEVOTHYROXINE SODIUM 88 UG/1
88 TABLET ORAL DAILY
Qty: 90 TABLET | Refills: 0 | Status: SHIPPED | OUTPATIENT
Start: 2022-03-22 | End: 2022-07-06 | Stop reason: SDUPTHER

## 2022-03-22 RX ORDER — LEVOTHYROXINE SODIUM 88 UG/1
88 TABLET ORAL DAILY
Status: DISCONTINUED | OUTPATIENT
Start: 2022-03-22 | End: 2022-03-24 | Stop reason: HOSPADM

## 2022-03-22 RX ORDER — ASPIRIN 81 MG/1
81 TABLET, CHEWABLE ORAL DAILY
Status: DISCONTINUED | OUTPATIENT
Start: 2022-03-22 | End: 2022-03-22

## 2022-03-22 RX ORDER — ONDANSETRON 2 MG/ML
4 INJECTION INTRAMUSCULAR; INTRAVENOUS EVERY 6 HOURS PRN
Status: DISCONTINUED | OUTPATIENT
Start: 2022-03-22 | End: 2022-03-24 | Stop reason: HOSPADM

## 2022-03-22 RX ORDER — DEXTROSE AND SODIUM CHLORIDE 5; .45 G/100ML; G/100ML
INJECTION, SOLUTION INTRAVENOUS CONTINUOUS
Status: DISCONTINUED | OUTPATIENT
Start: 2022-03-23 | End: 2022-03-23

## 2022-03-22 RX ORDER — POLYETHYLENE GLYCOL 3350 17 G/17G
17 POWDER, FOR SOLUTION ORAL DAILY
Status: DISCONTINUED | OUTPATIENT
Start: 2022-03-22 | End: 2022-03-24 | Stop reason: HOSPADM

## 2022-03-22 RX ORDER — SODIUM PHOSPHATE, DIBASIC AND SODIUM PHOSPHATE, MONOBASIC 7; 19 G/133ML; G/133ML
1 ENEMA RECTAL DAILY PRN
Status: CANCELLED | OUTPATIENT
Start: 2022-03-22

## 2022-03-22 RX ORDER — HYDROCHLOROTHIAZIDE 25 MG/1
25 TABLET ORAL EVERY OTHER DAY
Status: DISCONTINUED | OUTPATIENT
Start: 2022-03-22 | End: 2022-03-24 | Stop reason: HOSPADM

## 2022-03-22 RX ORDER — SODIUM CHLORIDE 0.9 % (FLUSH) 0.9 %
5-40 SYRINGE (ML) INJECTION EVERY 12 HOURS SCHEDULED
Status: DISCONTINUED | OUTPATIENT
Start: 2022-03-22 | End: 2022-03-24 | Stop reason: HOSPADM

## 2022-03-22 RX ORDER — ACETAMINOPHEN 500 MG
1000 TABLET ORAL EVERY 8 HOURS SCHEDULED
Status: DISCONTINUED | OUTPATIENT
Start: 2022-03-22 | End: 2022-03-24 | Stop reason: HOSPADM

## 2022-03-22 RX ORDER — FENTANYL CITRATE 50 UG/ML
25 INJECTION, SOLUTION INTRAMUSCULAR; INTRAVENOUS ONCE
Status: COMPLETED | OUTPATIENT
Start: 2022-03-22 | End: 2022-03-22

## 2022-03-22 RX ORDER — SODIUM CHLORIDE 0.9 % (FLUSH) 0.9 %
5-40 SYRINGE (ML) INJECTION PRN
Status: DISCONTINUED | OUTPATIENT
Start: 2022-03-22 | End: 2022-03-24 | Stop reason: HOSPADM

## 2022-03-22 RX ORDER — ACETAMINOPHEN 160 MG
2000 TABLET,DISINTEGRATING ORAL DAILY
Status: CANCELLED | OUTPATIENT
Start: 2022-03-22

## 2022-03-22 RX ORDER — FENTANYL CITRATE 50 UG/ML
50 INJECTION, SOLUTION INTRAMUSCULAR; INTRAVENOUS ONCE
Status: COMPLETED | OUTPATIENT
Start: 2022-03-22 | End: 2022-03-22

## 2022-03-22 RX ORDER — BISACODYL 10 MG
10 SUPPOSITORY, RECTAL RECTAL DAILY
Status: CANCELLED | OUTPATIENT
Start: 2022-03-22

## 2022-03-22 RX ORDER — ANTIOX #8/OM3/DHA/EPA/LUT/ZEAX 250-2.5 MG
2 CAPSULE ORAL DAILY
Status: CANCELLED | OUTPATIENT
Start: 2022-03-22

## 2022-03-22 RX ORDER — LORAZEPAM 2 MG/ML
0.5 INJECTION INTRAMUSCULAR ONCE
Status: COMPLETED | OUTPATIENT
Start: 2022-03-22 | End: 2022-03-22

## 2022-03-22 RX ORDER — SODIUM CHLORIDE 9 MG/ML
25 INJECTION, SOLUTION INTRAVENOUS PRN
Status: DISCONTINUED | OUTPATIENT
Start: 2022-03-22 | End: 2022-03-24 | Stop reason: HOSPADM

## 2022-03-22 RX ORDER — OXYCODONE HYDROCHLORIDE 5 MG/1
10 TABLET ORAL EVERY 4 HOURS PRN
Status: CANCELLED | OUTPATIENT
Start: 2022-03-22

## 2022-03-22 RX ORDER — OXYCODONE HYDROCHLORIDE 5 MG/1
5 TABLET ORAL EVERY 4 HOURS PRN
Status: CANCELLED | OUTPATIENT
Start: 2022-03-22

## 2022-03-22 RX ORDER — LISINOPRIL 10 MG/1
10 TABLET ORAL DAILY
Status: DISCONTINUED | OUTPATIENT
Start: 2022-03-22 | End: 2022-03-24 | Stop reason: HOSPADM

## 2022-03-22 RX ORDER — ONDANSETRON 2 MG/ML
4 INJECTION INTRAMUSCULAR; INTRAVENOUS ONCE
Status: COMPLETED | OUTPATIENT
Start: 2022-03-22 | End: 2022-03-22

## 2022-03-22 RX ADMIN — FENTANYL CITRATE 50 MCG: 50 INJECTION, SOLUTION INTRAMUSCULAR; INTRAVENOUS at 16:15

## 2022-03-22 RX ADMIN — GABAPENTIN 100 MG: 100 CAPSULE ORAL at 21:27

## 2022-03-22 RX ADMIN — FENTANYL CITRATE 25 MCG: 50 INJECTION, SOLUTION INTRAMUSCULAR; INTRAVENOUS at 14:39

## 2022-03-22 RX ADMIN — LISINOPRIL 10 MG: 10 TABLET ORAL at 21:27

## 2022-03-22 RX ADMIN — HYDROCHLOROTHIAZIDE 25 MG: 25 TABLET ORAL at 21:27

## 2022-03-22 RX ADMIN — IOPAMIDOL 150 ML: 755 INJECTION, SOLUTION INTRAVENOUS at 17:06

## 2022-03-22 RX ADMIN — SODIUM CHLORIDE, PRESERVATIVE FREE 10 ML: 5 INJECTION INTRAVENOUS at 21:32

## 2022-03-22 RX ADMIN — Medication 600 MG: at 21:27

## 2022-03-22 RX ADMIN — LORAZEPAM 0.5 MG: 2 INJECTION INTRAMUSCULAR; INTRAVENOUS at 11:59

## 2022-03-22 RX ADMIN — FENTANYL CITRATE 25 MCG: 50 INJECTION, SOLUTION INTRAMUSCULAR; INTRAVENOUS at 11:58

## 2022-03-22 RX ADMIN — ACETAMINOPHEN 1000 MG: 500 TABLET ORAL at 21:27

## 2022-03-22 RX ADMIN — ONDANSETRON 4 MG: 2 INJECTION INTRAMUSCULAR; INTRAVENOUS at 14:30

## 2022-03-22 ASSESSMENT — PAIN DESCRIPTION - FREQUENCY: FREQUENCY: CONTINUOUS

## 2022-03-22 ASSESSMENT — PAIN SCALES - GENERAL
PAINLEVEL_OUTOF10: 10
PAINLEVEL_OUTOF10: 10
PAINLEVEL_OUTOF10: 7
PAINLEVEL_OUTOF10: 9
PAINLEVEL_OUTOF10: 10
PAINLEVEL_OUTOF10: 10

## 2022-03-22 ASSESSMENT — ENCOUNTER SYMPTOMS
VOMITING: 0
SORE THROAT: 0
ALLERGIC/IMMUNOLOGIC COMMENTS: NKA
ABDOMINAL PAIN: 0
RHINORRHEA: 0
NAUSEA: 0
FACIAL SWELLING: 0
BLOOD IN STOOL: 0
BACK PAIN: 0
SHORTNESS OF BREATH: 0
CONSTIPATION: 0
DIARRHEA: 0
COUGH: 0

## 2022-03-22 ASSESSMENT — PAIN DESCRIPTION - LOCATION
LOCATION: NECK;HEAD
LOCATION: NECK
LOCATION: HEAD;NECK

## 2022-03-22 ASSESSMENT — PAIN DESCRIPTION - PAIN TYPE
TYPE: ACUTE PAIN

## 2022-03-22 ASSESSMENT — PAIN DESCRIPTION - PROGRESSION: CLINICAL_PROGRESSION: GRADUALLY IMPROVING

## 2022-03-22 ASSESSMENT — PAIN - FUNCTIONAL ASSESSMENT: PAIN_FUNCTIONAL_ASSESSMENT: 0-10

## 2022-03-22 ASSESSMENT — PAIN DESCRIPTION - DESCRIPTORS: DESCRIPTORS: ACHING

## 2022-03-22 NOTE — PROGRESS NOTES
Trauma Tertiary Survey    Admit Date: 3/22/2022  Hospital day 0    [facility]       Past Medical History:   Diagnosis Date    Arthritis     osteo    Edema     Hyperlipidemia     Hypertension     Hypothyroidism     Kidney stones     Osteoarthritis     Thyroid disease     Unspecified transient cerebral ischemia        Scheduled Meds:   aspirin  81 mg Oral Daily     Continuous Infusions:  PRN Meds:ondansetron **OR** ondansetron    Subjective:     Patient is a 53-year-old female who presented after falling from standing height. Patient sustained a C1 burst fracture and odontoid fracture. Patient complains of however is not in acute distress and is otherwise comfortable. Patient is hemodynamically stable. Objective:     Patient Vitals for the past 8 hrs:   BP Temp Temp src Pulse Resp SpO2 Height Weight   03/22/22 1747 134/89 -- -- 82 16 99 % -- --   03/22/22 1647 127/87 -- -- 77 14 96 % -- --   03/22/22 1633 137/88 -- -- 74 14 96 % -- --   03/22/22 1608 -- 98.7 °F (37.1 °C) Oral -- -- -- -- --   03/22/22 1601 (!) 152/95 -- -- 81 16 97 % -- --   03/22/22 1557 -- -- -- -- -- -- 4' 7\" (1.397 m) 167 lb (75.8 kg)       No intake/output data recorded. No intake/output data recorded. Radiology:XR CERVICAL SPINE (2-3 VIEWS)    Result Date: 3/22/2022  Nonvisualization of the known C1 and odontoid fractures. CT Head WO Contrast    Result Date: 3/22/2022  06/17/2020     CT CERVICAL SPINE WO CONTRAST    Result Date: 3/22/2022  1. Nondisplaced Ric burst fracture of the C1 atlas, involving midline anterior and bilateral posterior arches. Consider MRI to assess for soft tissue injury. 2.  Type 2 odontoid fracture with mild apex ventral angulation. 3.  Mild prevertebral soft tissue swelling at the level of the odontoid process. 4.  Mild anterolisthesis at C3-C4, C4-C5, and C7-T1, likely degenerative. 5.  Severe multilevel degenerative changes with at least moderate spinal canal narrowing at C6-C7.  Critical results were called by Dr. Parker Wheatley to Dr. Jacques Herrera On 3/22/2022 at 10:11. MRI CERVICAL SPINE WO CONTRAST    Result Date: 3/23/2022  Type 2 odontoid fractureinvolving the C1 better seen on recent CT. Negative for cord compression or contusion Mild cord flattening due to multilevel severe degenerative changes. Prevertebral and posterior paraspinal edema without definite areas of high-grade ligamentous disruption or injury identified. XR CHEST PORTABLE    Result Date: 3/22/2022  No acute cardiopulmonary process     CTA NECK W CONTRAST    Result Date: 3/22/2022  1. Intimal irregularity in the right vertebral artery. This could be atherosclerotic in nature, but grade 1 blunt cerebrovascular injury cannot be excluded. No significant stenosis. 2. No significant stenosis or occlusion of the cervical vasculature. CT CHEST ABDOMEN PELVIS W CONTRAST    Result Date: 3/22/2022  1. 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 Coronary artery disease. No effusion or extrapleural air. Extensive at least moderate spondylosis thoracic spine. 2.  CT abdomen and pelvis: No bowel obstruction. Patient has a dilated gallbladder with gallstone. Periumbilical hernia containing fat and bowel without strangulation. Abnormal appearing uterus, enlarged for age with thickened endometrium. Left inguinal fat containing hernia without strangulation. Severe scoliotic curvature in the lumbar spine with multilevel advanced degenerative change, neural foraminal and canal stenosis fully described above without evidence of acute fracture. CT LUMBAR SPINE TRAUMA RECONSTRUCTION    Result Date: 3/22/2022  1. No acute lumbar fracture evident. 2. Chronic grade 2 anterolisthesis L4 on L5 and grade 1 anterolisthesis L5 on S1. 3. Leftward listhesis L4 on L5. 4. Advanced degenerative changes appear similar to prior study, seen throughout the lumbar spine. 5. Lower lumbar levorotatory scoliosis.  If pain persists or worsens, then additional characterization with MRI lumbar spine may be indicated. CT THORACIC SPINE TRAUMA RECONSTRUCTION    Result Date: 3/22/2022  No evidence of an acute fracture or traumatic malalignment involving the thoracic spine. PHYSICAL EXAM:   GCS:  4 - Opens eyes on own   6 - Follows simple motor commands  5 - Alert and oriented    Pupil size:  Left 2 mm Right 2 mm  Pupil reaction: Yes  Wiggles fingers: Left Yes Right Yes  Hand grasp:   Left normal   Right normal  Wiggles toes: Left Yes    Right Yes  Plantar flexion: Left normal  Right normal    General: Patient appears appropriate for documented age   [de-identified]: Patient is in c-collar  Cardiovascular: Regular rate and rhythm. Pulses strong in bilateral upper and lower extremities. Capillary refill less than 2 seconds. Pulmonary: Equal chest rise bilaterally, no acute respiratory distress  Abdominal: Soft, nondistended, nontender. No palpable masses. Neuro: Reacts appropriately to external stimuli, moves all 4 extremities spontaneously. Skin: No rashes lesions abrasions or bruises.       Spine:     Spine Tenderness ROM   Cervical 5 /10 N/A: C-collar   Thoracic 0 /10 Normal   Lumbar 0 /10 Normal     Musculoskeletal    Joint Tenderness Swelling ROM   Right shoulder absent absent normal   Left shoulder absent absent normal   Right elbow absent absent normal   Left elbow absent absent normal   Right wrist absent absent normal   Left wrist absent absent normal   Right hand grasp absent absent normal   Left hand grasp absent absent normal   Right hip absent absent normal   Left hip absent absent normal   Right knee absent absent normal   Left knee absent absent normal   Right ankle absent absent normal   Left ankle absent absent normal   Right foot absent absent normal   Left foot absent absent normal           CONSULTS:   Neurosurgery    INJURIES: C1 burst fracture, odontoid fracture      Patient Active Problem List   Diagnosis    Transient cerebral ischemia    Essential hypertension    Hyperlipidemia    Primary osteoarthritis involving multiple joints    Hypothyroidism    Primary osteoarthritis of right shoulder    Morbidly obese (HCC)    Skin rash    Cellulitis of buttock    Hair loss    Primary hypertension    Ric fracture with nonunion, subsequent encounter         Assessment/Plan:     Patient is a 56-year-old female who fell from standing pending additional imaging. Patient will be admitted to the floor, possible OR in the morning with neurosurgery pending further recommendations. Tertiary examination complete, no additional images required at this time.

## 2022-03-22 NOTE — ED PROVIDER NOTES
[facility]  ED  Emergency Department Encounter  EmergencyMedicine Resident     Pt Name:Vanessa Chery  MRN: [id number]  [place] 1945  Date of evaluation: 3/22/22  PCP:  Bharathi Youngblood MD    This patient was evaluated in the Emergency Department for symptoms described in the history of present illness. The patient was evaluated in the context of the global COVID-19 pandemic, which necessitated consideration that the patient might be at risk for infection with the SARS-CoV-2 virus that causes COVID-19. Institutional protocols and algorithms that pertain to the evaluation of patients at risk for COVID-19 are in a state of rapid change based on information released by regulatory bodies including the CDC and federal and state organizations. These policies and algorithms were followed during the patient's care in the ED. CHIEF COMPLAINT       Chief Complaint   Patient presents with    Neck Pain     Mechancial fall yesterday, has C1 C2 fracture. In [place]       HISTORY OF PRESENT ILLNESS  (Location/Symptom, Timing/Onset, Context/Setting, Quality, Duration, Modifying Factors, Severity.)      Wade Flannery is a 64 y.o. female who presents with neck pain. Patient had a mechanical fall from standing last night, reports that she was cleaning, fell, hit her head, developed neck pain, presented to the emergency department today with neck pain, found to have C1 and C2 fractures, transported to the emergency department for further evaluation. Patient is complaining neck pain, denies any other complaints at this time. Patient denies headache, vision changes, weakness, numbness, chest pain, shortness breath, abdominal pain, nausea, vomiting, diarrhea.     PAST MEDICAL / SURGICAL / SOCIAL / FAMILY HISTORY      has a past medical history of Arthritis, Edema, Hyperlipidemia, Hypertension, Hypothyroidism, Kidney stones, Osteoarthritis, Thyroid disease, and Unspecified transient cerebral ischemia. has a past surgical history that includes Lithotripsy; Knee arthroscopy (Left);  section; Tonsillectomy; Appendectomy; Hammer toe surgery; Cataract removal; Corneal transplant; joint replacement (Left, 10/15/14); Finger surgery (Left, 2018); Shoulder Arthroplasty (Right, 2019); and REPLACEMENT SHOULDER TOTAL (Right, 2019). Social History     Socioeconomic History    Marital status:      Spouse name: Not on file    Number of children: Not on file    Years of education: Not on file    Highest education level: Not on file   Occupational History    Not on file   Tobacco Use    Smoking status: Never Smoker    Smokeless tobacco: Never Used   Substance and Sexual Activity    Alcohol use: No    Drug use: Never    Sexual activity: Not on file   Other Topics Concern    Not on file   Social History Narrative    Not on file     Social Determinants of Health     Financial Resource Strain: Low Risk     Difficulty of Paying Living Expenses: Not hard at all   Food Insecurity: No Food Insecurity    Worried About Running Out of Food in the Last Year: Never true    Kevin of Food in the Last Year: Never true   Transportation Needs: No Transportation Needs    Lack of Transportation (Medical): No    Lack of Transportation (Non-Medical):  No   Physical Activity:     Days of Exercise per Week: Not on file    Minutes of Exercise per Session: Not on file   Stress:     Feeling of Stress : Not on file   Social Connections:     Frequency of Communication with Friends and Family: Not on file    Frequency of Social Gatherings with Friends and Family: Not on file    Attends Anabaptist Services: Not on file    Active Member of Clubs or Organizations: Not on file    Attends Club or Organization Meetings: Not on file    Marital Status: Not on file   Intimate Partner Violence:     Fear of Current or Ex-Partner: Not on file    Emotionally Abused: Not on file    Physically Abused: Not on file    Sexually Abused: Not on file   Housing Stability:     Unable to Pay for Housing in the Last Year: Not on file    Number of Places Lived in the Last Year: Not on file    Unstable Housing in the Last Year: Not on file       Family History   Problem Relation Age of Onset    Diabetes Mother     High Blood Pressure Mother     Diabetes Father     Cancer Father         lung    High Blood Pressure Father     Diabetes Sister     Heart Disease Sister        Allergies:  Patient has no known allergies. Home Medications:  Prior to Admission medications    Medication Sig Start Date End Date Taking? Authorizing Provider   lisinopril (PRINIVIL;ZESTRIL) 10 MG tablet Take 1 tablet by mouth daily 3/21/22  Yes Gage Ascencio MD   hydroCHLOROthiazide (HYDRODIURIL) 25 MG tablet Take 1 tablet by mouth every other day 3/22/22   Gage Ascencio MD   levothyroxine (SYNTHROID) 88 MCG tablet Take 1 tablet by mouth daily 3/22/22   Gage Ascencio MD   aspirin 81 MG chewable tablet Take 81 mg by mouth daily    Historical Provider, MD   Calcium Carbonate (CALCIUM 600 PO) Take by mouth daily    Historical Provider, MD   Multiple Vitamins-Minerals (PRESERVISION AREDS 2) CAPS Take by mouth daily    Historical Provider, MD   acetaminophen (TYLENOL) 500 MG tablet Take 500 mg by mouth every 6 hours as needed for Pain    Historical Provider, MD   Cholecalciferol (VITAMIN D3) 2000 UNITS CAPS Take 1,000 Int'l Units/day by mouth. Historical Provider, MD       REVIEW OF SYSTEMS    (2-9 systems for level 4, 10 or more for level 5)      Review of Systems   Constitutional: Negative for chills, diaphoresis, fatigue and fever. HENT: Negative for congestion, rhinorrhea and sore throat. Eyes: Negative for visual disturbance. Respiratory: Negative for cough and shortness of breath. Cardiovascular: Negative for chest pain.    Gastrointestinal: Negative for abdominal pain, blood in stool, constipation, diarrhea, nausea and vomiting. Genitourinary: Negative for dysuria and hematuria. Musculoskeletal: Positive for neck pain. Negative for arthralgias, back pain and myalgias. Skin: Negative for pallor and rash. Neurological: Negative for dizziness, syncope, facial asymmetry, speech difficulty, weakness, light-headedness, numbness and headaches. Psychiatric/Behavioral: Negative for confusion. PHYSICAL EXAM   (up to 7 for level 4, 8 or more for level 5)      INITIAL VITALS:   BP (!) 130/90   Pulse 78   Temp 98.7 °F (37.1 °C) (Oral)   Resp 16   Ht 4' 7\" (1.397 m)   Wt 167 lb (75.8 kg)   SpO2 98%   BMI 38.81 kg/m²     Physical Exam  Constitutional:       General: She is not in acute distress. Appearance: Normal appearance. She is well-developed. She is not ill-appearing, toxic-appearing or diaphoretic. HENT:      Head: Normocephalic and atraumatic. Right Ear: External ear normal.      Left Ear: External ear normal.   Eyes:      General:         Right eye: No discharge. Left eye: No discharge. Extraocular Movements: Extraocular movements intact. Pupils: Pupils are equal, round, and reactive to light. Neck:      Vascular: No JVD. Trachea: No tracheal deviation. Comments: Cervical collar in place for unstable cervical spine injury  Cardiovascular:      Rate and Rhythm: Normal rate and regular rhythm. Pulses: Normal pulses. Heart sounds: Normal heart sounds. No murmur heard. No friction rub. No gallop. Pulmonary:      Effort: Pulmonary effort is normal. No respiratory distress. Breath sounds: Normal breath sounds. No stridor. No wheezing, rhonchi or rales. Chest:      Chest wall: No tenderness. Abdominal:      General: There is no distension. Palpations: Abdomen is soft. There is no mass. Tenderness: There is no abdominal tenderness. There is no right CVA tenderness, left CVA tenderness or guarding.    Musculoskeletal: General: Normal range of motion. Skin:     General: Skin is warm. Capillary Refill: Capillary refill takes less than 2 seconds. Neurological:      General: No focal deficit present. Mental Status: She is alert and oriented to person, place, and time. Cranial Nerves: No cranial nerve deficit. Sensory: No sensory deficit. Motor: No weakness. Psychiatric:         Mood and Affect: Mood normal.         Behavior: Behavior normal.         DIFFERENTIAL  DIAGNOSIS     PLAN (LABS / IMAGING / EKG):  Orders Placed This Encounter   Procedures    Neck Brace [place]    COVID-19, Rapid    CTA NECK W CONTRAST    CT CHEST ABDOMEN PELVIS W CONTRAST    CT THORACIC SPINE TRAUMA RECONSTRUCTION    CT LUMBAR SPINE TRAUMA RECONSTRUCTION    MRI CERVICAL SPINE WO CONTRAST    XR CERVICAL SPINE (2-3 VIEWS)    XR CERVICAL SPINE (2-3 VIEWS)    Basic Metabolic Panel w/ Reflex to MG    CBC with Auto Differential    Troponin    Diet NPO Exceptions are: Sips of Water with Meds    ADULT DIET;  Regular    Vital signs per unit routine    Notify patient's primary care physician of admission    Place intermittent pneumatic compression device    Strict Bedrest    Strict Bedrest    Full Code    Inpatient consult to Trauma Surgery    Inpatient consult to Neurosurgery    Inpatient Consult to Endovascular Neurosurgery    OT eval and treat    PT evaluation and treat    Initiate Oxygen Therapy Protocol    Speech language pathology evaluation    EKG 12 Lead    ADMIT TO INPATIENT    ADMIT TO INPATIENT       MEDICATIONS ORDERED:  Orders Placed This Encounter   Medications    fentaNYL (SUBLIMAZE) injection 50 mcg    iopamidol (ISOVUE-370) 76 % injection 150 mL    sodium chloride flush 0.9 % injection 5-40 mL    sodium chloride flush 0.9 % injection 5-40 mL    0.9 % sodium chloride infusion    OR Linked Order Group     ondansetron (ZOFRAN-ODT) disintegrating tablet 4 mg     ondansetron (ZOFRAN) injection 4 mg    polyethylene glycol (GLYCOLAX) packet 17 g    acetaminophen (TYLENOL) tablet 1,000 mg    gabapentin (NEURONTIN) capsule 100 mg    OR Linked Order Group     ondansetron (ZOFRAN-ODT) disintegrating tablet 4 mg     ondansetron (ZOFRAN) injection 4 mg    DISCONTD: aspirin chewable tablet 81 mg    calcium carbonate tablet 600 mg    hydroCHLOROthiazide (HYDRODIURIL) tablet 25 mg    levothyroxine (SYNTHROID) tablet 88 mcg    lisinopril (PRINIVIL;ZESTRIL) tablet 10 mg    dextrose 5 % and 0.45 % sodium chloride infusion       DDX:     DIAGNOSTIC RESULTS / EMERGENCY DEPARTMENT COURSE / MDM   LAB RESULTS:  Results for orders placed or performed during the hospital encounter of 03/22/22   COVID-19, Rapid    Specimen: Nasopharyngeal Swab   Result Value Ref Range    Specimen Description . NASOPHARYNGEAL SWAB     SARS-CoV-2, Rapid Not Detected Not Detected   Troponin   Result Value Ref Range    Troponin, High Sensitivity 18 (H) 0 - 14 ng/L       IMPRESSION: 66-year-old female with mechanical fall from standing, presented to outside facility with neck pain, found to have C1 burst fracture and type II odontoid fracture, transported to emergency department for neurosurgical and trauma evaluation. Will treat pain, reevaluate. Will consult with neurosurgery and trauma. RADIOLOGY:  XR CERVICAL SPINE (2-3 VIEWS)    Result Date: 3/22/2022  EXAMINATION: 4 XRAY VIEWS OF THE CERVICAL SPINE 3/22/2022 9:49 pm COMPARISON: CT cervical spine 03/22/2022. HISTORY: ORDERING SYSTEM PROVIDED HISTORY: standing or 90 deg seated lateral and open mouth odontoid TECHNOLOGIST PROVIDED HISTORY: standing or 90 deg seated lateral and open mouth odontoid FINDINGS: Evaluation limited by osseous demineralization. Grade 1 anterolisthesis of C3 on C4. The vertebral body heights are grossly preserved. The known odontoid and C1 fractures are not well visualized. The atlantoaxial junction is grossly intact.   The paravertebral soft tissues are unremarkable. Nonvisualization of the known C1 and odontoid fractures. CT Head WO Contrast    Result Date: 3/22/2022  EXAMINATION: CT OF THE HEAD WITHOUT CONTRAST  3/22/2022 11:15 am TECHNIQUE: CT of the head was performed without the administration of intravenous contrast. Dose modulation, iterative reconstruction, and/or weight based adjustment of the mA/kV was utilized to reduce the radiation dose to as low as reasonably achievable. COMPARISON: None. HISTORY: ORDERING SYSTEM PROVIDED HISTORY: fell and hit head recently TECHNOLOGIST PROVIDED HISTORY: fell and hit head recently Decision Support Exception - unselect if not a suspected or confirmed emergency medical condition->Emergency Medical Condition (MA) FINDINGS: BRAIN/VENTRICLES: There is no acute intracranial hemorrhage, mass effect or midline shift. No abnormal extra-axial fluid collection. The gray-white differentiation is maintained without evidence of an acute infarct. Focal old lacunar infarct is noted in the left caudate nucleus. There is no evidence of hydrocephalus. ORBITS: The bilateral globes are intact. SINUSES: Minimal mucoperiosteal thickening of the left maxillary sinus and bilateral ethmoid air cells is seen. SOFT TISSUES/SKULL:  No acute abnormality of the visualized skull or soft tissues. 06/17/2020     CT CERVICAL SPINE WO CONTRAST    Result Date: 3/22/2022  EXAMINATION: CT OF THE CERVICAL SPINE WITHOUT CONTRAST 3/22/2022 9:35 am TECHNIQUE: CT of the cervical spine was performed without the administration of intravenous contrast. Multiplanar reformatted images are provided for review. Dose modulation, iterative reconstruction, and/or weight based adjustment of the mA/kV was utilized to reduce the radiation dose to as low as reasonably achievable. COMPARISON: None.  HISTORY: ORDERING SYSTEM PROVIDED HISTORY: Acute neck pain FINDINGS: BONES/ALIGNMENT: There are nondisplaced fractures of the C1 ring, involving the midline anterior arch and bilateral posterior arches (Ric burst fracture, type 3). There is also a type 2 odontoid fracture with minimal associated angulation. There is mild anterolisthesis at C3-C4, measuring 0.3 cm. There is mild anterolisthesis at C4-C5, measuring 0.3 cm. There is anterolisthesis at C7-T1, measuring 0.4 cm. This is likely due to severe facet arthropathy at multiple levels. No additional fractures or facet subluxation is observed. DEGENERATIVE CHANGES: There is severe multilevel disc space narrowing, endplate spurring, and facet arthropathy. There is moderate multilevel neural foraminal narrowing. Large bony spurs at the C6-C7 level results in at least moderate spinal canal narrowing. SOFT TISSUES: There is mild prevertebral soft tissue swelling at the level of the odontoid process, measuring 0.8 cm. 1.  Nondisplaced Ric burst fracture of the C1 atlas, involving midline anterior and bilateral posterior arches. Consider MRI to assess for soft tissue injury. 2.  Type 2 odontoid fracture with mild apex ventral angulation. 3.  Mild prevertebral soft tissue swelling at the level of the odontoid process. 4.  Mild anterolisthesis at C3-C4, C4-C5, and C7-T1, likely degenerative. 5.  Severe multilevel degenerative changes with at least moderate spinal canal narrowing at C6-C7. Critical results were called by Dr. Rivas Meadows to Dr. Antony Bullock On 3/22/2022 at 10:11.      MRI CERVICAL SPINE WO CONTRAST    Result Date: 3/22/2022  EXAMINATION: MRI OF THE CERVICAL SPINE WITHOUT CONTRAST 3/22/2022 6:09 pm TECHNIQUE: Multiplanar multisequence MRI of the cervical spine was performed without the administration of intravenous contrast. COMPARISON: CT 03/22/2022 HISTORY: ORDERING SYSTEM PROVIDED HISTORY: C1/2 fx TECHNOLOGIST PROVIDED HISTORY: C1/2 fx Decision Support Exception - unselect if not a suspected or confirmed emergency medical condition->Emergency Medical Condition (MA) Reason for Exam: C1/2 fx FINDINGS: BONES/ALIGNMENT: Abnormal edema involving the base of the odontoid consistent with type 2 fracture. Edema involving the C1-C2 articulation predental space likely corresponding to the C1-C2 injury. No distraction of the fracture fragments identified. No abnormal signal identified along the transverse ligament on the axial images provided. Otherwise no abnormal bone marrow edema elsewhere. SPINAL CORD: No cord contusion or hemorrhage identified. There is cord flattening at multiple levels likely due to the canal narrowing due to multilevel spondylosis. This is most notably at C5-C6, C6-C7, C7-T1. SOFT TISSUES: There is prevertebral thickening colitis to C4 due to prevertebral edema. Additionally, there is mild edema within the posterior soft tissues extending from the occiput to C5. C2-C3: Moderate to severe disc space disease. Moderate circumferential disc bulge. Severe facet arthropathy and ligamentum flavum hypertrophy identified. There is moderate bilateral foraminal narrowing and moderate canal narrowing. C3-C4: Moderate disc space disease. Diffuse osteophyte complex mission and uncovertebral spurring. Mild to moderate foraminal narrowing. No central canal narrowing. Mild facet arthropathy. C4-C5: Anterolisthesis 2-3 mm. Likely related to the advanced facet arthropathy. Moderate disc space disease identified with diffuse disc osteophyte complex mission and uncovertebral spurring. Moderate to severe right-sided and moderate left-sided foraminal narrowing. Moderate canal narrowing. C5-C6: Severe disc space disease. Diffuse osteophyte complex mission and uncovertebral spurring identified. There is severe right and moderate to severe left foraminal narrowing. Moderate canal narrowing. Mild flattening of the disc of the cord at this level. C6-C7: Severe disc space disease. Disc extrusion extending caudally.   There is moderate to severe central canal narrowing with cord flattening. Normal course identified at this level. There is severe right and moderate left foraminal narrowing. C7-T1: Severe disc space disease. Anterolisthesis measuring 3 mm identified. Slight uncovering posterior disc identified at this level. Moderate canal narrowing. Moderate to severe bilateral foraminal narrowing. Greatest on the left. Otherwise moderate to severe facet arthropathy identified. Type 2 odontoid fracture fracture involving the C1 arch better seen on recent CT. Prevertebral cord compression or contusion identified. Mild cord flattening due to multilevel severe degenerative changes. Prevertebral on posterior paraspinal edema without definite areas of high-grade ligamentous disruption or injury identified. XR CHEST PORTABLE    Result Date: 3/22/2022  EXAMINATION: ONE XRAY VIEW OF THE CHEST 3/22/2022 11:15 am COMPARISON: May 8, 2019 HISTORY: ORDERING SYSTEM PROVIDED HISTORY: admission TECHNOLOGIST PROVIDED HISTORY: admission FINDINGS: Stable cardiomediastinal silhouette. No significant pulmonary edema. No convincing lung consolidation or infiltrate. No pleural effusion or pneumothorax. There has been interval left shoulder total arthroplasty which appears intact. No acute cardiopulmonary process     CTA NECK W CONTRAST    Result Date: 3/22/2022  EXAMINATION: CTA OF THE NECK 3/22/2022 4:44 pm TECHNIQUE: CTA of the neck was performed with the administration of intravenous contrast. Multiplanar reformatted images are provided for review. MIP images are provided for review. Stenosis of the internal carotid arteries measured using NASCET criteria. Dose modulation, iterative reconstruction, and/or weight based adjustment of the mA/kV was utilized to reduce the radiation dose to as low as reasonably achievable.  COMPARISON: Same-day CT cervical spine HISTORY: ORDERING SYSTEM PROVIDED HISTORY: trauma TECHNOLOGIST PROVIDED HISTORY: trauma Decision Support Exception - unselect if not a suspected or confirmed emergency medical condition->Emergency Medical Condition (MA) FINDINGS: AORTIC ARCH/ARCH VESSELS: No dissection or arterial injury. No significant stenosis of the brachiocephalic or subclavian arteries. CAROTID ARTERIES: No dissection, arterial injury, or hemodynamically significant stenosis by NASCET criteria. VERTEBRAL ARTERIES: No dissection or significant stenosis. There is intimal irregularity in the right vertebral artery the most pronounced at C5-6. SOFT TISSUES: The lung apices are clear. No cervical or superior mediastinal lymphadenopathy. The larynx and pharynx are unremarkable. No acute abnormality of the salivary and thyroid glands. BONES: Please see same day CT cervical spine for further detail in the multiple cervical fractures present. 1. Intimal irregularity in the right vertebral artery. This could be atherosclerotic in nature, but grade 1 blunt cerebrovascular injury cannot be excluded. No significant stenosis. 2. No significant stenosis or occlusion of the cervical vasculature. CT CHEST ABDOMEN PELVIS W CONTRAST    Result Date: 3/22/2022  EXAMINATION: CT OF THE CHEST, ABDOMEN, AND PELVIS WITH CONTRAST 3/22/2022 4:44 pm TECHNIQUE: CT of the chest, abdomen and pelvis was performed with the administration of intravenous contrast. Multiplanar reformatted images are provided for review. Dose modulation, iterative reconstruction, and/or weight based adjustment of the mA/kV was utilized to reduce the radiation dose to as low as reasonably achievable. COMPARISON: CT cervical spine earlier the same day. HISTORY: ORDERING SYSTEM PROVIDED HISTORY: trauma TECHNOLOGIST PROVIDED HISTORY: trauma Decision Support Exception - unselect if not a suspected or confirmed emergency medical condition->Emergency Medical Condition (MA) C1 burst fracture. Type 2 odontoid fracture with angulation. Patient tripped over an appliance cord yesterday striking the side of her head.  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 No acute aortic abnormality. No mediastinal adenopathy. Heart is mildly enlarged with mild coronary artery calcification. Trace pericardial fluid without epicardial adenopathy. Small hiatal hernia. Lungs/pleura: Basilar atelectasis is noted. No extrapleural air or appreciable effusion. No suspicious pulmonary nodules. Tracheobronchial tree is patent. Soft Tissues/Bones: Moderate dextroscoliotic curvature thoracic spine. Patient has moderately severe spondylosis of the thoracic spine with sequential non marginal bridging osteophytes. Right shoulder arthroplasty. Abdomen/Pelvis: Organs: Liver is mildly fatty infiltrated without focal mass or ductal dilatation. Gallbladder demonstrates a Phrygian cap and is mildly dilated. Within the Phrygian cap of the gallbladder is lamellated gallstone of approximately 13 mm. Spleen, pancreas, adrenals, and kidneys demonstrate no acute abnormality. No ureteral dilatation. GI/Bowel: No free fluid, free air, bowel obstruction or bowel wall thickening is noted. Patient has a complex periumbilical hernia of 6 cm size containing the tip of a bowel loop and mostly fat without strangulation. Moderate-sized diverticula are scattered throughout the colon without evidence of acute diverticulitis. Pelvis: Appendix is visualized. No appendicitis. Uterus is abnormal in appearance. The uterus is heterogeneous and the endometrium is abnormally thickened at 18 mm. Bladder is unremarkable. Patient has a left fat containing inguinal hernia of 2.5 cm without strangulation. No free pelvic fluid, pelvic or inguinal adenopathy is seen. Peritoneum/Retroperitoneum: Scattered calcified plaque is noted. No aneurysm or dissection. No retroperitoneal mass, retroperitoneal or mesenteric adenopathy is noted. Bones/Soft Tissues: Arthritic changes are present in the hips, and symphysis pubis.   Grade 2 anterolisthesis L4 upon L5 is noted with severe canal stenosis L4-L5 and severe facet arthropathy lower lumbar region. Retrolisthesis L1 on L2 is noted with canal stenosis, moderate at this level. Scoliotic curvature is noted with significant spondylosis. Significant levoscoliotic curvature with rotation. No acute fracture. 1. 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 Coronary artery disease. No effusion or extrapleural air. Extensive at least moderate spondylosis thoracic spine. 2.  CT abdomen and pelvis: No bowel obstruction. Patient has a dilated gallbladder with gallstone. Periumbilical hernia containing fat and bowel without strangulation. Abnormal appearing uterus, enlarged for age with thickened endometrium. Left inguinal fat containing hernia without strangulation. Severe scoliotic curvature in the lumbar spine with multilevel advanced degenerative change, neural foraminal and canal stenosis fully described above without evidence of acute fracture. CT LUMBAR SPINE TRAUMA RECONSTRUCTION    Result Date: 3/22/2022  EXAMINATION: CT OF THE LUMBAR SPINE WITHOUT CONTRAST  3/22/2022 TECHNIQUE: CT of the lumbar spine was performed without the administration of intravenous contrast. Multiplanar reformatted images are provided for review. Adjustment of mA and/or kV according to patient size was utilized. Dose modulation, iterative reconstruction, and/or weight based adjustment of the mA/kV was utilized to reduce the radiation dose to as low as reasonably achievable. COMPARISON: Lumbar radiograph series 06/25/2014 HISTORY: Low back pain following trauma FINDINGS: BONES/ALIGNMENT: Chronic grade 2 anterolisthesis L4 on L5 and grade 1 anterolisthesis L5 on S1. Leftward listhesis L4 on L5. The vertebral body heights are maintained. No osseous destructive lesion is seen. Lower lumbar levorotatory scoliosis as seen previously.  DEGENERATIVE CHANGES: Diffuse moderately severe degenerative changes of the lumbar spine. SOFT TISSUES/RETROPERITONEUM: No paraspinal mass is seen. 1. No acute lumbar fracture evident. 2. Chronic grade 2 anterolisthesis L4 on L5 and grade 1 anterolisthesis L5 on S1. 3. Leftward listhesis L4 on L5. 4. Advanced degenerative changes appear similar to prior study, seen throughout the lumbar spine. 5. Lower lumbar levorotatory scoliosis. If pain persists or worsens, then additional characterization with MRI lumbar spine may be indicated. CT THORACIC SPINE TRAUMA RECONSTRUCTION    Result Date: 3/22/2022  EXAMINATION: CT OF THE THORACIC SPINE WITHOUT CONTRAST  3/22/2022 1:44 pm: TECHNIQUE: CT of the thoracic spine was performed without the administration of intravenous contrast. Multiplanar reformatted images are provided for review. Dose modulation, iterative reconstruction, and/or weight based adjustment of the mA/kV was utilized to reduce the radiation dose to as low as reasonably achievable. COMPARISON: None. HISTORY: ORDERING SYSTEM PROVIDED HISTORY: trauma TECHNOLOGIST PROVIDED HISTORY: trauma FINDINGS: BONES/ALIGNMENT: Coronal images demonstrate dextroscoliosis within the thoracic region. No traumatic malalignment is present. .  The vertebral body heights are maintained. No osseous destructive lesion is seen. DEGENERATIVE CHANGES: No gross spinal canal stenosis or bony neural foraminal narrowing of the thoracic spine. Multilevel degenerative changes are present throughout the thoracic spine. SOFT TISSUES: No paraspinal mass is seen. Bibasilar opacities are present, addressed on the dedicated CT scan of the chest.  Coronary arterial calcifications are present. No evidence of an acute fracture or traumatic malalignment involving the thoracic spine.        EKG      All EKG's are interpreted by the Emergency Department Physician who either signs or Co-signs this chart in the absence of a cardiologist.    [place]  COURSE:  Patient came to emergency department, HPI and physical exam were conducted. All nursing notes were reviewed. Patient has improvement in pain. Patient is admitted to trauma service      PROCEDURES:      CONSULTS:  Amisha Larios Dr. TO NEUROSURGERY  IP CONSULT TO ENDOVASCULAR NEUROSURGERY    CRITICAL CARE:      FINAL IMPRESSION      1.  Closed unstable burst fracture of first cervical vertebra, initial encounter ([facility] [address]    2. Closed odontoid fracture, initial encounter ([facility] [address]    3. Closed nondisplaced fracture of first cervical vertebra, unspecified fracture morphology, initial encounter [facility])          DISPOSITION / [address] Admitted 03/22/2022 09:02:15 PM      PATIENT REFERRED TO:  [facility] - [address], [address] [place] [phone]    In 6 weeks  followup for cervical xrays      DISCHARGE MEDICATIONS:  Current Discharge Medication List          Shawn Matos MD  Emergency Medicine Resident    (Please note that portions of thisnote were completed with a voice recognition program.  Efforts were made to edit the dictations but occasionally words are mis-transcribed.)        Shawn Matos MD  Resident  03/22/22 1395

## 2022-03-22 NOTE — ED PROVIDER NOTES
[facility] ED  [address]   Chief Complaint   Patient presents with    Fall     pt states she was sweeping yesterday at home and fell, c/o neck pain. Pt arrives from out patient radiology      HPI   Martha Glass is a 64 y.o. female who presents with fall. Onset was yesterday when she was vacuuming at home and she tripped over the power cord when bending over to change it. She hit the side of her head. She is not sure which side of her head. Regardless the cause of neck pain for which he saw her Dr. Patiño Prior today who then sent her for a cervical spine CT and was surprised to find that she had fractures of C1 and C2 so sent to the emergency room for further work-up where she was seen and then set up for transfer to [facility] so she could see neurosurgery/spine surgery. Pain is moderate , no neuro defecits      REVIEW OF SYSTEMS   Neurologic: Denies LOC, No hearing loss  Cardiac: Denies Chest Pain, Denies syncope  Respiratory: Denies cough or difficulty breathing  GI: Denies Bloody Stool or Diarrhea  : Denies Dysuria or Hematuria  General: Denies Fever  All other review of systems otherwise negative.        PAST MEDICAL & SURGICAL HISTORY   Past Medical History:   Diagnosis Date    Arthritis     osteo    Edema     Hyperlipidemia     Hypertension     Hypothyroidism     Kidney stones     Osteoarthritis     Thyroid disease     Unspecified transient cerebral ischemia      Past Surgical History:   Procedure Laterality Date    APPENDECTOMY      CATARACT REMOVAL       SECTION      x4    CORNEAL TRANSPLANT      bilat    FINGER SURGERY Left 2018    INDEX FINGER    HAMMER TOE SURGERY      right and left    JOINT REPLACEMENT Left 10/15/14    TKA    KNEE ARTHROSCOPY Left     LITHOTRIPSY      REPLACEMENT SHOULDER TOTAL Right 2019    SHOULDER TOTAL ARTHROPLASTY, OPEN PARTIAL CLAVICULECTOMY performed by Dominique Chase MD at [address] [facility] ARTHROPLASTY Right 06/11/2019    Dr. Titi Chávez   Current Outpatient Rx   Medication Sig Dispense Refill    hydroCHLOROthiazide (HYDRODIURIL) 25 MG tablet Take 1 tablet by mouth every other day 90 tablet 0    levothyroxine (SYNTHROID) 88 MCG tablet Take 1 tablet by mouth daily 90 tablet 0    lisinopril (PRINIVIL;ZESTRIL) 10 MG tablet Take 1 tablet by mouth daily 90 tablet 0    aspirin 81 MG chewable tablet Take 81 mg by mouth daily      Calcium Carbonate (CALCIUM 600 PO) Take by mouth daily      Multiple Vitamins-Minerals (PRESERVISION AREDS 2) CAPS Take by mouth daily      acetaminophen (TYLENOL) 500 MG tablet Take 500 mg by mouth every 6 hours as needed for Pain      Cholecalciferol (VITAMIN D3) 2000 UNITS CAPS Take 1,000 Int'l Units/day by mouth. ALLERGIES   No Known Allergies   SOCIAL & FAMILY HISTORY   Social History     Socioeconomic History    Marital status:      Spouse name: None    Number of children: None    Years of education: None    Highest education level: None   Occupational History    None   Tobacco Use    Smoking status: Never Smoker    Smokeless tobacco: Never Used   Substance and Sexual Activity    Alcohol use: No    Drug use: Never    Sexual activity: None   Other Topics Concern    None   Social History Narrative    None     Social Determinants of Health     Financial Resource Strain: Low Risk     Difficulty of Paying Living Expenses: Not hard at all   Food Insecurity: No Food Insecurity    Worried About Running Out of Food in the Last Year: Never true    Kevin of Food in the Last Year: Never true   Transportation Needs: No Transportation Needs    Lack of Transportation (Medical): No    Lack of Transportation (Non-Medical):  No   Physical Activity:     Days of Exercise per Week: Not on file    Minutes of Exercise per Session: Not on file   Stress:     Feeling of Stress : Not on file   Social Connections:     Frequency of Communication with Friends and Family: Not on file    Frequency of Social Gatherings with Friends and Family: Not on file    Attends Synagogue Services: Not on file    Active Member of Clubs or Organizations: Not on file    Attends Club or Organization Meetings: Not on file    Marital Status: Not on file   Intimate Partner Violence:     Fear of Current or Ex-Partner: Not on file    Emotionally Abused: Not on file    Physically Abused: Not on file    Sexually Abused: Not on file   Housing Stability:     Unable to Pay for Housing in the Last Year: Not on file    Number of [place] in the Last Year: Not on file    Unstable Housing in the Last Year: Not on file     Family History   Problem Relation Age of Onset    Diabetes Mother     High Blood Pressure Mother     Diabetes Father     Cancer Father         lung    High Blood Pressure Father     Diabetes Sister     Heart Disease Sister         PHYSICAL EXAM   VITAL SIGNS: /79   Pulse 79   Temp 98.4 °F (36.9 °C) (Tympanic)   Resp 16   Wt 167 lb (75.8 kg)   SpO2 95%   BMI 38.81 kg/m²   Constitutional: Well developed, well nourished  Eyes: Pupils equally round and react to light, sclera nonicteric  HENT: Atraumatic, no trismus  Neck: in cervical spine collar  Respiratory: Lungs Clear, no retractions   Cardiovascular: Reg rate, no murmurs  Vascular: ext warm  GI: Soft, nontender, normal bowel sounds  Back: no tenderness  Musculoskeletal: some mild edema, has bad arthritis in hands  Integument: Well hydrated, no petechiae   Neurologic: Alert & oriented, no slurred speech, moving all extremities  Psychiatric: Cooperative, pleasant affect           RADIOLOGY/PROCEDURES   CT Head WO Contrast    (Results Pending)   XR CHEST PORTABLE    (Results Pending)     ED COURSE & MEDICAL DECISION MAKING   Pertinent Labs & Imaging studies reviewed and interpreted.  (See chart for details)   See EMR for medications prescribed  Vitals:    03/22/22 1031   BP: 130/79   Pulse: 79   Resp: 16   Temp: 98.4 °F (36.9 °C)   SpO2: 95%     Differential diagnosis: Neurologic injury, Pulmonary injury, GI injury, Vascular injury, Fracture, Dislocation, Othe    MDM: Patient with C1-C2 fracture. Very dangerous. Will transfer to [facility] ER as trauma and for neurosurgical evaluation    FINAL IMPRESSION   1.  Closed unstable burst fracture of first cervical vertebra, initial encounter ([facility] [address]    2. Closed displaced fracture of second cervical vertebra, unspecified fracture morphology, initial encounter ([facility] [address]        PLAN  xfer to [facility]  Electronically signed by: Cindi Wei MD, 3/22/2022 11:18 AM  (This note was completed with a voice recognition program)        Cindi Wei MD  03/22/22 [id number] Colleen Flanagan MD  03/22/22 1125

## 2022-03-22 NOTE — ED NOTES
Pt arrived to ED alert and oriented x4 via [facility]. Pt is a transfer from [place] s/p fall. Pt reports that she tripped and fell yesterday, c/o head and neck pain. Pt was found to have C1 and C2 fractures (Ric, Type 2 Odontoid, nondisplaced) per CT at [facility]. Pt received 25 mcg Fentanyl and 4 mg Zofran prior to transport to ED. On arrival, pt has C-Collar in place. Pt reports posterior head pain and neck pain. Pt denies having been around anyone suspected to have COVID-19 or anyone that has been sick, denies recent travel outside the [facility] or [address]. Pt placed on cardiac monitor, continuous pulse ox, and BP cuff. RR even and unlabored. NAD noted. Whiteboard updated. Will continue with plan of care.      Elisa John RN  03/22/22 0261

## 2022-03-22 NOTE — ED NOTES
Contacted [facility] for [employer] ED per Dr. Sukh Sandoval request.     Servando MONTES Reser  03/22/22 7683

## 2022-03-22 NOTE — ED NOTES
Walter Johnson  31HC F  Fall yesterday  Mechanical fall  Saw PCP  C1/C2 fracture  Ric of C1  Type 2 odontoid  Hard collar  CT head pending  By ground     Ramon Sheldon RN  03/22/22 3505

## 2022-03-22 NOTE — CONSULTS
Endovascular Neurosurgery Consult      Reason for evaluation: Right Vertebral artery intimal irregularity     SUBJECTIVE:   History of Chief Complaint:    Valentín Maurer is a 64-year-old  female who presents as a transfer from [place] for trauma services following a mechanical fall when bending over yesterday evening 3/21/2022 around 5 PM.  Past medical history significant for hypertension, hyperlipidemia, rheumatoid arthritis with joint deformity of the left hand, thyroid disease and unspecified TIA. Patient is on aspirin 81 mg at baseline prior to admission. Patient states that she was bending over yesterday evening when she had a mechanical fall at that time falling forward hitting her head although did not have any loss of consciousness. Patient thought that she would be fine was able get back up without any focal weakness thus did not come to the emergency department till today. Patient woke up this morning with generalized headache 4 out of 10 nonfocal and stiff neck. Patient was found to have type II odontoid fracture with mild apex ventral angulation and prevertebral soft tissue swelling. CTA head and neck also concerning for right vertebral artery intimal irregularity. At this time patient has a nonfocal neurologic exam MRIs of 1 prior to admission. I do not feel that this right vertebral artery intimal irregularity is acutely symptomatic and recommend she continue aspirin 81 mg daily once okay by neurosurgery otherwise no acute intervention at this time and can follow-up as outpatient in 4 to 6 weeks with endovascular neurosurgery. Allergies  has No Known Allergies. Medications  Prior to Admission medications    Medication Sig Start Date End Date Taking?  Authorizing Provider   lisinopril (PRINIVIL;ZESTRIL) 10 MG tablet Take 1 tablet by mouth daily 3/21/22  Yes Tere Kimble MD   hydroCHLOROthiazide (HYDRODIURIL) 25 MG tablet Take 1 tablet by mouth every other day 3/22/22   Deidra Dimple Christianson MD   levothyroxine (SYNTHROID) 88 MCG tablet Take 1 tablet by mouth daily 3/22/22   Gage Ascencio MD   aspirin 81 MG chewable tablet Take 81 mg by mouth daily    Historical Provider, MD   Calcium Carbonate (CALCIUM 600 PO) Take by mouth daily    Historical Provider, MD   Multiple Vitamins-Minerals (PRESERVISION AREDS 2) CAPS Take by mouth daily    Historical Provider, MD   acetaminophen (TYLENOL) 500 MG tablet Take 500 mg by mouth every 6 hours as needed for Pain    Historical Provider, MD   Cholecalciferol (VITAMIN D3) 2000 UNITS CAPS Take 1,000 Int'l Units/day by mouth. Historical Provider, MD    Scheduled Meds:   aspirin  81 mg Oral Daily     Continuous Infusions:  PRN Meds:.ondansetron **OR** ondansetron  Past Medical History   has a past medical history of Arthritis, Edema, Hyperlipidemia, Hypertension, Hypothyroidism, Kidney stones, Osteoarthritis, Thyroid disease, and Unspecified transient cerebral ischemia. Past Surgical History   has a past surgical history that includes Lithotripsy; Knee arthroscopy (Left);  section; Tonsillectomy; Appendectomy; Hammer toe surgery; Cataract removal; Corneal transplant; joint replacement (Left, 10/15/14); Finger surgery (Left, 2018); Shoulder Arthroplasty (Right, 2019); and REPLACEMENT SHOULDER TOTAL (Right, 2019). Social History   reports that she has never smoked. She has never used smokeless tobacco.   reports no history of alcohol use. reports no history of drug use. Family History  family history includes Cancer in her father; Diabetes in her father, mother, and sister; Heart Disease in her sister; High Blood Pressure in her father and mother.     Review of Systems:  CONSTITUTIONAL:  negative for fevers, chills, fatigue and malaise    EYES:  negative for double vision, blurred vision and photophobia     HEENT:  negative for tinnitus, epistaxis and sore throat    RESPIRATORY:  negative for cough, shortness of breath, wheezing CARDIOVASCULAR:  negative for chest pain, palpitations, syncope, edema    GASTROINTESTINAL:  negative for nausea, vomiting    GENITOURINARY:  negative for incontinence    MUSCULOSKELETAL:   Positive for cervical pain currently in a c-collar   NEUROLOGICAL:   Positive for generalized headache 4 out of 10 and neck ache   PSYCHIATRIC:  negative for anxiety      Review of systems otherwise negative. OBJECTIVE:     Vitals:    22 1747   BP: 134/89   Pulse: 82   Resp: 16   Temp:    SpO2: 99%        General:  Gen: normal habitus, NAD  HEENT: NCAT, mucosa moist  Cvs: RRR, S1 S2 normal  Resp: symmetric unlabored breathing  Abd: s/nd/nt  Ext: no edema  Skin: no lesions seen, warm and dry    Neuro:  Gen: awake and alert, oriented x3. Lang/speech: no aphasia or dysarthria. Follows commands. CN: PERRL, EOMI, VFF, V1-3 intact, face symmetric, hearing intact, shoulder shrug symmetric, tongue midline  Motor: grossly 5/5 UE and LE b/l  Sense: LT intact in all 4 ext. Coord: FTN and HTS intact b/l  DTR: deferred  Gait: narrow base gait    NIH Stroke Scale:   1a  Level of consciousness: 0 - alert; keenly responsive   1b. LOC questions:  0 - answers both questions correctly   1c. LOC commands: 0 - performs both tasks correctly   2. Best Gaze: 0 - normal   3. Visual: 0 - no visual loss   4. Facial Palsy: 0 - normal symmetric movement   5a. Motor left arm: 0 - no drift, limb holds 90 (or 45) degrees for full 10 seconds   5b. Motor right arm: 0 - no drift, limb holds 90 (or 45) degrees for full 10 seconds   6a. Motor left le - no drift; leg holds 30 degree position for full 5 seconds   6b  Motor right le - no drift; leg holds 30 degree position for full 5 seconds   7. Limb Ataxia: 0 - absent   8. Sensory: 0 - normal; no sensory loss   9. Best Language:  0 - no aphasia, normal   10. Dysarthria: 0 - normal   11.  Extinction and Inattention: 0 - no abnormality         Total:   0     MRS: 1 at baseline   Modified finding and less likely grade 1 blunt cerebrovascular injury although cannot entirely rule this out. Given her cervical fracture would not recommend any acute intervention or further work-up at this time although pending neurosurgical clearance can continue aspirin 81 mg daily. We will follow-up as outpatient with endovascular neurosurgery in 4 to 6 weeks. Assessment  1. Mechanical fall with acute Ric burst fracture type III and type II odontoid fracture with minimal associated angulation  2. Questionable right vertebral artery luminal irregularity felt less likely to be traumatic at this time certainly not symptomatic and more likely chronic finding related to her tortuous vessels    PLAN:   -Continue aspirin 81 mg once cleared by neurosurgery-this is not an acute requirement and will need careful consideration considering she has an acute type II odontoid fracture and Ric burst fracture type III. -Follow-up with endovascular neurosurgery in 4 to 6 weeks as outpatient  -Rest of medical management per trauma primary team and general neurosurgery team    Case discussed with Dr. Raphael Rivera attending.     Salvador Javier MD   PGY-3 Neurology Resident   Stroke, [facility]  [address] [place]  Electronically signed 3/22/2022 at 7:24 PM

## 2022-03-22 NOTE — ED NOTES
Report called to charge RN at [facility]. Aware of ETA.  KIERAN Tuttle RN  03/22/22 0734

## 2022-03-22 NOTE — H&P
TRAUMA HISTORY AND PHYSICAL EXAMINATION    PATIENT NAME: Flaquito Strange  YOB: 1945  MEDICAL RECORD NO. [id number]   DATE: 3/22/2022  PRIMARY CARE PHYSICIAN: Judith Skinner MD  PATIENT EVALUATED AT THE REQUEST OF DRHyacinth: Eva Araya    ACTIVATION   []Trauma Alert     [] Trauma Priority     [x]Trauma Consult. IMPRESSION:     Patient Active Problem List   Diagnosis    Transient cerebral ischemia    Essential hypertension    Hyperlipidemia    Primary osteoarthritis involving multiple joints    Hypothyroidism    Primary osteoarthritis of right shoulder    Morbidly obese (HCC)    Skin rash    Cellulitis of buttock    Hair loss    Primary hypertension    Ric fracture with nonunion, subsequent encounter       MEDICAL DECISION MAKING AND PLAN:       N: C1-2 fractures: pending neurosurgery evaluation. Will hold diet and AC until evaluation. Will obtain CT imaging of rest of spine, chest/a/p. Pain and nausea control as needed. MMPT: tylenol. Roxicodone, gabapentin. C: HDS. Home meds signed and held for floor  P: Encourage IS, deep breathing, and cough. FEN: Replete lytes PRN. IV Fluids: TKO. GI: Diet: NPO. Bowel reg.  R: Voiding   Monitor I/O's.  H: VTE ppx: held for possible surgical intervention. I: Afebrile. Monitor CBC. Abx: none. E: Monitor BG.  M: PT/OT after neurosurgery evaluation. LDA: PIV  Dispo: SD.    CONSULT SERVICES    [x] Neurosurgery     [] Orthopedic Surgery    [] Cardiothoracic     [] Facial Trauma    [] Plastic Surgery (Burn)    [] Pediatric Surgery     [] Internal Medicine    [] Pulmonary Medicine    [] Other:      HISTORY:     Chief Complaint:  Fall    INJURY SUMMARY  C1 burst fracture  C2 displaced fracture  Both closed    If intracranial hemorrhage is present, is it a BIG 1 category: [] YES  [x]NO    GENERAL DATA  Age 64 y.o.  female   Patient information was obtained from patient. History/Exam limitations: none.   Patient presented to the Emergency Department by ambulance, transfer from [facility]  Injury Date: 3/21/22  Approximate Injury Time: evening       Transport mode:   [x]Ambulance      [] Helicopter     []Car       [] Other  Referring Hospital: [address] [place] [address], (e.g., home, farm, industry, street)  Specific Details of Location (e.g., bedroom, kitchen, garage): living room  Type of Residence (if occurred in home setting) (e.g., apartment, mobile home, single family home): home    MECHANISM OF INJURY    [] Motor Vehicle Collision   Specific vehicle type involved (e.g., sedan, minivan, SUV, pickup truck):   Collision with (e.g., type of vehicle, building, barn, ditch, tree):     Type of collision  [] Single Vehicle Collision  []Multiple Vehicle Collision  [] unknown collision type    Mechanism considerations  [] Fatality in Same Vehicle      []Ejected       []Rollover          []Extricated    Internal Compartment   []                      []Passenger:      []Front Seat        []Rear Seat     Personal Restraints  [] Unrestrained   []Lap Belt Only Restrained   [] Shoulder Belt Only Restrained  [] 3 Point Restrained  [] unknown     Air Bags  [] Front Air Bag  []Side Air Bag  []Curtain Airbag []Air Bag Not Deployed    []No Air Bag equipped in vehicle      Pediatric Consideration:      [] Booster Seat  []Infant Car Seat  [] Child Car Seat      [] Motorcycle Collision   Wearing Helmet     []Yes     []No    []Unknown    [] ATV crash  Wearing Helmet     []Yes     []No    []Unknown    [] Bicycle Collision Wearing Helmet     []Yes     []No    []Unknown    [] Pedestrian Struck         [x] Fall    [x]From Standing     []From Height  Ft     []Down Stairs ___steps    [] Assault    [] Gunshot  Specify caliber / type of gun: ____________________________    [] Stabbing  Specify weapon type, size: _____________________________    [] Burn  []Flame   []Scald   []Electrical   []Chemical  []Inhalation   []House fire    [] Other ______________________________________________________    [] Other protective devices used / worn ___________________________    HISTORY:     Haydee Velasco is a 64 y.o. female that presented to the Emergency Department following a fall yesterday in the evening when she was unplugging her vacuum. On the fall she hit her head. There was no loss of consciousness and was not on the ground long. She went to her PCP for neck pain following the fall this morning and Dr. Antony Bullock ordered a cervical spine CT. This demonstrated C1-2 fractures and she was sent to the [facility] emergency department for further evaluation. Currently does not have numbness or tingling. No focal neuro deficits. Was transferred to [place] [address] for neurosurgery evaluation. Fentanyl has controlled pain well. Wearing c-collar. Loss of Consciousness [x]No   []Yes Duration(min)       [] Unknown     Total Fluids Given Prior To Arrival  mL    MEDICATIONS:   []  None     []  Information not available due to exam limitations documented above  Prior to Admission medications    Medication Sig Start Date End Date Taking? Authorizing Provider   hydroCHLOROthiazide (HYDRODIURIL) 25 MG tablet Take 1 tablet by mouth every other day 3/22/22   Daphne Caldwell MD   levothyroxine (SYNTHROID) 88 MCG tablet Take 1 tablet by mouth daily 3/22/22   Daphne Caldwell MD   lisinopril (PRINIVIL;ZESTRIL) 10 MG tablet Take 1 tablet by mouth daily 3/21/22   Daphne Caldwell MD   aspirin 81 MG chewable tablet Take 81 mg by mouth daily    Historical Provider, MD   Calcium Carbonate (CALCIUM 600 PO) Take by mouth daily    Historical Provider, MD   Multiple Vitamins-Minerals (PRESERVISION AREDS 2) CAPS Take by mouth daily    Historical Provider, MD   acetaminophen (TYLENOL) 500 MG tablet Take 500 mg by mouth every 6 hours as needed for Pain    Historical Provider, MD   Cholecalciferol (VITAMIN D3) 2000 UNITS CAPS Take 1,000 Int'l Units/day by mouth.     Historical Provider, MD ALLERGIES:   []  None    []   Information not available due to exam limitations documented above   Patient has no known allergies. PAST MEDICAL HISTORY: []  None   []   Information not available due to exam limitations documented above    has a past medical history of Arthritis, Edema, Hyperlipidemia, Hypertension, Hypothyroidism, Kidney stones, Osteoarthritis, Thyroid disease, and Unspecified transient cerebral ischemia. has a past surgical history that includes Lithotripsy; Knee arthroscopy (Left);  section; Tonsillectomy; Appendectomy; Hammer toe surgery; Cataract removal; Corneal transplant; joint replacement (Left, 10/15/14); Finger surgery (Left, 2018); Shoulder Arthroplasty (Right, 2019); and REPLACEMENT SHOULDER TOTAL (Right, 2019). FAMILY HISTORY   []   Information not available due to exam limitations documented above    family history includes Cancer in her father; Diabetes in her father, mother, and sister; Heart Disease in her sister; High Blood Pressure in her father and mother. SOCIAL HISTORY  []   Information not available due to exam limitations documented above     reports that she has never smoked. She has never used smokeless tobacco.   reports no history of alcohol use. reports no history of drug use. PERTINENT SYSTEMIC REVIEW:    []   Information not available due to exam limitations documented above    Review of Systems   Constitutional: Positive for activity change. HENT: Negative for facial swelling. Eyes: Negative for visual disturbance. Respiratory: Negative for shortness of breath. Cardiovascular: Negative for chest pain. Gastrointestinal: Negative for abdominal pain. Musculoskeletal: Positive for neck pain. Negative for back pain. Allergic/Immunologic:        NKA   Neurological: Positive for headaches. Negative for weakness.    Hematological:        Takes aspirin only           PHYSICAL EXAMINATION:     Amber Randolph SCALE  NEUROMUSCULAR BLOCKADE PRIOR TO ARRIVAL     [x]No        []Yes      Variable  Score   Variable  Score  Eye opening [x]Spontaneous 4 Verbal  [x]Oriented  5     []To voice  3   []Confused  4    []To pain  2   []Inapp words  3    []None  1   []Incomp words 2       []None  1   Motor   [x]Obeys  6    []Localizes pain 5    []Withdraws(pain) 4    []Flexion(pain) 3  []Extension(pain) 2    []None  1     GCS Total = 15    PHYSICAL EXAMINATION    VITAL SIGNS:   Vitals:    03/22/22 1647   BP: 127/87   Pulse: 77   Resp: 14   Temp:    SpO2: 96%       Physical Exam  Constitutional:       General: She is not in acute distress. HENT:      Head: Normocephalic and atraumatic. Eyes:      Conjunctiva/sclera: Conjunctivae normal.      Pupils: Pupils are equal, round, and reactive to light. Neck:      Comments: c-collar in place  Cardiovascular:      Rate and Rhythm: Normal rate. Pulses: Normal pulses. Pulmonary:      Effort: Pulmonary effort is normal.   Abdominal:      General: Abdomen is flat. There is no distension. Tenderness: There is no abdominal tenderness. There is no guarding or rebound. Musculoskeletal:         General: No deformity. Right lower leg: No edema. Left lower leg: No edema. Skin:     General: Skin is warm and dry. Capillary Refill: Capillary refill takes less than 2 seconds. Neurological:      Mental Status: She is alert and oriented to person, place, and time. Sensory: No sensory deficit. Motor: No weakness. Psychiatric:         Mood and Affect: Mood normal.          RADIOLOGY  CTA NECK W CONTRAST   Final Result   1. Intimal irregularity in the right vertebral artery. This could be   atherosclerotic in nature, but grade 1 blunt cerebrovascular injury cannot be   excluded. No significant stenosis. 2. No significant stenosis or occlusion of the cervical vasculature.          CT CHEST ABDOMEN PELVIS W CONTRAST    (Results Pending)   CT THORACIC SPINE TRAUMA RECONSTRUCTION    (Results Pending)   CT LUMBAR SPINE TRAUMA RECONSTRUCTION    (Results Pending)   MRI CERVICAL SPINE WO CONTRAST    (Results Pending)         LABS    Labs Reviewed - No data to display      Nia Olivier DO  3/22/22, 5:37 PM      Attending Note      I have reviewed the above GCS note(s) and I either performed the key elements of the medical history and physical exam or was present with the trauma resident when the key elements of the medical history and physical exam were performed. I have discussed the findings, established the care plan and recommendations with the trauma team.  Will review scans remaining spine as well as CTA. Consult NS.     Primitivo Elizalde MD  3/22/2022  7:25 PM

## 2022-03-22 NOTE — CARE COORDINATION
Case Management Initial Discharge Plan  Abraham Schaumann,             Met with:patient to discuss discharge plans. Information verified: address, contacts, phone number, , insurance Yes  Insurance Provider: [employer] then medicare    Emergency Contact/Next of Kin name & number: Raphael Ibarra, Daughter Leatha Franklin  Who are involved in patient's support system? Raphael Ibarra lives with the pt    PCP: Gage Ascencio MD  Date of last visit: today      Discharge Planning    Living Arrangements:    raphael Ibarra lives with pt    Home has 1 stories  1 stairs to climb to get into front door, no Main stairs to climb to reach second floor  Location of bedroom/bathroom in home main    Patient able to perform ADL's:Independent    Current Services (outpatient & in home) cane walker  DME equipment: as above  DME provider: na    Is patient receiving oral anticoagulation therapy? No    If indicated: ASA  Physician managing anticoagulation treatment: na  Where does patient obtain lab work for ATC treatment? na      Potential Assistance Needed:       Patient agreeable to home care: No  [place] of choice provided:  no    Prior SNF/Rehab Placement and Facility: none  Agreeable to SNF/Rehab: No  [place] of choice provided: no     Evaluation: n/a    Expected Discharge date:       Patient expects to be discharged to:   home vs Rehab    If home: is the family and/or caregiver wiling & able to provide support at home? yes  Who will be providing this support? Raphael Ibarra lives with pt    Follow Up Appointment: Best Day/ Time:      Transportation provider: family  Transportation arrangements needed for discharge: No unless rehab    Readmission Risk              Risk of Unplanned Readmission:  0             Does patient have a readmission risk score greater than 14?: no  If yes, follow-up appointment must be made within 7 days of discharge.      Goals of Care: pain control      Educated pt on transitional options, provided freedom of choice and are agreeable with plan      Discharge Plan: home vs SNF/IP Rehab, will need PT/OT evals to determine needs, pt relates she lost her balance that is how she fell          Electronically signed by Michelle Jamison RN on 3/22/22 at 4:45 PM EDT

## 2022-03-22 NOTE — ED NOTES
Pt log rolled with C-Spine precautions maintained per writer. Pt denied pain on palpation along TLS.      Elizabeth Bess RN  03/22/22 8912

## 2022-03-22 NOTE — ED NOTES
Labeled blood specimens sent to lab via tube system.     [] Lavender   [] on ice   [] Blue   [x] Green/yellow  [] Green/black [] on ice  [] Pink  [] Red  [] Yellow  [] Blood Cultures      Joe Mcnair RN  03/22/22 4903

## 2022-03-22 NOTE — FLOWSHEET NOTE
Pt. Transported to [facility] from [facility] after falling at home where she lives with her son yesterday. Pt. Found o have fracture and is in neck brace. She is tired and in and out of sleep while talking to . She is not sure if her son will come from [facility] today or just wait until tomorrow. Pt. Calm and sleeping when  left.     03/22/22 1637   Encounter Summary   Services provided to: Patient   Referral/Consult From: Multi-disciplinary team   Support System Children;Family members   Continue Visiting   (03/22/2022)   Complexity of Encounter Moderate   Length of Encounter 30 minutes   Spiritual Assessment Completed Yes   Routine   Type Follow up   Assessment Calm; Approachable; Hopeful   Intervention Active listening;Explored feelings, thoughts, concerns;Explored coping resources;Nurtured hope;Sustaining presence/ Ministry of presence; Discussed meaning/purpose   Outcome Comfort;Expressed gratitude;Coping

## 2022-03-22 NOTE — ED PROVIDER NOTES
[facility]     Emergency Department     Faculty Attestation    I performed a history and physical examination of the patient and discussed management with the resident. I reviewed the residents note and agree with the documented findings including all diagnostic interpretations and plan of care. Any areas of disagreement are noted on the chart. I was personally present for the key portions of any procedures. I have documented in the chart those procedures where I was not present during the key portions. I have reviewed the emergency nurses triage note. I agree with the chief complaint, past medical history, past surgical history, allergies, medications, social and family history as documented unless otherwise noted below. Documentation of the HPI, Physical Exam and Medical Decision Making performed by scribes is based on my personal performance of the HPI, PE and MDM. For Physician Assistant/ Nurse Practitioner cases/documentation I have personally evaluated this patient and have completed at least one if not all key elements of the E/M (history, physical exam, and MDM). Additional findings are as noted. This patient was evaluated in the Emergency Department for symptoms described in the history of present illness. He/she was evaluated in the context of the global COVID-19 pandemic, which necessitated consideration that the patient might be at risk for infection with the SARS-CoV-2 virus that causes COVID-19. Institutional protocols and algorithms that pertain to the evaluation of patients at risk for COVID-19 are in a state of rapid change based on information released by regulatory bodies including the CDC and federal and state organizations. These policies and algorithms were followed during the patient's care in the ED. Primary Care Physician: Luci Montoya MD    History:  This is a 64 y.o. female who presents to the Emergency Department with complaint of neck pain. Patient mechanical fall earlier today. Imaging at [place] showed C1-C2 fractures. Denies any numbness or weakness. Reports chronic arthritis issues but no new injury otherwise. Physical:     height is 4' 7\" (1.397 m) and weight is 167 lb (75.8 kg). Her oral temperature is 98.7 °F (37.1 °C). Her blood pressure is 152/95 (abnormal) and her pulse is 81. Her respiration is 16 and oxygen saturation is 97%.    64 y.o. female no acute distress, appears somewhat uncomfortable. C-collar in place. No tenderness or deformity to the extremities except for chronic appearing arthritic changes in the hands bilaterally. Strength is 5 out of 5 in all 4 extremities. Sensation intact compared left to right. Impression: High cervical fractures    Plan: Trauma, neurosurgery consultation. Admission.       Milagros Garcia MD, Savage Crawford  Attending Emergency Physician        Yamilet Story MD  03/22/22 5981

## 2022-03-22 NOTE — PROGRESS NOTES
Patient is here for her complaints of falling last night while trying to plug in her vacuum. Stets she hit her head and neck on a chair, no loss of consciousness but does have a headache today. She is having a lot of neck pain and stiffness. She has been walking with walker. Denies any weakness of arm or leg. No loss of consciousness        CURRENT ALLERGIES: Patient has no known allergies. PAST MEDICAL HISTORY:   Past Medical History:   Diagnosis Date    Arthritis     osteo    Edema     Hyperlipidemia     Hypertension     Hypothyroidism     Kidney stones     Osteoarthritis     Thyroid disease     Unspecified transient cerebral ischemia        SURGICAL HISTORY:   Past Surgical History:   Procedure Laterality Date    APPENDECTOMY      CATARACT REMOVAL       SECTION      x4    CORNEAL TRANSPLANT      bilat    FINGER SURGERY Left 2018    INDEX FINGER    HAMMER TOE SURGERY      right and left    JOINT REPLACEMENT Left 10/15/14    TKA    KNEE ARTHROSCOPY Left     LITHOTRIPSY      REPLACEMENT SHOULDER TOTAL Right 2019    SHOULDER TOTAL ARTHROPLASTY, OPEN PARTIAL CLAVICULECTOMY performed by Dominique Chase MD at [address] [place] [address] Right 2019    Dr. Bandar Oconnell HISTORY:   Family History   Problem Relation Age of Onset    Diabetes Mother     High Blood Pressure Mother     Diabetes Father     Cancer Father         lung    High Blood Pressure Father     Diabetes Sister     Heart Disease Sister        SOCIAL HISTORY:   Social History     Tobacco Use    Smoking status: Never Smoker    Smokeless tobacco: Never Used   Substance Use Topics    Alcohol use: No    Drug use: Never     Prior to Admission medications    Medication Sig Start Date End Date Taking?  Authorizing Provider   hydroCHLOROthiazide (HYDRODIURIL) 25 MG tablet Take 1 tablet by mouth every other day 3/22/22  Yes Anton Espinoza MD   levothyroxine (SYNTHROID) 88 MCG tablet Take 1 tablet by mouth daily 3/22/22  Yes Joelle Olivera MD   lisinopril (PRINIVIL;ZESTRIL) 10 MG tablet Take 1 tablet by mouth daily 3/21/22   Joelle Olivera MD   aspirin 81 MG chewable tablet Take 81 mg by mouth daily    Historical Provider, MD   Calcium Carbonate (CALCIUM 600 PO) Take by mouth daily    Historical Provider, MD   Multiple Vitamins-Minerals (PRESERVISION AREDS 2) CAPS Take by mouth daily    Historical Provider, MD   acetaminophen (TYLENOL) 500 MG tablet Take 500 mg by mouth every 6 hours as needed for Pain    Historical Provider, MD   Cholecalciferol (VITAMIN D3) 2000 UNITS CAPS Take 1,000 Int'l Units/day by mouth. Historical Provider, MD       Review of Systems:  Constitutional: negative for fevers or chills,no loss of consciousness  Eyes: negative for visual disturbance   ENT: negative for sore throat or nasal congestion  Respiratory: negative for shortness of breath or cough  Cardiovascular: negative for chest pain ,palpitations,pnd,syncope  Gastrointestinal: negative for abd pain, nausea, vomiting, diarrhea , constipation,hemetemesis,kate,blood in stool  Genitourinary: negative for dysuria, urgency ,frequency,hematuria  Integument/breast: negative for skin rash or lesions  Neurological: negative for unilateral weakness, numbness or tingling. Skeletal Muscular: has neck pain,has multiple joint pain,jont swelling,no back pain    Subjective:  Vitals:    03/22/22 0852   BP: (!) 146/84   Pulse: 88   Resp: 16         Exam:  GEN:   A & O x3, no apparent distress  EYES: No gross abnormalities.   ENT:right and left TM normal without fluid or infection  NECK: no lymphadenopathy, neck movements painful and restricted,  no carotid bruits  PULM: clear to auscultation bilaterally- no wheezes, rales or rhonchi, normal air movement, no respiratory distress  COR: regular rate & rhythm, no murmurs and no gallops  ABD:  soft, non-tender, non-distended, normal bowel sounds, no masses or organomegaly  : deferred  EXT: Extremities: + 2 pedal pulses, has non pitting edema ,no calf tenderness, and warm to touch. Normal nails without lesions  Skeletomuscular: neck- has tenderness upper neck,flexion,extension and rotation is painful and restriced. Bonita's sigh is positive  NEURO: Motor and sensory grossly intact,DTR- normal  SKIN:  No skin lesions or rashes      Assessment:  1. Acute neck pain    2. Hypothyroidism, unspecified type    3. Essential hypertension    4. Fall, initial encounter          Plan:  Orders Placed This Encounter   Procedures    CT CERVICAL SPINE WO CONTRAST     Standing Status:   Future     Standing Expiration Date:   3/22/2023     No follow-ups on file.    Orders Placed This Encounter   Medications    hydroCHLOROthiazide (HYDRODIURIL) 25 MG tablet     Sig: Take 1 tablet by mouth every other day     Dispense:  90 tablet     Refill:  0    levothyroxine (SYNTHROID) 88 MCG tablet     Sig: Take 1 tablet by mouth daily     Dispense:  90 tablet     Refill:  0     Ct c spine- has non displaced c1,c2 fr  Sent to er for transfer to [facility]  Discussed with Dr Gage Hogue in er      Electronically signed by Bill Mcdonald MD on 3/22/2022 at 9:17 AM         Bill Mcdonald MD, MD

## 2022-03-22 NOTE — ED NOTES
Labeled COVID swab sent to lab via tube system.     [x] COVID-19 swab      [x] Rapid   [] Non- Rapid/PCR  [] Respiratory Panel with [address] [place] [address]  RN  03/22/22 2322

## 2022-03-23 LAB
ABSOLUTE EOS #: 0.09 K/UL (ref 0–0.44)
ABSOLUTE IMMATURE GRANULOCYTE: 0 K/UL (ref 0–0.3)
ABSOLUTE LYMPH #: 0.96 K/UL (ref 1.1–3.7)
ABSOLUTE MONO #: 1.74 K/UL (ref 0.1–1.2)
ANION GAP SERPL CALCULATED.3IONS-SCNC: 10 MMOL/L (ref 9–17)
BASOPHILS # BLD: 0 % (ref 0–2)
BASOPHILS ABSOLUTE: 0 K/UL (ref 0–0.2)
BUN BLDV-MCNC: 18 MG/DL (ref 8–23)
CALCIUM SERPL-MCNC: 9.8 MG/DL (ref 8.6–10.4)
CHLORIDE BLD-SCNC: 99 MMOL/L (ref 98–107)
CO2: 29 MMOL/L (ref 20–31)
CREAT SERPL-MCNC: 0.57 MG/DL (ref 0.5–0.9)
EKG ATRIAL RATE: 81 BPM
EKG P AXIS: 30 DEGREES
EKG P-R INTERVAL: 198 MS
EKG Q-T INTERVAL: 424 MS
EKG QRS DURATION: 146 MS
EKG QTC CALCULATION (BAZETT): 492 MS
EKG R AXIS: -95 DEGREES
EKG T AXIS: 17 DEGREES
EKG VENTRICULAR RATE: 81 BPM
EOSINOPHILS RELATIVE PERCENT: 1 % (ref 1–4)
GFR AFRICAN AMERICAN: >60 ML/MIN
GFR NON-AFRICAN AMERICAN: >60 ML/MIN
GFR SERPL CREATININE-BSD FRML MDRD: ABNORMAL ML/MIN/{1.73_M2}
GLUCOSE BLD-MCNC: 156 MG/DL (ref 70–99)
HCT VFR BLD CALC: 37.3 % (ref 36.3–47.1)
HEMOGLOBIN: 12.6 G/DL (ref 11.9–15.1)
IMMATURE GRANULOCYTES: 0 %
LYMPHOCYTES # BLD: 11 % (ref 24–43)
MAGNESIUM: 1.8 MG/DL (ref 1.6–2.6)
MCH RBC QN AUTO: 33.2 PG (ref 25.2–33.5)
MCHC RBC AUTO-ENTMCNC: 33.8 G/DL (ref 28.4–34.8)
MCV RBC AUTO: 98.2 FL (ref 82.6–102.9)
MONOCYTES # BLD: 20 % (ref 3–12)
MORPHOLOGY: NORMAL
NRBC AUTOMATED: 0 PER 100 WBC
PDW BLD-RTO: 13.1 % (ref 11.8–14.4)
PLATELET # BLD: 168 K/UL (ref 138–453)
PMV BLD AUTO: 10.3 FL (ref 8.1–13.5)
POTASSIUM SERPL-SCNC: 3.4 MMOL/L (ref 3.7–5.3)
RBC # BLD: 3.8 M/UL (ref 3.95–5.11)
SEG NEUTROPHILS: 68 % (ref 36–65)
SEGMENTED NEUTROPHILS ABSOLUTE COUNT: 5.91 K/UL (ref 1.5–8.1)
SODIUM BLD-SCNC: 138 MMOL/L (ref 135–144)
WBC # BLD: 8.7 K/UL (ref 3.5–11.3)

## 2022-03-23 PROCEDURE — 97530 THERAPEUTIC ACTIVITIES: CPT

## 2022-03-23 PROCEDURE — 99233 SBSQ HOSP IP/OBS HIGH 50: CPT | Performed by: PSYCHIATRY & NEUROLOGY

## 2022-03-23 PROCEDURE — 83735 ASSAY OF MAGNESIUM: CPT

## 2022-03-23 PROCEDURE — 2580000003 HC RX 258: Performed by: STUDENT IN AN ORGANIZED HEALTH CARE EDUCATION/TRAINING PROGRAM

## 2022-03-23 PROCEDURE — 6370000000 HC RX 637 (ALT 250 FOR IP): Performed by: STUDENT IN AN ORGANIZED HEALTH CARE EDUCATION/TRAINING PROGRAM

## 2022-03-23 PROCEDURE — 2580000003 HC RX 258

## 2022-03-23 PROCEDURE — 80048 BASIC METABOLIC PNL TOTAL CA: CPT

## 2022-03-23 PROCEDURE — 92610 EVALUATE SWALLOWING FUNCTION: CPT

## 2022-03-23 PROCEDURE — 93010 ELECTROCARDIOGRAM REPORT: CPT | Performed by: INTERNAL MEDICINE

## 2022-03-23 PROCEDURE — 85025 COMPLETE CBC W/AUTO DIFF WBC: CPT

## 2022-03-23 PROCEDURE — 2060000000 HC ICU INTERMEDIATE R&B

## 2022-03-23 PROCEDURE — 97166 OT EVAL MOD COMPLEX 45 MIN: CPT

## 2022-03-23 PROCEDURE — 6360000002 HC RX W HCPCS: Performed by: STUDENT IN AN ORGANIZED HEALTH CARE EDUCATION/TRAINING PROGRAM

## 2022-03-23 PROCEDURE — 2700000000 HC OXYGEN THERAPY PER DAY

## 2022-03-23 PROCEDURE — 97535 SELF CARE MNGMENT TRAINING: CPT

## 2022-03-23 PROCEDURE — 97162 PT EVAL MOD COMPLEX 30 MIN: CPT

## 2022-03-23 PROCEDURE — 94664 DEMO&/EVAL PT USE INHALER: CPT

## 2022-03-23 PROCEDURE — 94761 N-INVAS EAR/PLS OXIMETRY MLT: CPT

## 2022-03-23 RX ORDER — ASPIRIN 81 MG/1
81 TABLET, CHEWABLE ORAL DAILY
Status: DISCONTINUED | OUTPATIENT
Start: 2022-03-23 | End: 2022-03-24 | Stop reason: HOSPADM

## 2022-03-23 RX ORDER — DEXTROSE AND SODIUM CHLORIDE 5; .45 G/100ML; G/100ML
INJECTION, SOLUTION INTRAVENOUS
Status: COMPLETED
Start: 2022-03-23 | End: 2022-03-23

## 2022-03-23 RX ORDER — OXYCODONE HYDROCHLORIDE 5 MG/1
2.5 TABLET ORAL EVERY 4 HOURS PRN
Status: DISCONTINUED | OUTPATIENT
Start: 2022-03-23 | End: 2022-03-24 | Stop reason: HOSPADM

## 2022-03-23 RX ADMIN — ENOXAPARIN SODIUM 30 MG: 100 INJECTION SUBCUTANEOUS at 10:57

## 2022-03-23 RX ADMIN — ACETAMINOPHEN 1000 MG: 500 TABLET ORAL at 13:41

## 2022-03-23 RX ADMIN — LISINOPRIL 10 MG: 10 TABLET ORAL at 10:57

## 2022-03-23 RX ADMIN — SODIUM CHLORIDE, PRESERVATIVE FREE 10 ML: 5 INJECTION INTRAVENOUS at 10:58

## 2022-03-23 RX ADMIN — ASPIRIN 81 MG: 81 TABLET, CHEWABLE ORAL at 10:57

## 2022-03-23 RX ADMIN — SODIUM CHLORIDE, PRESERVATIVE FREE 10 ML: 5 INJECTION INTRAVENOUS at 21:30

## 2022-03-23 RX ADMIN — Medication 600 MG: at 13:42

## 2022-03-23 RX ADMIN — DEXTROSE AND SODIUM CHLORIDE: 5; 450 INJECTION, SOLUTION INTRAVENOUS at 04:44

## 2022-03-23 RX ADMIN — GABAPENTIN 100 MG: 100 CAPSULE ORAL at 04:25

## 2022-03-23 RX ADMIN — OXYCODONE 2.5 MG: 5 TABLET ORAL at 20:17

## 2022-03-23 RX ADMIN — ACETAMINOPHEN 1000 MG: 500 TABLET ORAL at 05:58

## 2022-03-23 RX ADMIN — GABAPENTIN 100 MG: 100 CAPSULE ORAL at 21:28

## 2022-03-23 RX ADMIN — OXYCODONE 2.5 MG: 5 TABLET ORAL at 10:56

## 2022-03-23 RX ADMIN — ACETAMINOPHEN 1000 MG: 500 TABLET ORAL at 21:28

## 2022-03-23 RX ADMIN — ENOXAPARIN SODIUM 30 MG: 100 INJECTION SUBCUTANEOUS at 21:28

## 2022-03-23 RX ADMIN — LEVOTHYROXINE SODIUM 88 MCG: 88 TABLET ORAL at 13:42

## 2022-03-23 RX ADMIN — DEXTROSE AND SODIUM CHLORIDE: 5; .45 INJECTION, SOLUTION INTRAVENOUS at 04:44

## 2022-03-23 RX ADMIN — GABAPENTIN 100 MG: 100 CAPSULE ORAL at 13:41

## 2022-03-23 ASSESSMENT — PAIN DESCRIPTION - FREQUENCY
FREQUENCY: CONTINUOUS
FREQUENCY: CONTINUOUS

## 2022-03-23 ASSESSMENT — PAIN - FUNCTIONAL ASSESSMENT: PAIN_FUNCTIONAL_ASSESSMENT: ACTIVITIES ARE NOT PREVENTED

## 2022-03-23 ASSESSMENT — PAIN DESCRIPTION - PAIN TYPE
TYPE: ACUTE PAIN

## 2022-03-23 ASSESSMENT — PAIN SCALES - GENERAL
PAINLEVEL_OUTOF10: 7
PAINLEVEL_OUTOF10: 10
PAINLEVEL_OUTOF10: 8
PAINLEVEL_OUTOF10: 7
PAINLEVEL_OUTOF10: 10

## 2022-03-23 ASSESSMENT — PAIN DESCRIPTION - LOCATION
LOCATION: HEAD
LOCATION: HEAD;NECK
LOCATION: HEAD
LOCATION: HEAD;NECK

## 2022-03-23 ASSESSMENT — PAIN DESCRIPTION - DESCRIPTORS
DESCRIPTORS: ACHING
DESCRIPTORS: ACHING;HEADACHE
DESCRIPTORS: ACHING

## 2022-03-23 NOTE — PROGRESS NOTES
Physical Therapy    Facility/Department: [facility] 4B STEPDOWN  Initial Assessment    NAME: Chris Beard  : 1945  MRN: [id number]    Date of Service: 3/23/2022  Chief Complaint   Patient presents with    Neck Pain     Mechancial fall yesterday, has C1 C2 fracture. In C-Collar     Discharge Recommendations:  Patient would benefit from continued therapy after discharge   Assessment   Body structures, Functions, Activity limitations: Decreased functional mobility ; Decreased strength;Decreased endurance  Assessment: the pt ambulated 50 ft with a RW x CGA. She moved slowly but ambulated in a fairly safe manner. She could benefit from a continuation of PT for gait and strengthening following her DC  Prognosis: Good  Decision Making: Medium Complexity  PT Education: Goals;PT Role;Plan of Care  REQUIRES PT FOLLOW UP: Yes  Activity Tolerance  Activity Tolerance: Patient limited by endurance; Patient limited by fatigue       Patient Diagnosis(es): The primary encounter diagnosis was Closed unstable burst fracture of first cervical vertebra, initial encounter ([facility] [address] Diagnoses [facility] and Closed nondisplaced fracture of first cervical vertebra, unspecified fracture morphology, initial encounter [facility]) were also pertinent to this visit. has a past medical history of Arthritis, Edema, Hyperlipidemia, Hypertension, Hypothyroidism, Kidney stones, Osteoarthritis, Thyroid disease, and Unspecified transient cerebral ischemia. has a past surgical history that includes Lithotripsy; Knee arthroscopy (Left);  section; Tonsillectomy; Appendectomy; Hammer toe surgery; Cataract removal; Corneal transplant; joint replacement (Left, 10/15/14); Finger surgery (Left, 2018); Shoulder Arthroplasty (Right, 2019); and REPLACEMENT SHOULDER TOTAL (Right, 2019).     Restrictions  Restrictions/Precautions  Restrictions/Precautions: Fall Risk  Required Braces or Orthoses?: No  Required Braces or Orthoses  Cervical: c-collar  Position Activity Restriction  Other position/activity restrictions:  Up with assistance  Vision/Hearing  Vision: Impaired  Vision Exceptions: Wears glasses at all times  Hearing: Within functional limits     Subjective  General  Patient assessed for rehabilitation services?: Yes  Family / Caregiver Present: No  Follows Commands: Within Functional Limits  Subjective  Subjective: RN and pt agreeable to PT eval  Pain Screening  Patient Currently in Pain: Yes  Pain Assessment  Pain Assessment: 0-10  Pain Level: 8  Pain Location: Head  Vital Signs  Patient Currently in Pain: Yes     Orientation  Orientation  Overall Orientation Status: Within Normal Limits  Social/Functional History  Social/Functional History  Lives With: Son  Type of Home: House  Home Layout: One level  Home Access: Stairs to enter without rails  Entrance Stairs - Number of Steps: one step  Bathroom Shower/Tub: Tub/Shower unit  Bathroom Toilet: Handicap height  Bathroom Equipment:  (pt reports none)  Bathroom Accessibility: Accessible  Home Equipment: 4 wheeled walker  Receives Help From: Family (Pt reports supportive son and dtr, pt states both son/dtr work full time and pt would need to discuss 24hr assist with them)  ADL Assistance: Independent  Homemaking Assistance: Independent  Homemaking Responsibilities: Yes  Meal Prep Responsibility: Primary  Laundry Responsibility: No (son performs)  Cleaning Responsibility: Primary  Shopping Responsibility: Primary  Health Care Management: Primary  Ambulation Assistance: Independent  Transfer Assistance: Independent  Active : Yes  Mode of Transportation: Car  Occupation: Full time employment  Type of occupation:   Leisure & Hobbies: Enjoys going to the [facility] and [facility] on her tablet  Cognition      Objective     AROM RLE (degrees)  RLE AROM: WFL  AROM LLE (degrees)  LLE AROM : WNL  AROM RUE (degrees)  RUE AROM : WFL  AROM LUE (degrees)  LUE AROM : WFL  Strength RLE  Strength RLE: WFL  Strength LLE  Strength LLE: WFL  Strength RUE  Strength RUE: WFL  Strength LUE  Strength LUE: WFL     Sensation  Overall Sensation Status: WFL  Bed mobility  Supine to Sit: Minimal assistance  Sit to Supine: Minimal assistance  Scooting: Minimal assistance  Transfers  Sit to Stand: Minimal Assistance  Stand to sit: Minimal Assistance  Ambulation  Ambulation?: Yes  Ambulation 1  Surface: level tile  Device: Rolling Walker  Assistance: Contact guard assistance  Distance: amb 50 ft with a RW x CGA     Balance  Posture: Good  Sitting - Static: Fair  Sitting - Dynamic: Poor  Standing - Static: Fair  Standing - Dynamic: Fair        Plan   Plan  Times per week: 5-6x wk  Current Treatment Recommendations: Strengthening,Functional Mobility Training,Gait Training,Safety Education & Training,Endurance Training,Stair training  Safety Devices  Type of devices: Nurse notified,Left in chair,Call light within reach    G-Code     OutComes Score    AM-PAC Score  AM-PAC Inpatient Mobility Raw Score : 17 (03/23/22 1113)  AM-PAC Inpatient T-Scale Score : 42.13 (03/23/22 1113)  Mobility Inpatient CMS 0-100% Score: 50.57 (03/23/22 1113)  Mobility Inpatient CMS G-Code Modifier : CK (03/23/22 1113)        Goals  Short term goals  Time Frame for Short term goals: 10 visits  Short term goal 1: transfers with SBA  Short term goal 2: amb 150 ft with a RW x SBA  Short term goal 3: ascend/descend 4  steps with SBA  Short term goal 4: to be independent with bed mobility  Patient Goals   Patient goals : Return home    Therapy Time   Individual Concurrent Group Co-treatment   Time In 0915         Time Out 0950         Minutes 35             1 of [facility], PT

## 2022-03-23 NOTE — ED NOTES
Report given to [facility].  Will draw pt morning labs and send her up to the floor       Tj Muñoz RN  03/23/22 1373

## 2022-03-23 NOTE — PROGRESS NOTES
Manuelito Sarmiento was evaluated today and a DME order was entered for a wheeled walker because she requires this to successfully complete daily living tasks of ambulating. A wheeled walker is necessary due to the patient's unsteady gait, upper body weakness, and inability to  an ambulation device; and she can ambulate only by pushing a walker instead of a lesser assistive device such as a cane, crutch, or standard walker. The need for this equipment was discussed with the patient and she understands and is in agreement. Aman Gould was evaluated today and a DME order was entered for a transfer tub bench because the patient requires this to successfully complete daily living tasks of bathing, grooming and hygiene. A transfer tub bench is necessary due to the patient's unsteady gait. The need for this equipment was discussed with the patient. They understand and are in agreement. Attending Note      I have reviewed the above TECSS note(s) and I either performed the key elements of the medical history and physical exam or was present with the resident when the key elements of the medical history and physical exam were performed. I have discussed the findings, established the care plan and recommendations with Resident, MUSA RN, bedside nurse.     Irvin Rangel MD  3/23/2022  1:33 PM

## 2022-03-23 NOTE — PROGRESS NOTES
Speech Language Pathology  [address]  Speech Language Pathology    SPEECH/COGNITIVE ASSESSMENT    NO LOC,[facility]I OR CVA/TIA - ST TO DEFER AT THIS TIME      Date: 3/23/2022  Patient Name: Tamanna Kirk  YOB: 1945   AGE: 64 y.o. MRN: [id number]        PT NOT SEEN FOR SPEECH OR COGNITIVE ASSESSMENT AT THIS TIME AS NO LOC, [facility] OR CVA/TIA IS DOCUMENTED. ST TO DEFER AT THIS TIME. PLEASE RE-COSULT AS NEEDED.       Marbella Hameed, SLP  3/23/2022  6:43 AM

## 2022-03-23 NOTE — DISCHARGE INSTR - COC
Continuity of Care Form    Patient Name: Sydney Oliveira   :  1945  MRN:  [id number]    Admit date:  3/22/2022  Discharge date:  ***    Code Status Order: Full Code   Advance Directives:      Admitting Physician:  Maru Caldwell MD  PCP: Karly Beard MD    Discharging Nurse: [facility] Unit/Room#: 4232/3551-13  Discharging Unit Phone Number: ***    Emergency Contact:   Extended Emergency Contact Information  Primary Emergency Contact: Anitha Rasheed [address] Phone: [phone]  Relation: Child  Hearing or visual needs: None  Other needs: None  Preferred language: [place]   needed? No  Secondary Emergency Contact: Jeanine Moore [address] Phone: [phone]  Mobile Phone: [phone]  Relation: Child  Hearing or visual needs: None  Other needs: None  Preferred language: English   needed?  No    Past Surgical History:  Past Surgical History:   Procedure Laterality Date    APPENDECTOMY      CATARACT REMOVAL       SECTION      x4    CORNEAL TRANSPLANT      bilat    FINGER SURGERY Left 2018    INDEX FINGER    HAMMER TOE SURGERY      right and left    JOINT REPLACEMENT Left 10/15/14    TKA    KNEE ARTHROSCOPY Left     LITHOTRIPSY      REPLACEMENT SHOULDER TOTAL Right 2019    SHOULDER TOTAL ARTHROPLASTY, OPEN PARTIAL CLAVICULECTOMY performed by Charles Leung MD at [address] Right 2019    Dr. Sherri Kwon         Immunization History:   Immunization History   Administered Date(s) Administered    COVID-19, Pfizer Purple top, DILUTE for use, 12+ yrs, 30mcg/0.3mL dose 2021, 2021    DTaP 1993    Influenza Virus Vaccine 2014, 2015    Influenza Whole 2015    Influenza, High Dose (Fluzone 65 yrs and older) 10/02/2016    Influenza, High-dose, Quadv, 65 yrs +, IM (Fluzone) 2020, 10/20/2021    Influenza, Triv, 3 Years and older, IM, PF (Afluria 5yrs and older) 09/19/2017    Influenza, Triv, inactivated, subunit, adjuvanted, IM (Fluad 65 yrs and older) 09/24/2018, 09/26/2019    Pneumococcal Conjugate 13-valent (Rdggwzj13) 10/02/2016    Pneumococcal Conjugate 7-valent (Prevnar7) 10/28/2011    Pneumococcal Polysaccharide (Dupxyfikq04) 12/19/2017    Tdap (Boostrix, Adacel) 11/04/2016    Zoster Live (Zostavax) 09/23/2015    Zoster Recombinant (Shingrix) 07/22/2021, 09/23/2021       Active Problems:  Patient Active Problem List   Diagnosis Code    Transient cerebral ischemia G45.9    Essential hypertension I10    Hyperlipidemia E78.5    Primary osteoarthritis involving multiple joints M89.49    Hypothyroidism E03.9    Primary osteoarthritis of right shoulder M19.011    Morbidly obese (HCC) E66.01    Skin rash R21    Cellulitis of buttock L03.317    Hair loss L65.9    Primary hypertension I10    Ric fracture with nonunion, subsequent encounter S12. 01XK       Isolation/Infection:   Isolation            No Isolation          Patient Infection Status       None to display            Nurse Assessment:  Last Vital Signs: /78   Pulse 67   Temp 97.7 °F (36.5 °C) (Oral)   Resp 18   Ht 4' 7\" (1.397 m)   Wt 167 lb (75.8 kg)   SpO2 97%   BMI 38.81 kg/m²     Last documented pain score (0-10 scale): Pain Level: 8  Last Weight:   Wt Readings from Last 1 Encounters:   03/22/22 167 lb (75.8 kg)     Mental Status:  oriented and alert    IV Access:  - None    Nursing Mobility/ADLs:  Walking   Assisted  Transfer  Assisted  Bathing  Assisted  Dressing  Assisted  Toileting  Assisted  Feeding  Independent  Med Admin  Independent  Med Delivery   whole    Wound Care Documentation and Therapy:  Incision Toe (Comment  which one) Left (Active)   Number of days:        Incision Toe (Comment  which one) Right (Active)   Number of days:        Incision 10/15/14 Knee Left (Active)   Number of days: 7005        Elimination:  Continence:    Bowel: {YES / 19727}  Bladder: {YES /07136}  Urinary Catheter: None   Colostomy/Ileostomy/Ileal Conduit: {OF:04565}       Date of Last BM: 3/21    Intake/Output Summary (Last 24 hours) at 3/23/2022 1204  Last data filed at 3/23/2022 0631  Gross per 24 hour   Intake 160.9 ml   Output --   Net 160.9 ml     I/O last 3 completed shifts: In: 160.9 [I.V.:160.9]  Out: -     Safety Concerns: At Risk for Falls    Impairments/Disabilities:      Vision    Nutrition Therapy:  Current Nutrition Therapy:   - Oral Diet:  General    Routes of Feeding: Oral  Liquids: {Slp liquid thickness:39031}  Daily Fluid Restriction: no  Last Modified Barium Swallow with Video (Video Swallowing Test): not done    Treatments at the Time of Hospital Discharge:   Respiratory Treatments: ***  Oxygen Therapy:  is not on home oxygen therapy.   Ventilator:    - No ventilator support    Rehab Therapies: {THERAPEUTIC INTERVENTION:8711234276}  Weight Bearing Status/Restrictions: No weight bearing restrictions  Other Medical Equipment (for information only, NOT a DME order): wheeled walker, shower chair  Other Treatments: Aspen collar, ok to have off in bed with HOB less than 45 degrees    Patient's personal belongings (please select all that are sent with patient):  Glasses    RN SIGNATURE:  Electronically signed by Olga Epstein RN on 3/24/22 at 11:01 AM EDT    CASE MANAGEMENT/SOCIAL WORK SECTION    Inpatient Status Date: 3/22/22    Readmission Risk Assessment Score:  Readmission Risk              Risk of Unplanned Readmission:  10           Discharging to Facility/ Agency   Name:   [address] [place] [address] Details  FAX          [address] [place] [id number]       Phone: [phone]       Fax: [phone]          Dialysis Facility (if applicable)   Name:  Address:  Dialysis Schedule:  Phone:  Fax:    / signature: Electronically signed by Tutu Rashid RN on 3/23/22 at 4:40 PM EDT    PHYSICIAN SECTION    Prognosis: Good    Condition at Discharge: Stable    Rehab Potential (if transferring to Rehab): {Prognosis:6692347529}    Recommended Labs or Other Treatments After Discharge: ***    Physician Certification: I certify the above information and transfer of Antonio Ayoub  is necessary for the continuing treatment of the diagnosis listed and that she requires [address] for less 30 days.      Update Admission H&P: No change in H&P    PHYSICIAN SIGNATURE:  Electronically signed by Tenzin Marsh MD on 3/23/22 at 1:34 PM EDT

## 2022-03-23 NOTE — PROGRESS NOTES
PROGRESS NOTE    PATIENT NAME: Tobias Krueger Dr RECORD NO. [id number]  DATE: 3/23/2022  SURGEON: Dr. Gambino Trey: Kathleen Bear MD    HD: # 1    ASSESSMENT    Patient Active Problem List   Diagnosis    Transient cerebral ischemia    Essential hypertension    Hyperlipidemia    Primary osteoarthritis involving multiple joints    Hypothyroidism    Primary osteoarthritis of right shoulder    Morbidly obese ([address]    Skin rash    Cellulitis of buttock    Hair loss    Primary hypertension    Ric fracture with nonunion, subsequent encounter       MEDICAL DECISION MAKING AND PLAN    N: Pain and nausea control as needed. MMPT: tylenol, roxicodone, gabapentin   Neurosurgery recommendations: non-operative management and 6 week repeat XR. Endovascular: follow up OP 4-6 weeks  C: HDS. Home meds: hctz, lisinopril  P: Encourage IS, deep breathing, and cough. FEN: Replete lytes PRN. IV Fluids: TKO  GI: Diet: patient reports choking with some liquids, pending speech eval  R: Voiding. Monitor I/O's.  H: VTE ppx: will start AC  I: Afebrile. Monitor CBC. E: levothyroxine  M: PT/OT. Cervical collar to be worn when head of bead above 45 degrees or out of bed, can be off to eat. LDA: PIV    Dispo: floor. Chief Complaint: Fall  [address] had NAEO, seen at bedside this AM. Cervical collar in place, patient reports it is uncomfortable. Reports choking with some liquids. Have not started diet. Pain well controlled. OBJECTIVE  VITALS: Temp: Temp: 97.7 °F (36.5 °C)Temp  Av.3 °F (36.8 °C)  Min: 97.7 °F (36.5 °C)  Max: 98.7 °F (33.5 °C) BP Systolic (19XFG), MLS:607 , Min:116 , AJT:266   Diastolic (10ARQ), UYD:02, Min:61, Max:95   Pulse Pulse  Av.3  Min: 67  Max: 91 Resp Resp  Avg: 15.1  Min: 11  Max: 18 Pulse ox SpO2  Av.5 %  Min: 94 %  Max: 100 %  GENERAL: No acute distress, alert and oriented  HEENT: normocephalic, atraumatic.  Anicteric sclerae, normal conjunctiva. NECK: Aspen collar in place  CV: Regular rate, no murmurs, rubs, or gallops  Pulm: CTAB, no wheezes, rhonchi, rales  ABD: Soft, nontender, nondistended, no rebound or guarding  Neuro: No focal deficits  MSK:  no deformities or injuries, moves all extremities equally  Skin: Warm, dry, cap refill <2  Psych: Normal mood      I/O last 3 completed shifts: In: 160.9 [I.V.:160.9]  Out: -     Drain/tube output: In: 160.9 [I.V.:160.9]  Out: -     LAB:  CBC:   Recent Labs     03/22/22  1050 03/23/22  0557   WBC 9.7 8.7   HGB 12.8 12.6   HCT 40.3 37.3   .5 98.2    168     BMP:   Recent Labs     03/22/22  1050 03/23/22  0557    138   K 3.6* 3.4*    99   CO2 29 29   BUN 25* 18   CREATININE 0.57 0.57   GLUCOSE 120* 156*     COAGS:   Recent Labs     03/22/22  1050   APTT 28.1   PROT 7.1   INR 1.1       RADIOLOGY:  XR CERVICAL SPINE (2-3 VIEWS)    Result Date: 3/22/2022  EXAMINATION: 4 XRAY VIEWS OF THE CERVICAL SPINE 3/22/2022 9:49 pm COMPARISON: CT cervical spine 03/22/2022. HISTORY: ORDERING SYSTEM PROVIDED HISTORY: standing or 90 deg seated lateral and open mouth odontoid TECHNOLOGIST PROVIDED HISTORY: standing or 90 deg seated lateral and open mouth odontoid FINDINGS: Evaluation limited by osseous demineralization. Grade 1 anterolisthesis of C3 on C4. The vertebral body heights are grossly preserved. The known odontoid and C1 fractures are not well visualized. The atlantoaxial junction is grossly intact. The paravertebral soft tissues are unremarkable. Nonvisualization of the known C1 and odontoid fractures.      CT Head WO Contrast    Result Date: 3/22/2022  EXAMINATION: CT OF THE HEAD WITHOUT CONTRAST  3/22/2022 11:15 am TECHNIQUE: CT of the head was performed without the administration of intravenous contrast. Dose modulation, iterative reconstruction, and/or weight based adjustment of the mA/kV was utilized to reduce the radiation dose to as low as reasonably achievable. COMPARISON: None. HISTORY: ORDERING SYSTEM PROVIDED HISTORY: fell and hit head recently TECHNOLOGIST PROVIDED HISTORY: fell and hit head recently Decision Support Exception - unselect if not a suspected or confirmed emergency medical condition->Emergency Medical Condition (MA) FINDINGS: BRAIN/VENTRICLES: There is no acute intracranial hemorrhage, mass effect or midline shift. No abnormal extra-axial fluid collection. The gray-white differentiation is maintained without evidence of an acute infarct. Focal old lacunar infarct is noted in the left caudate nucleus. There is no evidence of hydrocephalus. ORBITS: The bilateral globes are intact. SINUSES: Minimal mucoperiosteal thickening of the left maxillary sinus and bilateral ethmoid air cells is seen. SOFT TISSUES/SKULL:  No acute abnormality of the visualized skull or soft tissues. 06/17/2020     CT CERVICAL SPINE WO CONTRAST    Result Date: 3/22/2022  EXAMINATION: CT OF THE CERVICAL SPINE WITHOUT CONTRAST 3/22/2022 9:35 am TECHNIQUE: CT of the cervical spine was performed without the administration of intravenous contrast. Multiplanar reformatted images are provided for review. Dose modulation, iterative reconstruction, and/or weight based adjustment of the mA/kV was utilized to reduce the radiation dose to as low as reasonably achievable. COMPARISON: None. HISTORY: ORDERING SYSTEM PROVIDED HISTORY: Acute neck pain FINDINGS: BONES/ALIGNMENT: There are nondisplaced fractures of the C1 ring, involving the midline anterior arch and bilateral posterior arches (Ric burst fracture, type 3). There is also a type 2 odontoid fracture with minimal associated angulation. There is mild anterolisthesis at C3-C4, measuring 0.3 cm. There is mild anterolisthesis at C4-C5, measuring 0.3 cm. There is anterolisthesis at C7-T1, measuring 0.4 cm. This is likely due to severe facet arthropathy at multiple levels.   No additional fractures or facet subluxation is observed. DEGENERATIVE CHANGES: There is severe multilevel disc space narrowing, endplate spurring, and facet arthropathy. There is moderate multilevel neural foraminal narrowing. Large bony spurs at the C6-C7 level results in at least moderate spinal canal narrowing. SOFT TISSUES: There is mild prevertebral soft tissue swelling at the level of the odontoid process, measuring 0.8 cm. 1.  Nondisplaced Ric burst fracture of the C1 atlas, involving midline anterior and bilateral posterior arches. Consider MRI to assess for soft tissue injury. 2.  Type 2 odontoid fracture with mild apex ventral angulation. 3.  Mild prevertebral soft tissue swelling at the level of the odontoid process. 4.  Mild anterolisthesis at C3-C4, C4-C5, and C7-T1, likely degenerative. 5.  Severe multilevel degenerative changes with at least moderate spinal canal narrowing at C6-C7. Critical results were called by Dr. Brenda Gonzalez to Dr. Dumont Later On 3/22/2022 at 10:11. MRI CERVICAL SPINE WO CONTRAST    Result Date: 3/23/2022  EXAMINATION: MRI OF THE CERVICAL SPINE WITHOUT CONTRAST 3/22/2022 6:09 pm TECHNIQUE: Multiplanar multisequence MRI of the cervical spine was performed without the administration of intravenous contrast. COMPARISON: CT 03/22/2022 HISTORY: ORDERING SYSTEM PROVIDED HISTORY: C1/2 fx TECHNOLOGIST PROVIDED HISTORY: C1/2 fx Decision Support Exception - unselect if not a suspected or confirmed emergency medical condition->Emergency Medical Condition (MA) Reason for Exam: C1/2 fx FINDINGS: BONES/ALIGNMENT: Abnormal edema involving the base of the odontoid consistent with type 2 fracture. Edema involving the C1-C2 articulation predental space likely corresponding to the C1-C2 injury. No distraction of the fracture fragments identified. No abnormal signal identified along the transverse ligament on the axial images provided. Otherwise no abnormal bone marrow edema elsewhere.  SPINAL CORD: No cord contusion or hemorrhage identified. There is cord flattening at multiple levels likely due to the canal narrowing due to multilevel spondylosis. This is most notably at C5-C6, C6-C7, C7-T1. SOFT TISSUES: There is prevertebral thickeningextending from the clivus to C4 due to prevertebral edema. Additionally, there is mild edema within the posterior soft tissues extending from the occiput to C5. C2-C3: Moderate to severe disc space disease. Moderate circumferential disc bulge. Severe facet arthropathy and ligamentum flavum hypertrophy identified. There is moderate bilateral foraminal narrowing and moderate canal narrowing. C3-C4: Moderate disc space disease. Diffuse osteophyte complex formation and uncovertebral spurring. Mild to moderate bilateral foraminal narrowing. No central canal narrowing. Mild facet arthropathy. C4-C5: Anterolisthesis 2-3 mm. Likely related to the advanced facet arthropathy. Moderate disc space disease identified with diffuse disc osteophyte complex formation and uncovertebral spurring. Moderate to severe right-sided and moderate left-sided foraminal narrowing. Moderate canal narrowing. C5-C6: Severe disc space disease. Diffuse osteophyte complex formation and uncovertebral spurring identified. There is severe right and moderate to severe left foraminal narrowing. Moderate canal narrowing. Mild flattening of the cord at this level. C6-C7: Severe disc space disease. Disc extrusion extending caudally. There is moderate to severe central canal narrowing with cord flattening. There is severe right and moderate left foraminal narrowing. C7-T1: Severe disc space disease. Anterolisthesis measuring 3 mm identified. Slight uncovering posterior disc identified at this level. Moderate canal narrowing. Moderate to severe bilateral foraminal narrowing. Greatest on the left. Otherwise moderate to severe facet arthropathy identified.      Type 2 odontoid fractureinvolving the C1 better seen on recent CT. Negative for cord compression or contusion Mild cord flattening due to multilevel severe degenerative changes. Prevertebral and posterior paraspinal edema without definite areas of high-grade ligamentous disruption or injury identified. XR CHEST PORTABLE    Result Date: 3/22/2022  EXAMINATION: ONE XRAY VIEW OF THE CHEST 3/22/2022 11:15 am COMPARISON: May 8, 2019 HISTORY: ORDERING SYSTEM PROVIDED HISTORY: admission TECHNOLOGIST PROVIDED HISTORY: admission FINDINGS: Stable cardiomediastinal silhouette. No significant pulmonary edema. No convincing lung consolidation or infiltrate. No pleural effusion or pneumothorax. There has been interval left shoulder total arthroplasty which appears intact. No acute cardiopulmonary process     CTA NECK W CONTRAST    Result Date: 3/22/2022  EXAMINATION: CTA OF THE NECK 3/22/2022 4:44 pm TECHNIQUE: CTA of the neck was performed with the administration of intravenous contrast. Multiplanar reformatted images are provided for review. MIP images are provided for review. Stenosis of the internal carotid arteries measured using NASCET criteria. Dose modulation, iterative reconstruction, and/or weight based adjustment of the mA/kV was utilized to reduce the radiation dose to as low as reasonably achievable. COMPARISON: Same-day CT cervical spine HISTORY: ORDERING SYSTEM PROVIDED HISTORY: trauma TECHNOLOGIST PROVIDED HISTORY: trauma Decision Support Exception - unselect if not a suspected or confirmed emergency medical condition->Emergency Medical Condition (MA) FINDINGS: AORTIC ARCH/ARCH VESSELS: No dissection or arterial injury. No significant stenosis of the brachiocephalic or subclavian arteries. CAROTID ARTERIES: No dissection, arterial injury, or hemodynamically significant stenosis by NASCET criteria. VERTEBRAL ARTERIES: No dissection or significant stenosis.   There is intimal irregularity in the right vertebral artery the most pronounced at C5-6. SOFT TISSUES: The lung apices are clear. No cervical or superior mediastinal lymphadenopathy. The larynx and pharynx are unremarkable. No acute abnormality of the salivary and thyroid glands. BONES: Please see same day CT cervical spine for further detail in the multiple cervical fractures present. 1. Intimal irregularity in the right vertebral artery. This could be atherosclerotic in nature, but grade 1 blunt cerebrovascular injury cannot be excluded. No significant stenosis. 2. No significant stenosis or occlusion of the cervical vasculature. CT CHEST ABDOMEN PELVIS W CONTRAST    Result Date: 3/22/2022  EXAMINATION: CT OF THE CHEST, ABDOMEN, AND PELVIS WITH CONTRAST 3/22/2022 4:44 pm TECHNIQUE: CT of the chest, abdomen and pelvis was performed with the administration of intravenous contrast. Multiplanar reformatted images are provided for review. Dose modulation, iterative reconstruction, and/or weight based adjustment of the mA/kV was utilized to reduce the radiation dose to as low as reasonably achievable. COMPARISON: CT cervical spine earlier the same day. HISTORY: ORDERING SYSTEM PROVIDED HISTORY: trauma TECHNOLOGIST PROVIDED HISTORY: trauma Decision Support Exception - unselect if not a suspected or confirmed emergency medical condition->Emergency Medical Condition (MA) C1 burst fracture. Type 2 odontoid fracture with angulation. Patient tripped over an appliance cord yesterday striking the side of her head. 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 No acute aortic abnormality. No mediastinal adenopathy. Heart is mildly enlarged with mild coronary artery calcification. Trace pericardial fluid without epicardial adenopathy. Small hiatal hernia. Lungs/pleura: Basilar atelectasis is noted. No extrapleural air or appreciable effusion. No suspicious pulmonary nodules. Tracheobronchial tree is patent.  Soft Tissues/Bones: Moderate dextroscoliotic curvature thoracic spine. Patient has moderately severe spondylosis of the thoracic spine with sequential non marginal bridging osteophytes. Right shoulder arthroplasty. Abdomen/Pelvis: Organs: Liver is mildly fatty infiltrated without focal mass or ductal dilatation. Gallbladder demonstrates a Phrygian cap and is mildly dilated. Within the Phrygian cap of the gallbladder is lamellated gallstone of approximately 13 mm. Spleen, pancreas, adrenals, and kidneys demonstrate no acute abnormality. No ureteral dilatation. GI/Bowel: No free fluid, free air, bowel obstruction or bowel wall thickening is noted. Patient has a complex periumbilical hernia of 6 cm size containing the tip of a bowel loop and mostly fat without strangulation. Moderate-sized diverticula are scattered throughout the colon without evidence of acute diverticulitis. Pelvis: Appendix is visualized. No appendicitis. Uterus is abnormal in appearance. The uterus is heterogeneous and the endometrium is abnormally thickened at 18 mm. Bladder is unremarkable. Patient has a left fat containing inguinal hernia of 2.5 cm without strangulation. No free pelvic fluid, pelvic or inguinal adenopathy is seen. Peritoneum/Retroperitoneum: Scattered calcified plaque is noted. No aneurysm or dissection. No retroperitoneal mass, retroperitoneal or mesenteric adenopathy is noted. Bones/Soft Tissues: Arthritic changes are present in the hips, and symphysis pubis. Grade 2 anterolisthesis L4 upon L5 is noted with severe canal stenosis L4-L5 and severe facet arthropathy lower lumbar region. Retrolisthesis L1 on L2 is noted with canal stenosis, moderate at this level. Scoliotic curvature is noted with significant spondylosis. Significant levoscoliotic curvature with rotation. No acute fracture. 1. 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 Coronary artery disease. No effusion or extrapleural air. Extensive at least moderate spondylosis thoracic spine. 2.  CT abdomen and pelvis: No bowel obstruction. Patient has a dilated gallbladder with gallstone. Periumbilical hernia containing fat and bowel without strangulation. Abnormal appearing uterus, enlarged for age with thickened endometrium. Left inguinal fat containing hernia without strangulation. Severe scoliotic curvature in the lumbar spine with multilevel advanced degenerative change, neural foraminal and canal stenosis fully described above without evidence of acute fracture. CT LUMBAR SPINE TRAUMA RECONSTRUCTION    Result Date: 3/22/2022  EXAMINATION: CT OF THE LUMBAR SPINE WITHOUT CONTRAST  3/22/2022 TECHNIQUE: CT of the lumbar spine was performed without the administration of intravenous contrast. Multiplanar reformatted images are provided for review. Adjustment of mA and/or kV according to patient size was utilized. Dose modulation, iterative reconstruction, and/or weight based adjustment of the mA/kV was utilized to reduce the radiation dose to as low as reasonably achievable. COMPARISON: Lumbar radiograph series 06/25/2014 HISTORY: Low back pain following trauma FINDINGS: BONES/ALIGNMENT: Chronic grade 2 anterolisthesis L4 on L5 and grade 1 anterolisthesis L5 on S1. Leftward listhesis L4 on L5. The vertebral body heights are maintained. No osseous destructive lesion is seen. Lower lumbar levorotatory scoliosis as seen previously. DEGENERATIVE CHANGES: Diffuse moderately severe degenerative changes of the lumbar spine. SOFT TISSUES/RETROPERITONEUM: No paraspinal mass is seen. 1. No acute lumbar fracture evident. 2. Chronic grade 2 anterolisthesis L4 on L5 and grade 1 anterolisthesis L5 on S1. 3. Leftward listhesis L4 on L5. 4. Advanced degenerative changes appear similar to prior study, seen throughout the lumbar spine. 5. Lower lumbar levorotatory scoliosis.  If pain persists or worsens, then additional characterization with MRI lumbar spine may be indicated. CT THORACIC SPINE TRAUMA RECONSTRUCTION    Result Date: 3/22/2022  EXAMINATION: CT OF THE THORACIC SPINE WITHOUT CONTRAST  3/22/2022 1:44 pm: TECHNIQUE: CT of the thoracic spine was performed without the administration of intravenous contrast. Multiplanar reformatted images are provided for review. Dose modulation, iterative reconstruction, and/or weight based adjustment of the mA/kV was utilized to reduce the radiation dose to as low as reasonably achievable. COMPARISON: None. HISTORY: ORDERING SYSTEM PROVIDED HISTORY: trauma TECHNOLOGIST PROVIDED HISTORY: trauma FINDINGS: BONES/ALIGNMENT: Coronal images demonstrate dextroscoliosis within the thoracic region. No traumatic malalignment is present. .  The vertebral body heights are maintained. No osseous destructive lesion is seen. DEGENERATIVE CHANGES: No gross spinal canal stenosis or bony neural foraminal narrowing of the thoracic spine. Multilevel degenerative changes are present throughout the thoracic spine. SOFT TISSUES: No paraspinal mass is seen. Bibasilar opacities are present, addressed on the dedicated CT scan of the chest.  Coronary arterial calcifications are present. No evidence of an acute fracture or traumatic malalignment involving the thoracic spine. Lossie Oppenheim, DO  3/23/22, 7:29 AM         Attending Note      I have reviewed the above [facility] note(s) and I either performed the key elements of the medical history and physical exam or was present with the trauma resident when the key elements of the medical history and physical exam were performed. I have discussed the findings, established the care plan and recommendations with the trauma team.  MRI reviewed. No paraesthesias. Awaiting NS plan.     Xochitl Moscoso MD  3/23/2022  7:53 AM

## 2022-03-23 NOTE — PROGRESS NOTES
Endovascular Neurosurgery Progress Note    SUBJECTIVE:   No acute events overnight     Review of Systems:  CONSTITUTIONAL:  negative for fevers, chills, fatigue and malaise    EYES:  negative for double vision, blurred vision and photophobia     HEENT:  negative for tinnitus, epistaxis and sore throat    RESPIRATORY:  negative for cough, shortness of breath, wheezing    CARDIOVASCULAR:  negative for chest pain, palpitations, syncope, edema    GASTROINTESTINAL:  negative for nausea, vomiting    GENITOURINARY:  negative for incontinence    MUSCULOSKELETAL:  negative for neck or back pain    NEUROLOGICAL:  Negative for weakness and tingling  negative for headaches and dizziness    PSYCHIATRIC:  negative for anxiety      Review of systems otherwise negative. OBJECTIVE:     Vitals:    22 1517   BP: 113/63   Pulse: 74   Resp: 13   Temp: 98.4 °F (36.9 °C)   SpO2: 96%        General:  Gen: normal habitus, NAD  HEENT: NCAT, mucosa moist  Cvs: RRR, S1 S2 normal  Resp: symmetric unlabored breathing  Abd: s/nd/nt  Ext: no edema  Skin: no lesions seen, warm and dry    Neuro:  Gen: awake and alert, oriented x3. Lang/speech: no aphasia or dysarthria. Follows commands. CN: PERRL, EOMI, VFF, V1-3 intact, face symmetric, hearing intact, shoulder shrug symmetric, tongue midline  Motor: grossly 5/5 UE and LE b/l  Sense: LT intact in all 4 ext. Coord: FTN and HTS intact b/l  DTR: deferred  Gait: narrow base gait    NIH Stroke Scale:   1a  Level of consciousness: 0 - alert; keenly responsive   1b. LOC questions:  0 - answers both questions correctly   1c. LOC commands: 0 - performs both tasks correctly   2. Best Gaze: 0 - normal   3. Visual: 0 - no visual loss   4. Facial Palsy: 0 - normal symmetric movement   5a. Motor left arm: 0 - no drift, limb holds 90 (or 45) degrees for full 10 seconds   5b. Motor right arm: 0 - no drift, limb holds 90 (or 45) degrees for full 10 seconds   6a.  Motor left le - no drift; leg holds 30 degree position for full 5 seconds   6b  Motor right le - no drift; leg holds 30 degree position for full 5 seconds   7. Limb Ataxia: 0 - absent   8. Sensory: 0 - normal; no sensory loss   9. Best Language:  0 - no aphasia, normal   10. Dysarthria: 0 - normal   11. Extinction and Inattention: 0 - no abnormality         Total:   0     MRS: 0      LABS:   Reviewed. Lab Results   Component Value Date    HGB 12.6 2022    WBC 8.7 2022     2022     2022    BUN 18 2022    CREATININE 0.57 2022    AST 23 2022    ALT 19 2022    MG 1.8 2022    APTT 28.1 2022    INR 1.1 2022      Lab Results   Component Value Date    COVID19 Not Detected 2022       RADIOLOGY:   Images were personally reviewed includin. Intimal irregularity in the right vertebral artery.  This could be   atherosclerotic in nature, but grade 1 blunt cerebrovascular injury cannot be   excluded.  No significant stenosis. 2. No significant stenosis or occlusion of the cervical vasculature. ASSESSMENT:   63 y/o F with pmh significant for Htn, dyslipidemia, osteoarthritis, thyroid disease presented after a mechanical fall. Patient is found to have type II odontoid fracture with minimal associated angulation and Ric burst fracture type III. CTA head and neck showed some questionable right vertebral artery luminal irregularity. It did not appear to be traumatic and also not symptomatic, since no focal neuro deficits. PLAN:   --No DSA or intervention recommended at this time  --C/w ASA 81 mg daily   --Follow up with Dr. Syliva Goltz in 4-6 weeks after discharge, f/up with Dr. Eriberto Hare in 3 months   --Repeat CTA head and neck in 4 weeks. Imaging to be completed prior to clinic visit. Case discussed with Dr. Eriberto Hare attending.     Neli Martinez MD  Stroke, [facility] Stroke [address] [place] [address] [address]   Electronically signed 3/23/2022 at 3:44 PM

## 2022-03-23 NOTE — ED NOTES
Pt back and forth to bed side commode with 1 assist. Pt tolerated well, no concerns at this time.  Water offered      [facility] [facility]  03/23/22 [id number]

## 2022-03-23 NOTE — ED NOTES
Pt placed on bedpan     [facility], [address] [place] [address]  03/23/22 [id number]

## 2022-03-23 NOTE — PROGRESS NOTES
Speech Language Pathology  Facility/Department: [address] STEPDOWN   CLINICAL BEDSIDE SWALLOW EVALUATION    NAME: Maite Pena  : 1945  MRN: [id number]    ADMISSION DATE: 3/22/2022  ADMITTING DIAGNOSIS: has Transient cerebral ischemia; Essential hypertension; Hyperlipidemia; Primary osteoarthritis involving multiple joints; Hypothyroidism; Primary osteoarthritis of right shoulder; Morbidly obese ([address] Skin rash; Cellulitis of buttock; Hair loss; Primary hypertension; and Ric fracture with nonunion, subsequent encounter on their problem list.    Date of Eval: 3/23/2022  Evaluating Therapist: Mei Murphy    Current Diet level:  Current Diet : NPO  Current Liquid Diet : NPO      Primary Complaint  Maite Pena is a 64 y.o. female that presented to the Emergency Department following a fall yesterday in the evening when she was unplugging her vacuum. On the fall she hit her head. There was no loss of consciousness and was not on the ground long. She went to her PCP for neck pain following the fall this morning and Dr. Damon Everett ordered a cervical spine CT. This demonstrated C1-2 fractures and she was sent to the [place] [address] emergency department for further evaluation. Currently does not have numbness or tingling. No focal neuro deficits. Was transferred to [place] [address] for neurosurgery evaluation. Fentanyl has controlled pain well. Wearing c-collar.        Pain:  Pain Assessment  Pain Assessment: 0-10  Pain Level: 8  Patient's Stated Pain Goal: No pain  Pain Type: Acute pain  Pain Location: Head  Pain Descriptors: Aching  Pain Frequency: Continuous  Clinical Progression: Gradually improving  Functional Pain Assessment: Activities are not prevented  Non-Pharmaceutical Pain Intervention(s): Ambulation/Increased Activity,Distraction,Repositioned  Response to Pain Intervention: Patient Satisfied    Reason for Referral  Maite Pena was referred for a bedside swallow evaluation to assess the efficiency of her Dysfunction  Oral Phase  Oral Phase: WFL  Oral Phase  Oral Phase - Comment: Adequate mastication and manipulation of bolus in the oral cavity. Indicators of Pharyngeal Phase Dysfunction   Pharyngeal Phase  Pharyngeal Phase: WFL  Pharyngeal Phase   Pharyngeal: No overt s/s of aspiration noted with trials of puree, nectar thick liquid, thin liquid, soft and bite-sized, and regular solid.     Prognosis  Prognosis  Prognosis for safe diet advancement: good  Individuals consulted  Consulted and agree with results and recommendations: Patient;RN    Education  Patient Education: Yes  Patient Education Response: Verbalizes understanding        Therapy Time  SLP Individual Minutes  Time In: 0915  Time Out: 7572  Minutes: 10        Completed by Seferino Weber,  Clinician  Co-signed by Blanca Larios M.S., CCC/SLP    3/23/2022 12:39 PM

## 2022-03-23 NOTE — ED NOTES
Pt asleep respirations, respiration non labored, call light with in reach. No concerns at this time.  Nurse will continue to monitor      Xenia Kowalski RN  03/23/22 6319

## 2022-03-23 NOTE — CARE COORDINATION
Transitional planning      Writer to room to discuss d/c plan, patient refusing SNF , agreeable to home care, provided list to make choices. 1525 DME orders faxed to [facility] per patient choice, follow up call to [facility], to be delivered here, given [place] specific home care list.      1600 Received call from [facility] and patient had gotten walker 4 years ago and they can only give every 5 , patient updated, referrals to [address] [place] [address] per freedom of choice. 1640 Received call from Jamal Simmons at [facility], can accept patient, primary RN and patient updated.  Phone to contact daughter Paddy Son [phone]

## 2022-03-23 NOTE — ED NOTES
Pt to bedside and from bedside commode, pt stable. No concerns at this time      Monae Marvin [facility]  03/22/22 8420

## 2022-03-23 NOTE — ED NOTES
Pt back and forth to bedside commode, x 1 assist   No signs of distress  Pt tolerated well      Tj Muñoz, RN  03/23/22 9370

## 2022-03-23 NOTE — CONSULTS
Department of Neurosurgery                                                 Reason for Consult:  C1/2 fracture  Requesting Physician:  Trauma service  Neurosurgeon:  Dr Matt Veliz From:  Patient    A/P:     Displaced Type 2 dens fracture  Anterior and posterior arch fracture    No progressive displacement on upright x-rays despite slight angulation on CT. No evident ligamentous injury deeming gross instability on MRI as well. Nonoperative management with cervical collar to be worn when head of bed above 45 degrees or when out of bed. Collar can be off to eat as well    6wks with rpt xrays    CHIEF COMPLAINT:         Fall    HISTORY OF PRESENT ILLNESS:       69-year-old female was transferred from [facility] after mechanical fall while bending over yesterday evening around 5 PM.  Patient with a history of TIA as well as joint deformities of the hand and is on aspirin at baseline. Patient stating she is having some axial neck pain that is nonradiating with no new numbness tingling or weakness of the upper or lower extremities. Does have contractures of the hands partially.     PAST MEDICAL HISTORY :       Past Medical History:        Diagnosis Date    Arthritis     osteo    Edema     Hyperlipidemia     Hypertension     Hypothyroidism     Kidney stones     Osteoarthritis     Thyroid disease     Unspecified transient cerebral ischemia        Past Surgical History:        Procedure Laterality Date    APPENDECTOMY      CATARACT REMOVAL       SECTION      x4    CORNEAL TRANSPLANT      bilat    FINGER SURGERY Left 2018    INDEX FINGER    HAMMER TOE SURGERY      right and left    JOINT REPLACEMENT Left 10/15/14    TKA    KNEE ARTHROSCOPY Left     LITHOTRIPSY      REPLACEMENT SHOULDER TOTAL Right 2019    SHOULDER TOTAL ARTHROPLASTY, OPEN PARTIAL CLAVICULECTOMY performed by Sulaiman Drake MD at [address] [place] [address] Right 2019    Dr. Peña Hy TONSILLECTOMY         Social History:   Social History     Socioeconomic History    Marital status:      Spouse name: Not on file    Number of children: Not on file    Years of education: Not on file    Highest education level: Not on file   Occupational History    Not on file   Tobacco Use    Smoking status: Never Smoker    Smokeless tobacco: Never Used   Substance and Sexual Activity    Alcohol use: No    Drug use: Never    Sexual activity: Not on file   Other Topics Concern    Not on file   Social History Narrative    Not on file     Social Determinants of Health     Financial Resource Strain: Low Risk     Difficulty of Paying Living Expenses: Not hard at all   Food Insecurity: No Food Insecurity    Worried About Running Out of Food in the Last Year: Never true    Kevin of Food in the Last Year: Never true   Transportation Needs: No Transportation Needs    Lack of Transportation (Medical): No    Lack of Transportation (Non-Medical):  No   Physical Activity:     Days of Exercise per Week: Not on file    Minutes of Exercise per Session: Not on file   Stress:     Feeling of Stress : Not on file   Social Connections:     Frequency of Communication with Friends and Family: Not on file    Frequency of Social Gatherings with Friends and Family: Not on file    Attends Protestant Services: Not on file    Active Member of [address] [place] [address] [employer] or Organizations: Not on file    Attends Club or Organization Meetings: Not on file    Marital Status: Not on file   Intimate Partner Violence:     Fear of Current or Ex-Partner: Not on file    Emotionally Abused: Not on file    Physically Abused: Not on file    Sexually Abused: Not on file   Housing Stability:     Unable to Pay for Housing in the Last Year: Not on file    Number of [place] in the Last Year: Not on file    Unstable Housing in the Last Year: Not on file       Family History:       Problem Relation Age of Onset    Diabetes Mother    Carlos A Frederick Blood Pressure Mother     Diabetes Father     Cancer Father         lung    High Blood Pressure Father     Diabetes Sister     Heart Disease Sister        Allergies:  Patient has no known allergies. Home Medications:  Prior to Admission medications    Medication Sig Start Date End Date Taking? Authorizing Provider   lisinopril (PRINIVIL;ZESTRIL) 10 MG tablet Take 1 tablet by mouth daily 3/21/22  Yes Bill Mcdonald MD   hydroCHLOROthiazide (HYDRODIURIL) 25 MG tablet Take 1 tablet by mouth every other day 3/22/22   Bill Mcdonald MD   levothyroxine (SYNTHROID) 88 MCG tablet Take 1 tablet by mouth daily 3/22/22   Bill Mcdonald MD   aspirin 81 MG chewable tablet Take 81 mg by mouth daily    Historical Provider, MD   Calcium Carbonate (CALCIUM 600 PO) Take by mouth daily    Historical Provider, MD   Multiple Vitamins-Minerals (PRESERVISION AREDS 2) CAPS Take by mouth daily    Historical Provider, MD   acetaminophen (TYLENOL) 500 MG tablet Take 500 mg by mouth every 6 hours as needed for Pain    Historical Provider, MD   Cholecalciferol (VITAMIN D3) 2000 UNITS CAPS Take 1,000 Int'l Units/day by mouth.     Historical Provider, MD       Current Medications:   Current Facility-Administered Medications: sodium chloride flush 0.9 % injection 5-40 mL, 5-40 mL, IntraVENous, 2 times per day  sodium chloride flush 0.9 % injection 5-40 mL, 5-40 mL, IntraVENous, PRN  0.9 % sodium chloride infusion, 25 mL, IntraVENous, PRN  ondansetron (ZOFRAN-ODT) disintegrating tablet 4 mg, 4 mg, Oral, Q8H PRN **OR** ondansetron (ZOFRAN) injection 4 mg, 4 mg, IntraVENous, Q6H PRN  polyethylene glycol (GLYCOLAX) packet 17 g, 17 g, Oral, Daily  acetaminophen (TYLENOL) tablet 1,000 mg, 1,000 mg, Oral, 3 times per day  gabapentin (NEURONTIN) capsule 100 mg, 100 mg, Oral, Q8H  ondansetron (ZOFRAN-ODT) disintegrating tablet 4 mg, 4 mg, Oral, Q8H PRN **OR** ondansetron (ZOFRAN) injection 4 mg, 4 mg, IntraVENous, Q6H PRN  calcium carbonate tablet 600 mg, 600 mg, Oral, Daily  hydroCHLOROthiazide (HYDRODIURIL) tablet 25 mg, 25 mg, Oral, Every Other Day  levothyroxine (SYNTHROID) tablet 88 mcg, 88 mcg, Oral, Daily  lisinopril (PRINIVIL;ZESTRIL) tablet 10 mg, 10 mg, Oral, Daily  [START ON 3/23/2022] dextrose 5 % and 0.45 % sodium chloride infusion, , IntraVENous, Continuous    REVIEW OF SYSTEMS:       CONSTITUTIONAL: negative for fatigue, malaise, wt loss or gain   EYES: negative for double vision, photophobia, tunnel vision   HEENT: negative for tinnitus, hearing loss, sore throat, otorrhea, rhinorrhea   RESPIRATORY: negative for cough, shortness of breath, hemptysis   CARDIOVASCULAR: negative for chest pain, palpitations   GASTROINTESTINAL: negative for nausea, vomiting, diarrhea, hematamesis, melena   GENITOURINARY: negative for incontinence, urinary retention   MUSCULOSKELETAL: negative for new or worsened neck or back pain   NEUROLOGICAL: negative for seizures, new numbness, tingling, weakness, paresthesias   PSYCHIATRIC: negative for new or worsened anxiety or depression     Review of systems otherwise negative.     PHYSICAL EXAM:       BP (!) 130/90   Pulse 78   Temp 98.7 °F (37.1 °C) (Oral)   Resp 16   Ht 4' 7\" (1.397 m)   Wt 167 lb (75.8 kg)   SpO2 98%   BMI 38.81 kg/m²     CONSTITUTIONAL: no apparent distress, appears stated age   HEAD: normocephalic, atraumatic, no garvin sign or racoon eyes   ENT: moist mucous membranes, no rhinorrhea or otorrhea   NECK: supple, symmetric, no midline tenderness to palpation   BACK: without midline tenderness, step-offs or deformities   LUNGS: Normoxic, no accessorizing, equal chest rise and fall   CARDIOVASCULAR: regular rate and rhythm per telemetry   ABDOMEN: Soft, non-tender, non-distended    NEUROLOGIC:  EYE OPENING     Spontaneous - 4 []       To voice - 3 []       To pain - 2 []       None - 1 []    VERBAL RESPONSE     Appropriate, oriented - 5 []       Dazed or confused - 4 [] Syllables, expletives - 3 []       Grunts - 2 []       None - 1 []    MOTOR RESPONSE     Spontaneous, command - 6 []       Localizes pain - 5 []       Withdraws pain - 4 []       Abnormal flexion - 3 []       Abnormal extension - 2 []       None - 1 []            Total GCS: 15    Mental Status: Oriented x3               Cranial Nerves:    Pupils equal and reactive to light at 4  Extraocular motion intact  No nystagmus  Face symmetric  Shrug symmetric  Tongue and uvula midline   tone symmetric, V1-3 sensation intact/symmetric to light touch  Hearing symmetric    Motor Exam:    Drift:  absent  Tone:  normal    4-5  wrist extension and intrinsics with some contractures    Sensory:    Right Upper Extremity:  normal  Left Upper Extremity:  normal  Right Lower Extremity:  normal  Left Lower Extremity:  normal    Deep Tendon Reflexes:    Right Bicep:  1+  Left Bicep:  1+  Right Knee:  1+  Left Knee:  1+    Plantar Response:    Right: downgoing  Left:  downgoing    Clonus:  absent  Albarran's:  absent    Gait:  Not tested   SKIN: no rash       LABS AND IMAGING:     CBC with Differential:    Lab Results   Component Value Date    WBC 9.7 03/22/2022    RBC 3.93 03/22/2022    HGB 12.8 03/22/2022    HCT 40.3 03/22/2022     03/22/2022    .5 03/22/2022    MCH 32.6 03/22/2022    MCHC 31.8 03/22/2022    RDW 12.9 03/22/2022    LYMPHOPCT 7 03/22/2022    MONOPCT 10 03/22/2022    BASOPCT 0 03/22/2022    MONOSABS 0.93 03/22/2022    LYMPHSABS 0.65 03/22/2022    EOSABS 0.03 03/22/2022    BASOSABS <0.03 03/22/2022    DIFFTYPE NOT REPORTED 01/06/2022     BMP:    Lab Results   Component Value Date     03/22/2022    K 3.6 03/22/2022     03/22/2022    CO2 29 03/22/2022    BUN 25 03/22/2022    LABALBU 4.5 03/22/2022    CREATININE 0.57 03/22/2022    CALCIUM 9.6 03/22/2022    GFRAA >60 03/22/2022    LABGLOM >60 03/22/2022    GLUCOSE 120 03/22/2022       Radiology Review: CT shows nondisplaced anterior arch and posterior arch fracture along with type II dens fracture with posterior angulation but no retropulsion.         DO FARHAD Barr pager [phone]  3/22/2022  9:58 PM

## 2022-03-23 NOTE — PROGRESS NOTES
Occupational Therapy   Occupational Therapy Initial Assessment  Date: 3/23/2022   Patient Name: Naya James  MRN: [id number]     : 1945    Date of Service: 3/23/2022    Chief Complaint   Patient presents with    Neck Pain     Mechancial fall yesterday, has C1 C2 fracture. In C-Collar     Discharge Recommendations:  Patient would benefit from continued therapy after discharge     OT Equipment Recommendations  Equipment Needed: Yes  Mobility Devices: Burnetta Chris; ADL Assistive Devices  Walker: Rolling  ADL Assistive Devices: Grab Bars - toilet;Transfer Tub Bench    Assessment   Performance deficits / Impairments: Decreased functional mobility ; Decreased ADL status; Decreased safe awareness;Decreased balance;Decreased high-level IADLs;Decreased endurance  Assessment: Pt currently limited in performing ADL tasks and functional transfers/functional mobility due to above noted deficits. Pt currently requiring CGA with use of RW during functional transfers/functional mobility and to perform toileting tasks however is requiring higher levels of assistance during ADL performance- min A during UB ADL performance and mod A during LB ADL performance; pt stating family is able to provide this level of assistance. Pt educated in the recommendation of 24hr assistance at this time, pt verbalized understanding and reports will discuss with family. Pt would benefit from continued therapy services while hospitalized and at discharge to maximize pt's safety and independence in performing functional tasks. Prognosis: Good  Decision Making: Medium Complexity  OT Education: OT Role;Plan of Care;Precautions; ADL Adaptive Strategies;Transfer Training;Equipment (Activity Promotion, Safety Awareness/Fall Prevention, C-Spine Precautions/Use of C-Collar, Bed Mobility Techniques, Safety with Transfers/RW Mngt, ADL Techniques; good return)  REQUIRES OT FOLLOW UP: Yes  Activity Tolerance  Activity Tolerance: Patient limited by pain (headache)  Safety Devices  Safety Devices in place: Yes  Type of devices: Call light within reach;Nurse notified; Left in chair  Restraints  Initially in place: No           Patient Diagnosis(es): The primary encounter diagnosis was Closed unstable burst fracture of first cervical vertebra, initial encounter ([facility] [address] Diagnoses of Closed odontoid fracture, initial encounter ([facility] [address] and Closed nondisplaced fracture of first cervical vertebra, unspecified fracture morphology, initial encounter [facility]) were also pertinent to this visit. has a past medical history of Arthritis, Edema, Hyperlipidemia, Hypertension, Hypothyroidism, Kidney stones, Osteoarthritis, Thyroid disease, and Unspecified transient cerebral ischemia. has a past surgical history that includes Lithotripsy; Knee arthroscopy (Left);  section; Tonsillectomy; Appendectomy; Hammer toe surgery; Cataract removal; Corneal transplant; joint replacement (Left, 10/15/14); Finger surgery (Left, 2018); Shoulder Arthroplasty (Right, 2019); and REPLACEMENT SHOULDER TOTAL (Right, 2019). Restrictions  Restrictions/Precautions  Restrictions/Precautions: Fall Risk  Required Braces or Orthoses?: Yes  Required Braces or Orthoses  Cervical: c-collar (\"when HOB above 45* and when OOB\" per neurosx)  Position Activity Restriction  Other position/activity restrictions: Up with assistance    Subjective   General  Patient assessed for rehabilitation services?: Yes  Family / Caregiver Present: No  General Comment  Comments: RN ok'd for therapy this AM. Co-evaluation with PT. Pt agreeable to participate in session and pleasant/cooperative throughout.   Patient Currently in Pain: Yes  Pain Assessment  Pain Assessment: 0-10  Pain Level: 7  Pain Type: Acute pain  Pain Location: Head;Neck  Pain Descriptors: Aching;Headache  Functional Pain Assessment: Activities are not prevented  Non-Pharmaceutical Pain Intervention(s): Ambulation/Increased Activity; Distraction;Repositioned  Response to Pain Intervention: Patient Satisfied      Social/Functional History  Social/Functional History  Lives With: Son  Type of Home: House  Home Layout: One level  Home Access: Stairs to enter without rails  Entrance Stairs - Number of Steps: one step  Bathroom Shower/Tub: Tub/Shower unit  Bathroom Toilet: Handicap height  Bathroom Equipment:  (pt reports none)  Bathroom Accessibility: Accessible  Home Equipment: 4 wheeled walker  Receives Help From: Family (Pt reports supportive son and dtr, pt states both son/dtr work full time and pt would need to discuss 24hr assist with them)  ADL Assistance: Independent  Homemaking Assistance: Independent  Homemaking Responsibilities: Yes  Meal Prep Responsibility: Primary  Laundry Responsibility: No (son performs)  Cleaning Responsibility: Primary  Shopping Responsibility: Primary  Health Care Management: Primary  Ambulation Assistance: Independent  Transfer Assistance: Independent  Active : Yes  Mode of Transportation: Car  Occupation: Full time employment  Type of occupation:   Leisure & Hobbies: Enjoys going to the [employer] and playing [employer] games on her tablet       Objective   Vision: Impaired  Vision Exceptions: Wears glasses at all times  Hearing: Within functional limits    Orientation  Overall Orientation Status: Within Functional Limits        Balance  Sitting Balance: Stand by assistance (seated EOB ~10 minutes total, seated on toilet ~4 minutes, seated at edge of recliner chair ~8 minutes)  Standing Balance: Contact guard assistance  Standing Balance  Time: ~4 minutes, ~6 minutes, ~1 minute  Activity: static standing at EOB, functional mobility to/from bathroom, toileting tasks, sink side grooming tasks, functional mobility from bed > chair  Comment: CGA with use of RW; mildly unsteady however no true LOB; increased time/effort to perform as pt with slow pace and guarded movements, 2x seated rest breaks required due to fatigue; pt educated in RW mngt/navigation and obstacle negotiation due to visual restrictions with c-collar    Functional Mobility  Functional - Mobility Device: Rolling Walker  Activity: To/from bathroom  Assist Level: Contact guard assistance    Toilet Transfers  Toilet - Technique: Ambulating (with use of RW)  Equipment Used: Standard toilet (with use of grab bars)  Toilet Transfer: Contact guard assistance     ADL  Feeding: Setup; Increased time to complete  Grooming: Increased time to complete;Contact guard assistance (standing sink side to perform hand hygiene)  UE Bathing: Increased time to complete;Minimal assistance  LE Bathing: Increased time to complete; Moderate assistance  UE Dressing: Increased time to complete;Minimal assistance  LE Dressing: Increased time to complete; Moderate assistance (seated EOB to doff/don socks)  Toileting: Contact guard assistance; Increased time to complete (for LB clothing mngt, pericare/bottom hygiene, and toilet transfer; use of grab bars)  Comments: Pt limited in ADL performance due to c-spine restrictions and limitations with visual awareness due to c-collar. Tone RUE  RUE Tone: Normotonic  Tone LUE  LUE Tone: Normotonic  Coordination  Movements Are Fluid And Coordinated: Yes        Bed mobility  Supine to Sit: Minimal assistance (HOB raised ~15* and use of bed rail)  Sit to Supine:  (pt retired up to chair at end of session)  Scooting: Contact guard assistance     Transfers  Sit to stand: Contact guard assistance  Stand to sit: Contact guard assistance  Transfer Comments: Min VCs for proper hand placement with use of RW during functional transfer; increased time/effort to perform        Cognition  Overall Cognitive Status: Exceptions  Arousal/Alertness: Appropriate responses to stimuli  Following Commands:  Follows all commands without difficulty  Attention Span: Appears intact  Memory: Appears intact  Safety Judgement: Decreased awareness of need for assistance  Problem Solving: Able to problem solve independently  Insights: Decreased awareness of deficits        Sensation  Overall Sensation Status: WFL (Pt denies any numbness/tingling)        LUE AROM (degrees)  LUE AROM : WFL (within c-spine precautions)  Left Hand AROM (degrees)  Left Hand AROM: WFL  RUE AROM (degrees)  RUE AROM : WFL (within c-spine precautions)  Right Hand AROM (degrees)  Right Hand AROM: WFL  LUE Strength  Gross LUE Strength: WFL  L Hand General: 4/5  RUE Strength  Gross RUE Strength: WFL  R Hand General: 4/5        Plan   Plan  Times per week: 4-5x/wk  Current Treatment Recommendations: Balance Training,Functional Mobility Training,Endurance Training,Safety Education & Training,Patient/Caregiver Education & Training,Equipment Evaluation, Education, & procurement,Home Management Training,Self-Care / ADL      AM-PAC Score   AM-[facility] Inpatient Daily Activity Raw Score: 17 (03/23/22 [facility])  AM-PAC Inpatient ADL T-Scale Score : 37.26 (03/23/22 [facility])  ADL Inpatient CMS 0-100% Score: 50.11 (03/23/22 [facility])  ADL Inpatient CMS G-Code Modifier : CK (03/23/22 1054)    Goals  Short term goals  Time Frame for Short term goals: Pt will, by discharge:  Short term goal 1: Perform bed mobility tasks independently  Short term goal 2: Perform functional transfers/functional mobility with mod IND using least restrictive AD  Short term goal 3: Perform ADL tasks with mod IND using AE/DME PRN  Short term goal 4:  Independently demo good safety awareness, including maintaining C-spine precautions, during engagement in all ADLs and functional transfers/functional mobility  Short term goal 5: Demo 10+ minutes standing tolerance with use of least restrictive AD for increased participation in ADL/IADL tasks       Therapy Time   Individual Concurrent Group Co-treatment   Time In 0925         Time Out 1003         Minutes 38         Timed Code Treatment Minutes: 21 Minutes       ARUN Parra/LAWRENCE

## 2022-03-23 NOTE — CARE COORDINATION
SBIRT- completed see below  Met with pt this a.m. was awake and answered all questions appropriately. Pt denies any drug or alcohol use  Pt also denies having any feelings of depression or suicidal ideations. Screenings were negative. Alcohol Screening and Brief Intervention        No results for input(s): ALC in the last 72 hours. Alcohol Pre-screening     (WOMEN ONLY) How many times in the past year have you had 4 or more drinks in a day?: None    Alcohol Screening Audit       Drug Pre-Screening   How many times in the past year have you used a recreational drug or used a prescription medication for nonmedical reasons?: None    Drug Screening DAST       Mood Pre-Screening (PHQ-2)  During the past two weeks, have you been bothered by little interest or pleasure in doing things?: No  During the past two weeks, have you been bothered by feeling down, depressed, or hopeless?: No    Mood Pre-Screening (PHQ-9)         I have interviewed Kiersten Calixto, [id number] regarding  Her alcohol consumption/drug use and risk for excessive use. Screenings were negative. Patient  N/A intervention at this time.      Deferred []    Completed on: 3/23/2022   [facility]

## 2022-03-23 NOTE — ED NOTES
Pt to x ray department        Dasha Lan, [facility] [place] [place]  03/22/22 1807

## 2022-03-24 VITALS
HEART RATE: 80 BPM | WEIGHT: 167 LBS | DIASTOLIC BLOOD PRESSURE: 86 MMHG | RESPIRATION RATE: 19 BRPM | HEIGHT: 55 IN | BODY MASS INDEX: 38.65 KG/M2 | TEMPERATURE: 98.3 F | OXYGEN SATURATION: 92 % | SYSTOLIC BLOOD PRESSURE: 132 MMHG

## 2022-03-24 PROBLEM — R21 SKIN RASH: Status: RESOLVED | Noted: 2021-12-21 | Resolved: 2022-03-24

## 2022-03-24 PROBLEM — L03.317 CELLULITIS OF BUTTOCK: Status: RESOLVED | Noted: 2021-12-21 | Resolved: 2022-03-24

## 2022-03-24 PROBLEM — S12.110A ODONTOID FRACTURE (HCC): Status: ACTIVE | Noted: 2022-03-24

## 2022-03-24 PROBLEM — M43.12 ANTEROLISTHESIS OF CERVICAL SPINE: Status: ACTIVE | Noted: 2022-03-24

## 2022-03-24 PROCEDURE — 6370000000 HC RX 637 (ALT 250 FOR IP): Performed by: STUDENT IN AN ORGANIZED HEALTH CARE EDUCATION/TRAINING PROGRAM

## 2022-03-24 PROCEDURE — 99222 1ST HOSP IP/OBS MODERATE 55: CPT | Performed by: FAMILY MEDICINE

## 2022-03-24 PROCEDURE — 2580000003 HC RX 258: Performed by: STUDENT IN AN ORGANIZED HEALTH CARE EDUCATION/TRAINING PROGRAM

## 2022-03-24 PROCEDURE — 6360000002 HC RX W HCPCS: Performed by: STUDENT IN AN ORGANIZED HEALTH CARE EDUCATION/TRAINING PROGRAM

## 2022-03-24 PROCEDURE — 92526 ORAL FUNCTION THERAPY: CPT

## 2022-03-24 PROCEDURE — 97535 SELF CARE MNGMENT TRAINING: CPT

## 2022-03-24 RX ORDER — OXYCODONE HYDROCHLORIDE 5 MG/1
2.5 TABLET ORAL EVERY 6 HOURS PRN
Qty: 10 TABLET | Refills: 0 | Status: SHIPPED | OUTPATIENT
Start: 2022-03-24 | End: 2022-03-31

## 2022-03-24 RX ADMIN — SODIUM CHLORIDE, PRESERVATIVE FREE 10 ML: 5 INJECTION INTRAVENOUS at 08:37

## 2022-03-24 RX ADMIN — OXYCODONE 2.5 MG: 5 TABLET ORAL at 08:38

## 2022-03-24 RX ADMIN — GABAPENTIN 100 MG: 100 CAPSULE ORAL at 11:50

## 2022-03-24 RX ADMIN — ACETAMINOPHEN 1000 MG: 500 TABLET ORAL at 13:35

## 2022-03-24 RX ADMIN — LISINOPRIL 10 MG: 10 TABLET ORAL at 08:37

## 2022-03-24 RX ADMIN — LEVOTHYROXINE SODIUM 88 MCG: 88 TABLET ORAL at 08:37

## 2022-03-24 RX ADMIN — GABAPENTIN 100 MG: 100 CAPSULE ORAL at 04:58

## 2022-03-24 RX ADMIN — ASPIRIN 81 MG: 81 TABLET, CHEWABLE ORAL at 08:37

## 2022-03-24 RX ADMIN — Medication 600 MG: at 08:37

## 2022-03-24 RX ADMIN — ENOXAPARIN SODIUM 30 MG: 100 INJECTION SUBCUTANEOUS at 08:37

## 2022-03-24 RX ADMIN — ACETAMINOPHEN 1000 MG: 500 TABLET ORAL at 06:31

## 2022-03-24 RX ADMIN — OXYCODONE 2.5 MG: 5 TABLET ORAL at 04:18

## 2022-03-24 RX ADMIN — POLYETHYLENE GLYCOL 3350 17 G: 17 POWDER, FOR SOLUTION ORAL at 08:37

## 2022-03-24 RX ADMIN — HYDROCHLOROTHIAZIDE 25 MG: 25 TABLET ORAL at 08:37

## 2022-03-24 ASSESSMENT — PAIN SCALES - GENERAL
PAINLEVEL_OUTOF10: 8
PAINLEVEL_OUTOF10: 6
PAINLEVEL_OUTOF10: 8
PAINLEVEL_OUTOF10: 6

## 2022-03-24 ASSESSMENT — PAIN - FUNCTIONAL ASSESSMENT
PAIN_FUNCTIONAL_ASSESSMENT: PREVENTS OR INTERFERES SOME ACTIVE ACTIVITIES AND ADLS
PAIN_FUNCTIONAL_ASSESSMENT: ACTIVITIES ARE NOT PREVENTED

## 2022-03-24 ASSESSMENT — PAIN DESCRIPTION - DESCRIPTORS
DESCRIPTORS: ACHING

## 2022-03-24 ASSESSMENT — PAIN DESCRIPTION - PROGRESSION
CLINICAL_PROGRESSION: GRADUALLY IMPROVING
CLINICAL_PROGRESSION: GRADUALLY IMPROVING

## 2022-03-24 ASSESSMENT — PAIN DESCRIPTION - FREQUENCY
FREQUENCY: CONTINUOUS
FREQUENCY: INTERMITTENT
FREQUENCY: INTERMITTENT

## 2022-03-24 ASSESSMENT — PAIN DESCRIPTION - LOCATION
LOCATION: HEAD

## 2022-03-24 ASSESSMENT — PAIN DESCRIPTION - DIRECTION: RADIATING_TOWARDS: NECK

## 2022-03-24 ASSESSMENT — PAIN DESCRIPTION - PAIN TYPE
TYPE: ACUTE PAIN

## 2022-03-24 ASSESSMENT — PAIN DESCRIPTION - ORIENTATION
ORIENTATION: RIGHT
ORIENTATION: RIGHT

## 2022-03-24 ASSESSMENT — PAIN DESCRIPTION - ONSET
ONSET: PROGRESSIVE
ONSET: GRADUAL

## 2022-03-24 NOTE — CONSULTS
Palliative Care Inpatient Consult    NAME:  Tobias Krueger Dr RECORD NUMBER:  [id number]  AGE: 64 y.o.    GENDER: female  : 1945  TODAY'S DATE:  3/24/2022    Reasons for Consultation:    Symptom and/or pain management  Provision of information regarding PC and/or hospice philosophies  Complex, time-intensive communication and interdisciplinary psychosocial support  Clarification of goals of care and/or assistance with difficult decision-making  Guidance in regards to resources and transition(s)    Members of PC team contributing to this consultation are :  Dr. Quang Ramirez palliative care attending  Plan      Palliative Interaction:  The patient was seen today along with medical student on palliative care rotation, was sitting comfortably on the recliner alert, awake, oriented, following commands  No family member was present in patient's room  I introduced myself to the patient and explained her the role of palliative care and told her that palliative care is an extra layer of support and strength for the patient and family  Patient told that she is , lives at home with her son, has 3 living children  I explained the significance of POA paperwork for health to the patient and patient stated that she will her son Slava Tadeo and daughter Juanita Corcoran to make decisions for her if anytime she is not able to do so  Patient further said that her daughter Juanita Corcoran is looking into making POA paperwork for health for her  I discussed patient's current medical conditions with her and patient stated that she had fracture of her neck bone due to falling while she was bending forward  I explained the different types of codes to the patient  Patient told that she would want to go home with home health care  I explained to the patient that the  will be helping her with the discharge planning  I offered comfort and emotional support to the patient      Education/support to staff  Education/support to family  Education/support to patient  Discharge planning/helping to coordinate care  Communications with primary service  Caregiver support/education  Code status clarified: Full Code  Code status clarified: [facility]  Code status clarified: [address] [facility]  Other major issues      History of Present Illness     The patient is a 64 y.o. Non- / non  female who presents with Neck Pain (Mechancial fall yesterday, has C1 C2 fracture. In C-Collar)      Referred to Palliative Care by   [x] Physician   [] Nursing  [] Family Request   [] Other:       She was admitted to the trauma service for Closed unstable burst [facility] first [facility] vertebra[facility] [S12. 02XA]  Closed nondisplaced fracture of first cervical vertebra, unspecified fracture morphology, initial encounter ([facility] Kristi Volodymyr fracture with nonunion, subsequent encounter [S12.01XK]  Closed odontoid fracture, initial encounter ([facility] [S12.100A]. Her hospital course has been associated with [facility] fracture with nonunion, subsequent encounter. The patient has a complicated medical history and has been hospitalized since 3/22/2022  3:55 PM.  The patient was transferred from [facility] where she was brought in after a fall while bending over. Patient had complained of having some axial neck pain, nonradiating, with no new numbness, tingling or weakness of upper or lower extremities, with no loss of consciousness. Patient does have history of TIA as well as joint deformities with contractures of the hands partially and is on aspirin at baseline. Patient was transferred here for further management. CT showed nondisplaced anterior arch and posterior arch fracture along with type II dens fracture with posterior angulation but no retropulsion. CTA head and neck showed concern for right vertebral artery initial irregularity.   Endovascular surgery was consulted and currently do not recommend any acute intervention neurosurgery was consulted and did not recommend any surgery. Palliative care consulted for family support.     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] 2022       PAST MEDICAL HISTORY      Diagnosis Date    Arthritis     osteo    Edema     Hyperlipidemia     Hypertension     Hypothyroidism     Kidney stones     Osteoarthritis     Thyroid disease     Unspecified transient cerebral ischemia        PAST SURGICAL HISTORY  Past Surgical History:   Procedure Laterality Date    APPENDECTOMY      CATARACT REMOVAL       SECTION      x4    CORNEAL TRANSPLANT      bilat    FINGER SURGERY Left 2018    INDEX FINGER    HAMMER TOE SURGERY      right and left    JOINT REPLACEMENT Left 10/15/14    TKA    KNEE ARTHROSCOPY Left     LITHOTRIPSY      REPLACEMENT SHOULDER TOTAL Right 2019    SHOULDER TOTAL ARTHROPLASTY, OPEN PARTIAL CLAVICULECTOMY performed by Adrien Crane MD at [address] [place] [address] Right 2019    Dr. Osvaldo Khanna  Social History     Tobacco Use    Smoking status: Never Smoker    Smokeless tobacco: Never Used   Substance Use Topics    Alcohol use: No    Drug use: Never       ALLERGIES  No Known Allergies      MEDICATIONS  Current Medications    aspirin  81 mg Oral Daily    enoxaparin  30 mg SubCUTAneous BID    sodium chloride flush  5-40 mL IntraVENous 2 times per day    polyethylene glycol  17 g Oral Daily    acetaminophen  1,000 mg Oral 3 times per day    gabapentin  100 mg Oral Q8H    calcium carbonate  600 mg Oral Daily    hydroCHLOROthiazide  25 mg Oral Every Other Day    levothyroxine  88 mcg Oral Daily    lisinopril  10 mg Oral Daily     oxyCODONE, sodium chloride flush, sodium chloride, ondansetron **OR** ondansetron, ondansetron **OR** Relation Age of Onset    Diabetes Mother     High Blood Pressure Mother     Diabetes Father     Cancer Father         lung    High Blood Pressure Father     Diabetes Sister     Heart Disease Sister        Social History     Tobacco Use    Smoking status: Never Smoker    Smokeless tobacco: Never Used   Substance Use Topics    Alcohol use: No    Drug use: Never           Assessment        REVIEW OF SYSTEMS  Constitutional: no fever, no chills or weight loss, fatigue + , tiredness +  Eyes: no eye pain or blurred vision  ENT: no hearing loss, congestion, or difficulty swallowing   Respiratory: no wheezing, chest tightness, or shortness of breath   Cardiovascular: no chest pain or pressure, no palpitations, no diaphoresis   Gastrointestinal: no nausea, vomiting,abdominal pain, diarrhea or constipation, no melena   Genitourinary: no dysuria, frequency, hematuria, or nocturia   Musculoskeletal: no myalgias or arthralgias, no back pain   Skin: no rashes or sores   Neurological: no focal weakness, numbness, tingling or headache, no seizures    PHYSICAL ASSESSMENT:  Constitutional: Alert and oriented to person, place, and time. Head: Normocephalic and atraumatic. Eyes: EOM are normal  Neck: C-collar present  Cardiovascular: Normal rate and regular rhythm  Pulmonary/Chest: Effort normal and breath sounds normal. No rales or wheezes. Abdomen: not distended, no ascites  Musculoskeletal: Normal range of motion. No edema lower ext. Neurological: Alert, awake, oriented, following commands  Skin: Normal turgor, no bleeding, no bruising. Palliative Performance Scale:  ___60%  Ambulation reduced; Significant disease; Can't do hobbies/housework; intake normal or reduced; occasional assist; LOC full/confusion  __x_50%  Mainly sit/lie;  Extensive disease; Can't do any work; Considerable assist; intake normal or reduced; LOC full/confusion  ___40%  Mainly in bed; Extensive disease; Mainly assist; intake normal or reduced; LOC full/confusion   ___30%  Bed Bound; Extensive disease; Total care; intake reduced; LOC full/confusion  ___20%  Bed Bound; Extensive disease; Total care; intake minimal; Drowsy/coma  ___10%  Bed Bound; Extensive disease; Total care; Mouth care only; Drowsy/coma  ___0       Death      Principle Problem/Diagnosis:  Ric fracture with nonunion, subsequent encounter    Additional Assessments:   Principal Problem:    Ric fracture with nonunion, subsequent encounter  Active Problems:    Odontoid fracture (HCC)    Anterolisthesis of cervical spine    1- Symptom management/ pain control     Pain Assessment:  Pain is controlled with current analgesics. Medication(s) being used: acetaminophen, narcotic analgesics including oxycodone. Anxiety:  fatigue                          Dyspnea:  none                          Fatigue:  Tiredness and weakness    We feel the patient symptoms are being controlled. her current regimen is reviewed by myself and discussed with the staff. We will continue to follow-up with the patient for further goals of care  We will continue to provide comfort and support to the patient and family    2- Goals of care evaluation   The patient goals of care are improve or maintain function/quality of life, accomplish a particular personal goal, spiritual needs, strengthening relationships, preserve independence/autonomy/control and support for family/caregiver   Goals of care discussed with:    [x] Patient independently    [] Patient and Family    [] Family or Healthcare DPOA independently    [] Unable to discuss with patient, family/DPOA not present    3- Code Status  Full Code    4- Other recommendations   - We will continue to provide comfort and support to the patient and the family  Please call with any palliative questions or concerns. Palliative Care Team is available via perfect serve or via phone. Palliative Care will continue to follow Ms. Tay's care as needed. Thank you for allowing Palliative Care to participate in the care of Ms. Tay . This note has been dictated by dragon, typing errors may be a possibility.     The total time I spent in seeing the patient, discussing goals of care, advanced directives, code status, greater than 50% time in counseling and other major issues was more than 60 minutes      Electronically signed by   Coco Veronica MD  Palliative Care Team  on 3/24/2022 at 10:43 AM    Palliative care office: [phone]

## 2022-03-24 NOTE — PROGRESS NOTES
PROGRESS NOTE    PATIENT NAME: Tobias Krueger Dr RECORD NO. [id number]  DATE: 3/24/2022  SURGEON: Dr. Carla Freedman: Bill Mcdonald MD    HD: # 2    ASSESSMENT    Patient Active Problem List   Diagnosis    Transient cerebral ischemia    Essential hypertension    Hyperlipidemia    Primary osteoarthritis involving multiple joints    Hypothyroidism    Primary osteoarthritis of right shoulder    Morbidly obese (HCC)    Skin rash    Cellulitis of buttock    Hair loss    Primary hypertension    Ric fracture with nonunion, subsequent encounter       MEDICAL DECISION MAKING AND PLAN    N: Pain and nausea control as needed. MMPT: tylenol, roxicodone, gabapentin   Neurosurgery recommendations: non-operative management and 6 week repeat XR. Endovascular: follow up OP 4-6 weeks. Continue aspirin 81 mg  C: HDS. Home meds: hctz, lisinopril  P: Encourage IS, deep breathing, and cough. FEN: No additional IV fluids indicated  GI: Diet: Patient tolerating diet  R: Voiding. 2 unmeasured occurrences. H: VTE ppx: Lovenox twice daily  I: Afebrile. No leukocytosis  E: Home dose levothyroxine  M: PT/OT. Cervical collar to be worn when head of bead above 45 degrees or out of bed, can be off to eat. LDA: PIV    Dispo: Medically stable for discharge, plan for home with home care. Chief Complaint: I have neck pain this morning    SUBJECTIVE    Marnee Knock was seen and examined at bedside. No acute events overnight. Patient states that the collar feels like it is too tight. Adjusted slightly on examination. Denies fevers chills nausea vomiting shortness of breath. Passing flatus, tolerating diet.     OBJECTIVE  VITALS: Temp: Temp: 97.7 °F (36.5 °C)Temp  Av °F (36.7 °C)  Min: 97.5 °F (36.4 °C)  Max: 98.4 °F (68.8 °C) BP Systolic (70YTQ), GDY:199 , Min:113 , OTB:201   Diastolic (72IHD), KKX:60, Min:63, Max:77   Pulse Pulse  Av.7  Min: 63  Max: 84 Resp Resp  Av  Min: 12  Max: 27 Pulse ox SpO2  Av.7 %  Min: 92 %  Max: 97 %  GENERAL: No acute distress, alert and oriented  HEENT: normocephalic, atraumatic. EMOI  NECK: Aspen collar in place  CV: Regular rate and rhythm  Pulm: Normal respiratory effort  ABD: Soft, nontender, nondistended, no rebound or guarding  Neuro: No focal deficits  MSK:  no deformities or injuries, moves all extremities equally  Skin: Warm, dry, cap refill <2  Psych: Normal mood      I/O last 3 completed shifts: In: 160.9 [I.V.:160.9]  Out: -     Drain/tube output:  No intake/output data recorded. LAB:  CBC:   Recent Labs     22  1050 22  0557   WBC 9.7 8.7   HGB 12.8 12.6   HCT 40.3 37.3   .5 98.2    168     BMP:   Recent Labs     22  1050 22  0557    138   K 3.6* 3.4*    99   CO2 29 29   BUN 25* 18   CREATININE 0.57 0.57   GLUCOSE 120* 156*     COAGS:   Recent Labs     22  1050   APTT 28.1   PROT 7.1   INR 1.1       RADIOLOGY:  No additional imaging to review. Kenneth Sears MD  3/24/2022  7:53 AM      Attending Note      I have reviewed the above [facility] note(s) and I either performed the key elements of the medical history and physical exam or was present with the trauma resident when the key elements of the medical history and physical exam were performed. I have discussed the findings, established the care plan and recommendations with the trauma team.  Non op rx, DC planning.     Michael Tristan MD  3/24/2022  10:17 AM

## 2022-03-24 NOTE — PROGRESS NOTES
CLINICAL PHARMACY NOTE: MEDS TO BEDS    Total # of Prescriptions Filled: 1   The following medications were delivered to the patient:  · Oxycodone 5mg tablet    Additional Documentation: medications delivered to the pt in room 416 on 03.24.22 at 14:07 by da [place] pay $1.02 paid cash.

## 2022-03-24 NOTE — PROGRESS NOTES
Speech Language Pathology  Speech Language Pathology  [facility]    Dysphagia Treatment Note    Date: 3/24/2022  Patients Name: Haydee Velasco  MRN: [id number]  Diagnosis:   Patient Active Problem List   Diagnosis Code    Transient cerebral ischemia G45.9    Essential hypertension I10    Hyperlipidemia E78.5    Primary osteoarthritis involving multiple joints M89.49    Hypothyroidism E03.9    Primary osteoarthritis of right shoulder M19.011    Morbidly obese (HCC) E66.01    Hair loss L65.9    Primary hypertension I10    Ric fracture with nonunion, subsequent encounter S12. 01XK    Odontoid fracture ([address] S12.110A    Anterolisthesis of cervical spine M43.12     Pain: 0    Dysphagia Treatment  Treatment time: [id number]    Subjective: [x] Alert [x] Cooperative     [] Confused     [] Agitated    [] Lethargic    Objective/Assessment:    Pt. Seen for diet tolerance monitoring. Pt was seen for BSSE on 3/23 d/t pt report of occasional coughing w/ thin liquids. ST recommended Regular diet w/ thin liquids. This date, pt observed w/ multiple trials of regular solids and thin liquids via straw w/ no overt s/s of aspiration. Pt independently implementing safe swallowing strategies. Pt reports she typically drinks from cup, however has been using a straw, as a cup is more difficult to drink from w/ C-collar. ST recommends pt continue w/ current diet of Regular diet with thin liquids at this time. Pt and ST reviewed swallowing strategies w/ good return. ST to d/c pt from services at this time; available for re-consult as needed. Plan:  [] Continue ST services    [x] Discharge from ST:      Discharge recommendations: []  Further therapy recommended at discharge. The patient should be able to tolerate at least 3 hours of therapy per day over 5 days or 15 hours over 7 days. [] Further therapy recommended at discharge.    [x] No therapy recommended at discharge    Treatment completed by: Tray Lot, SLP

## 2022-03-24 NOTE — PROGRESS NOTES
Occupational Therapy  Facility/Department: [facility] 4B STEPDOWN  Daily Treatment Note  NAME: Jesus Kinsey  : 1945  MRN: [id number]    Date of Service: 3/24/2022    Discharge Recommendations:  Patient would benefit from continued therapy after discharge  OT Equipment Recommendations  Equipment Needed: Yes  Mobility Devices: ADL Assistive Devices    Assessment   Performance deficits / Impairments: Decreased functional mobility ; Decreased ADL status; Decreased safe awareness;Decreased balance;Decreased high-level IADLs;Decreased endurance  Assessment: Pt continues to require some assist for ADL tasks and will continue to benefit from OT services following discharge to safely return to prior living situation  Prognosis: Good  OT Education: OT Role;Plan of Care;Precautions; ADL Adaptive Strategies;Transfer Training;Equipment  Patient Education: Adaptive equipment for [employer] and dress, body mechanics and safety with fair+ return demonstration  Barriers to Learning: c-collar limits visual field  REQUIRES OT FOLLOW UP: Yes  Activity Tolerance  Activity Tolerance: Patient Tolerated treatment well  Activity Tolerance: Pt tolerated full ADL session of 45 mintues without c/o increased pain or fatigue  Safety Devices  Safety Devices in place: Yes  Type of devices: Call light within reach;Nurse notified; Left in chair  Restraints  Initially in place: No         Patient Diagnosis(es): The primary encounter diagnosis was Closed unstable burst fracture of first cervical vertebra, initial encounter ([facility] [address] Diagnoses of Closed odontoid fracture, initial encounter ([facility] [address] and Closed nondisplaced fracture of first cervical vertebra, unspecified fracture morphology, initial encounter [facility]) were also pertinent to this visit. has a past medical history of Arthritis, Edema, Hyperlipidemia, Hypertension, Hypothyroidism, Kidney stones, Osteoarthritis, Thyroid disease, and Unspecified transient cerebral ischemia.    has a past surgical history that includes Lithotripsy; Knee arthroscopy (Left);  section; Tonsillectomy; Appendectomy; Hammer toe surgery; Cataract removal; Corneal transplant; joint replacement (Left, 10/15/14); Finger surgery (Left, 2018); Shoulder Arthroplasty (Right, 2019); and REPLACEMENT SHOULDER TOTAL (Right, 2019). Restrictions  Restrictions/Precautions  Restrictions/Precautions: Fall Risk,General Precautions  Required Braces or Orthoses?: No  Required Braces or Orthoses  Cervical: c-collar  Position Activity Restriction  Other position/activity restrictions: Up with assistance  Subjective   General  Patient assessed for rehabilitation services?: Yes  Response to previous treatment: Patient with no complaints from previous session  Family / Caregiver Present: No  Subjective  Subjective: RN ok'd pt for OT session this morning. Pt agreeable, pleasant, cooperative and motivated to learn for return to home  General Comment  Pain Assessment  Pain Assessment: 0-10  Pain Level: 6  Pain Type: Acute pain  Pain Location: Head  Pain Descriptors: Aching  Pain Frequency: Intermittent  Non-Pharmaceutical Pain Intervention(s): Ambulation/Increased Activity; Distraction  Response to Pain Intervention: Patient Satisfied  Vital Signs  Patient Currently in Pain: Yes   Orientation  Orientation  Overall Orientation Status: Within Functional Limits  Objective    ADL  Grooming: Stand by assistance;Contact guard assistance;Setup (standing at sinkside with no LOB)  UE Bathing: Setup;Stand by assistance  LE Bathing: Minimal assistance;Verbal cueing;Setup; Increased time to complete  UE Dressing: Setup;Contact guard assistance ([facility] hospital gown only.)  LE Dressing: Setup;Verbal cueing; Moderate assistance (slipper socks)  Toileting: Setup;Stand by assistance;Contact guard assistance  Additional Comments: Pt completes functional mobility from EOB to bathroom with CGA HHA and completes toilet transfer with CGA and toilet rail use.  Pt completes toileting hygiene independent and CGA clothing/gown mgt for safety. Pt then completed UB bath with set up while seated on toilet and LB bathing to include thighs and stood with CGA for per area washing. Pt then requires assist to wash below knees and feet with [place] providing education on use of LHBS to complete at MOD I level with good understanding verbalized. Pt dons hospital gown with A from [place] to tie only. Pt requires MOD A don slipper socks. Education provided on reacher use to thread underwear and pants and sock aid to don socks with fair understanding verbalized. Pt reports she will have home care at home and [place] explained to pt to have them work on LB dressing with the AE with good understanding verbalized. Pt then stood at sink or oral care and hair brushing tasks with SBA for safety. Pt then return to bedside recliner with hand held assist CGA. Balance  Sitting Balance: Supervision  Standing Balance: Contact guard assistance  Standing Balance  Time: ~8 minutes, ~4 mintues and ~1 minute  Activity: Static/dynamic standing at sink, functional mobility to/from bathroom, toileting tasks, bathing tasks from toilet level and grooming standing at sink. functional mobility from bathroom to recliner at bedside  Comment: Hand held Assist with cues to count to 3 prior to step away from surface to ensure safety, no dizziness, and to look at surroundings prior to walking to prevent falls, due to c-collar now in place, with fair+ follow through demonstrated.   Functional Mobility  Functional - Mobility Device: No device  Activity: To/from bathroom  Assist Level: Contact guard assistance  Toilet Transfers  Toilet - Technique: Ambulating  Equipment Used: Standard toilet  Toilet Transfer: Contact guard assistance  Toilet Transfers Comments: toilet rails used  Bed mobility  Supine to Sit: Contact guard assistance  Scooting: Contact guard assistance  Comment: Pt completes sup>sit with HOB slightly raised and bedrail use. Noted increased effort and maintaining precautions  Transfers  Sit to stand: Contact guard assistance  Stand to sit: Contact guard assistance  Transfer Comments: Min VCs for proper hand placement with use of RW during functional transfer; increased time/effort to perform   Cognition  Overall Cognitive Status: Exceptions  Arousal/Alertness: Appropriate responses to stimuli  Following Commands: Follows all commands without difficulty  Attention Span: Appears intact  Memory: Appears intact  Safety Judgement: Decreased awareness of need for assistance  Problem Solving: Able to problem solve independently  Insights: Decreased awareness of deficits      Plan   Plan  Times per week: 4-5x/wk  Current Treatment Recommendations: Balance Training,Functional Mobility Training,Endurance Training,Safety Education & Training,Patient/Caregiver Education & Training,Equipment Evaluation, Education, & procurement,Home Management Training,Self-Care / ADL    AM-PAC Score  AM-[facility] Inpatient Daily Activity Raw Score: 18 (03/24/22 1204)  AM-PAC Inpatient ADL T-Scale Score : 38.66 (03/24/22 1204)  ADL Inpatient CMS 0-100% Score: 46.65 (03/24/22 1204)  ADL Inpatient CMS G-Code Modifier : CK (03/24/22 1204)    Goals  Short term goals  Time Frame for Short term goals: Pt will, by discharge:  Short term goal 1: Perform bed mobility tasks independently  Short term goal 2: Perform functional transfers/functional mobility with mod IND using least restrictive AD  Short term goal 3: Perform ADL tasks with mod IND using AE/DME PRN  Short term goal 4:  Independently demo good safety awareness, including maintaining C-spine precautions, during engagement in all ADLs and functional transfers/functional mobility  Short term goal 5: Demo 10+ minutes standing tolerance with use of least restrictive AD for increased participation in ADL/IADL tasks       Therapy Time   Individual Concurrent Group Co-treatment   Time In 0925         Time Out 1010 Minutes 45         Timed Code Treatment Minutes: [address] [facility], [place]

## 2022-03-24 NOTE — DISCHARGE SUMMARY
DISCHARGE SUMMARY:    PATIENT NAME:  Naya James  YOB: 1945  MEDICAL RECORD NO. [id number]  DATE: 03/24/22  PRIMARY CARE PHYSICIAN: Janene Norman MD  ADMIT DATE:  3/22/2022    DISCHARGE DATE:  3/24/2022  DISPOSITION:  Home  ADMITTING DIAGNOSIS:   Fall    DIAGNOSIS:   Patient Active Problem List   Diagnosis    Transient cerebral ischemia    Essential hypertension    Hyperlipidemia    Primary osteoarthritis involving multiple joints    Hypothyroidism    Primary osteoarthritis of right shoulder    Morbidly obese (HCC)    Hair loss    Primary hypertension    Ric fracture with nonunion, subsequent encounter    Odontoid fracture ([facility] [address]    Anterolisthesis of cervical spine       CONSULTANTS:  DAYAN LLAMAS    PROCEDURES:   None    HOSPITAL COURSE:   Naya James is a 64 y.o. female who was admitted on 3/22/2022  Hospital Course:  fall from [facility]    Inj: C1 burst fx, odontoid fx    Labs and imaging were followed daily. On day of discharge Naya James  was tolerating a regular diet  had adequate analgeia on oral medications  had no signs of complication. She was deemed medically stable for discharged to Home        PHYSICAL EXAMINATION:        Discharge Vitals:  height is 4' 7\" (1.397 m) and weight is 167 lb (75.8 kg). Her oral temperature is 98.3 °F (36.8 °C). Her blood pressure is 132/86 and her pulse is 80. Her respiration is 19 and oxygen saturation is 92%. Exam on day of discharge:  GENERAL: No acute distress, alert and oriented  HEENT: normocephalic, atraumatic.   EMOI  NECK: Aspen collar in place  CV: Regular rate and rhythm  Pulm: Normal respiratory effort  ABD: Soft, nontender, nondistended, no rebound or guarding  Neuro: No focal deficits  MSK:  no deformities or injuries, moves all extremities equally  Skin: Warm, dry, cap refill <2  Psych: Normal mood    LABS:     Recent Labs     03/22/22  1050 03/23/22  0557   WBC 9.7 8.7   HGB 12.8 12.6   HCT 40.3 37.3    168    138 K 3.6* 3.4*    99   CO2 29 29   BUN 25* 18   CREATININE 0.57 0.57       DIAGNOSTIC TESTS:    XR CERVICAL SPINE (2-3 VIEWS)    Result Date: 3/22/2022  EXAMINATION: 4 XRAY VIEWS OF THE CERVICAL SPINE 3/22/2022 9:49 pm COMPARISON: CT cervical spine 03/22/2022. HISTORY: ORDERING SYSTEM PROVIDED HISTORY: standing or 90 deg seated lateral and open mouth odontoid TECHNOLOGIST PROVIDED HISTORY: standing or 90 deg seated lateral and open mouth odontoid FINDINGS: Evaluation limited by osseous demineralization. Grade 1 anterolisthesis of C3 on C4. The vertebral body heights are grossly preserved. The known odontoid and C1 fractures are not well visualized. The atlantoaxial junction is grossly intact. The paravertebral soft tissues are unremarkable. Nonvisualization of the known C1 and odontoid fractures. CT Head WO Contrast    Result Date: 3/22/2022  EXAMINATION: CT OF THE HEAD WITHOUT CONTRAST  3/22/2022 11:15 am TECHNIQUE: CT of the head was performed without the administration of intravenous contrast. Dose modulation, iterative reconstruction, and/or weight based adjustment of the mA/kV was utilized to reduce the radiation dose to as low as reasonably achievable. COMPARISON: None. HISTORY: ORDERING SYSTEM PROVIDED HISTORY: fell and hit head recently TECHNOLOGIST PROVIDED HISTORY: fell and hit head recently Decision Support Exception - unselect if not a suspected or confirmed emergency medical condition->Emergency Medical Condition (MA) FINDINGS: BRAIN/VENTRICLES: There is no acute intracranial hemorrhage, mass effect or midline shift. No abnormal extra-axial fluid collection. The gray-white differentiation is maintained without evidence of an acute infarct. Focal old lacunar infarct is noted in the left caudate nucleus. There is no evidence of hydrocephalus. ORBITS: The bilateral globes are intact.  SINUSES: Minimal mucoperiosteal thickening of the left maxillary sinus and bilateral ethmoid air cells is seen. SOFT TISSUES/SKULL:  No acute abnormality of the visualized skull or soft tissues. 06/17/2020     CT CERVICAL SPINE WO CONTRAST    Result Date: 3/22/2022  EXAMINATION: CT OF THE CERVICAL SPINE WITHOUT CONTRAST 3/22/2022 9:35 am TECHNIQUE: CT of the cervical spine was performed without the administration of intravenous contrast. Multiplanar reformatted images are provided for review. Dose modulation, iterative reconstruction, and/or weight based adjustment of the mA/kV was utilized to reduce the radiation dose to as low as reasonably achievable. COMPARISON: None. HISTORY: ORDERING SYSTEM PROVIDED HISTORY: Acute neck pain FINDINGS: BONES/ALIGNMENT: There are nondisplaced fractures of the C1 ring, involving the midline anterior arch and bilateral posterior arches (Ric burst fracture, type 3). There is also a type 2 odontoid fracture with minimal associated angulation. There is mild anterolisthesis at C3-C4, measuring 0.3 cm. There is mild anterolisthesis at C4-C5, measuring 0.3 cm. There is anterolisthesis at C7-T1, measuring 0.4 cm. This is likely due to severe facet arthropathy at multiple levels. No additional fractures or facet subluxation is observed. DEGENERATIVE CHANGES: There is severe multilevel disc space narrowing, endplate spurring, and facet arthropathy. There is moderate multilevel neural foraminal narrowing. Large bony spurs at the C6-C7 level results in at least moderate spinal canal narrowing. SOFT TISSUES: There is mild prevertebral soft tissue swelling at the level of the odontoid process, measuring 0.8 cm. 1.  Nondisplaced Ric burst fracture of the C1 atlas, involving midline anterior and bilateral posterior arches. Consider MRI to assess for soft tissue injury. 2.  Type 2 odontoid fracture with mild apex ventral angulation. 3.  Mild prevertebral soft tissue swelling at the level of the odontoid process.  4.  Mild anterolisthesis at C3-C4, C4-C5, and C7-T1, likely degenerative. 5.  Severe multilevel degenerative changes with at least moderate spinal canal narrowing at C6-C7. Critical results were called by Dr. Parker Wheatley to Dr. Jacques Herrera On 3/22/2022 at 10:11. MRI CERVICAL SPINE WO CONTRAST    Result Date: 3/23/2022  EXAMINATION: MRI OF THE CERVICAL SPINE WITHOUT CONTRAST 3/22/2022 6:09 pm TECHNIQUE: Multiplanar multisequence MRI of the cervical spine was performed without the administration of intravenous contrast. COMPARISON: CT 03/22/2022 HISTORY: ORDERING SYSTEM PROVIDED HISTORY: C1/2 fx TECHNOLOGIST PROVIDED HISTORY: C1/2 fx Decision Support Exception - unselect if not a suspected or confirmed emergency medical condition->Emergency Medical Condition (MA) Reason for Exam: C1/2 fx FINDINGS: BONES/ALIGNMENT: Abnormal edema involving the base of the odontoid consistent with type 2 fracture. Edema involving the C1-C2 articulation predental space likely corresponding to the C1-C2 injury. No distraction of the fracture fragments identified. No abnormal signal identified along the transverse ligament on the axial images provided. Otherwise no abnormal bone marrow edema elsewhere. SPINAL CORD: No cord contusion or hemorrhage identified. There is cord flattening at multiple levels likely due to the canal narrowing due to multilevel spondylosis. This is most notably at C5-C6, C6-C7, C7-T1. SOFT TISSUES: There is prevertebral thickeningextending from the clivus to C4 due to prevertebral edema. Additionally, there is mild edema within the posterior soft tissues extending from the occiput to C5. C2-C3: Moderate to severe disc space disease. Moderate circumferential disc bulge. Severe facet arthropathy and ligamentum flavum hypertrophy identified. There is moderate bilateral foraminal narrowing and moderate canal narrowing. C3-C4: Moderate disc space disease. Diffuse osteophyte complex formation and uncovertebral spurring.   Mild to moderate bilateral foraminal NECK W CONTRAST    Result Date: 3/22/2022  EXAMINATION: CTA OF THE NECK 3/22/2022 4:44 pm TECHNIQUE: CTA of the neck was performed with the administration of intravenous contrast. Multiplanar reformatted images are provided for review. MIP images are provided for review. Stenosis of the internal carotid arteries measured using NASCET criteria. Dose modulation, iterative reconstruction, and/or weight based adjustment of the mA/kV was utilized to reduce the radiation dose to as low as reasonably achievable. COMPARISON: Same-day CT cervical spine HISTORY: ORDERING SYSTEM PROVIDED HISTORY: trauma TECHNOLOGIST PROVIDED HISTORY: trauma Decision Support Exception - unselect if not a suspected or confirmed emergency medical condition->Emergency Medical Condition (MA) FINDINGS: AORTIC ARCH/ARCH VESSELS: No dissection or arterial injury. No significant stenosis of the brachiocephalic or subclavian arteries. CAROTID ARTERIES: No dissection, arterial injury, or hemodynamically significant stenosis by NASCET criteria. VERTEBRAL ARTERIES: No dissection or significant stenosis. There is intimal irregularity in the right vertebral artery the most pronounced at C5-6. SOFT TISSUES: The lung apices are clear. No cervical or superior mediastinal lymphadenopathy. The larynx and pharynx are unremarkable. No acute abnormality of the salivary and thyroid glands. BONES: Please see same day CT cervical spine for further detail in the multiple cervical fractures present. 1. Intimal irregularity in the right vertebral artery. This could be atherosclerotic in nature, but grade 1 blunt cerebrovascular injury cannot be excluded. No significant stenosis. 2. No significant stenosis or occlusion of the cervical vasculature.      CT CHEST ABDOMEN PELVIS W CONTRAST    Result Date: 3/22/2022  EXAMINATION: CT OF THE CHEST, ABDOMEN, AND PELVIS WITH CONTRAST 3/22/2022 4:44 pm TECHNIQUE: CT of the chest, abdomen and pelvis was performed with the administration of intravenous contrast. Multiplanar reformatted images are provided for review. Dose modulation, iterative reconstruction, and/or weight based adjustment of the mA/kV was utilized to reduce the radiation dose to as low as reasonably achievable. COMPARISON: CT cervical spine earlier the same day. HISTORY: ORDERING SYSTEM PROVIDED HISTORY: trauma TECHNOLOGIST PROVIDED HISTORY: trauma Decision Support Exception - unselect if not a suspected or confirmed emergency medical condition->Emergency Medical Condition (MA) C1 burst fracture. Type 2 odontoid fracture with angulation. Patient tripped over an appliance cord yesterday striking the side of her head. 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 No acute aortic abnormality. No mediastinal adenopathy. Heart is mildly enlarged with mild coronary artery calcification. Trace pericardial fluid without epicardial adenopathy. Small hiatal hernia. Lungs/pleura: Basilar atelectasis is noted. No extrapleural air or appreciable effusion. No suspicious pulmonary nodules. Tracheobronchial tree is patent. Soft Tissues/Bones: Moderate dextroscoliotic curvature thoracic spine. Patient has moderately severe spondylosis of the thoracic spine with sequential non marginal bridging osteophytes. Right shoulder arthroplasty. Abdomen/Pelvis: Organs: Liver is mildly fatty infiltrated without focal mass or ductal dilatation. Gallbladder demonstrates a Phrygian cap and is mildly dilated. Within the Phrygian cap of the gallbladder is lamellated gallstone of approximately 13 mm. Spleen, pancreas, adrenals, and kidneys demonstrate no acute abnormality. No ureteral dilatation. GI/Bowel: No free fluid, free air, bowel obstruction or bowel wall thickening is noted. Patient has a complex periumbilical hernia of 6 cm size containing the tip of a bowel loop and mostly fat without strangulation.   Moderate-sized diverticula are scattered throughout the colon without evidence of acute diverticulitis. Pelvis: Appendix is visualized. No appendicitis. Uterus is abnormal in appearance. The uterus is heterogeneous and the endometrium is abnormally thickened at 18 mm. Bladder is unremarkable. Patient has a left fat containing inguinal hernia of 2.5 cm without strangulation. No free pelvic fluid, pelvic or inguinal adenopathy is seen. Peritoneum/Retroperitoneum: Scattered calcified plaque is noted. No aneurysm or dissection. No retroperitoneal mass, retroperitoneal or mesenteric adenopathy is noted. Bones/Soft Tissues: Arthritic changes are present in the hips, and symphysis pubis. Grade 2 anterolisthesis L4 upon L5 is noted with severe canal stenosis L4-L5 and severe facet arthropathy lower lumbar region. Retrolisthesis L1 on L2 is noted with canal stenosis, moderate at this level. Scoliotic curvature is noted with significant spondylosis. Significant levoscoliotic curvature with rotation. No acute fracture. 1. 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 Coronary artery disease. No effusion or extrapleural air. Extensive at least moderate spondylosis thoracic spine. 2.  CT abdomen and pelvis: No bowel obstruction. Patient has a dilated gallbladder with gallstone. Periumbilical hernia containing fat and bowel without strangulation. Abnormal appearing uterus, enlarged for age with thickened endometrium. Left inguinal fat containing hernia without strangulation. Severe scoliotic curvature in the lumbar spine with multilevel advanced degenerative change, neural foraminal and canal stenosis fully described above without evidence of acute fracture.      CT LUMBAR SPINE TRAUMA RECONSTRUCTION    Result Date: 3/22/2022  EXAMINATION: CT OF THE LUMBAR SPINE WITHOUT CONTRAST  3/22/2022 TECHNIQUE: CT of the lumbar spine was performed without the administration of intravenous contrast. Multiplanar reformatted images are provided for review. Adjustment of mA and/or kV according to patient size was utilized. Dose modulation, iterative reconstruction, and/or weight based adjustment of the mA/kV was utilized to reduce the radiation dose to as low as reasonably achievable. COMPARISON: Lumbar radiograph series 06/25/2014 HISTORY: Low back pain following trauma FINDINGS: BONES/ALIGNMENT: Chronic grade 2 anterolisthesis L4 on L5 and grade 1 anterolisthesis L5 on S1. Leftward listhesis L4 on L5. The vertebral body heights are maintained. No osseous destructive lesion is seen. Lower lumbar levorotatory scoliosis as seen previously. DEGENERATIVE CHANGES: Diffuse moderately severe degenerative changes of the lumbar spine. SOFT TISSUES/RETROPERITONEUM: No paraspinal mass is seen. 1. No acute lumbar fracture evident. 2. Chronic grade 2 anterolisthesis L4 on L5 and grade 1 anterolisthesis L5 on S1. 3. Leftward listhesis L4 on L5. 4. Advanced degenerative changes appear similar to prior study, seen throughout the lumbar spine. 5. Lower lumbar levorotatory scoliosis. If pain persists or worsens, then additional characterization with MRI lumbar spine may be indicated. CT THORACIC SPINE TRAUMA RECONSTRUCTION    Result Date: 3/22/2022  EXAMINATION: CT OF THE THORACIC SPINE WITHOUT CONTRAST  3/22/2022 1:44 pm: TECHNIQUE: CT of the thoracic spine was performed without the administration of intravenous contrast. Multiplanar reformatted images are provided for review. Dose modulation, iterative reconstruction, and/or weight based adjustment of the mA/kV was utilized to reduce the radiation dose to as low as reasonably achievable. COMPARISON: None. HISTORY: ORDERING SYSTEM PROVIDED HISTORY: trauma TECHNOLOGIST PROVIDED HISTORY: trauma FINDINGS: BONES/ALIGNMENT: Coronal images demonstrate dextroscoliosis within the thoracic region. No traumatic malalignment is present. .  The vertebral body heights are maintained. No osseous destructive lesion is seen. DEGENERATIVE CHANGES: No gross spinal canal stenosis or bony neural foraminal narrowing of the thoracic spine. Multilevel degenerative changes are present throughout the thoracic spine. SOFT TISSUES: No paraspinal mass is seen. Bibasilar opacities are present, addressed on the dedicated CT scan of the chest.  Coronary arterial calcifications are present. No evidence of an acute fracture or traumatic malalignment involving the thoracic spine. DISCHARGE INSTRUCTIONS     Discharge Medications:        Medication List        START taking these medications      oxyCODONE 5 MG immediate release tablet  Commonly known as: ROXICODONE  Take 0.5 tablets by mouth every 6 hours as needed for Pain for up to 7 days. CONTINUE taking these medications      aspirin 81 MG chewable tablet     CALCIUM 600 PO     hydroCHLOROthiazide 25 MG tablet  Commonly known as: HYDRODIURIL  Take 1 tablet by mouth every other day     levothyroxine 88 MCG tablet  Commonly known as: SYNTHROID  Take 1 tablet by mouth daily     lisinopril 10 MG tablet  Commonly known as: PRINIVIL;ZESTRIL  Take 1 tablet by mouth daily     PreserVision AREDS 2 Caps     TYLENOL 500 MG tablet  Generic drug: acetaminophen     Vitamin D3 50 MCG (2000 UT) Caps               Where to Get Your Medications        [facility] [address] [facility], [address], [facility] [address]      Phone: [phone]   oxyCODONE 5 MG immediate release tablet       Diet: ADULT DIET; Regular diet as tolerated  Activity: As instructed WEIGHT BEARING STATUS: Weight bearing as tolerated  Wound Care: Daily and as needed.     DISPOSITION: Home    Follow-up:  ЕЛЕНА Martinez - CNP  [address] [place] [address], [address] Justin Smith  [address] [facility]  [phone]    In 6 weeks  followup for cervical xrays, NS and EVNS follow up    Janene Norman MD  [address] [facility]  [phone]    Schedule an appointment as soon as possible for a visit in 2 weeks  Post Hospitalization check up.     Jerel Robledo MD  [address] [facility] [address] [facility]    In 4 weeks      Angie Barton MD  [address]  [phone]    In 3 months          SIGNED:  Alma Rosa Kaiser, ЕЛЕНА - CNP   3/24/2022, 2:32 PM  Time Spent for discharge: <30 minutes  Discharge criteria reviewed with team.

## 2022-03-24 NOTE — PLAN OF CARE
Problem: Falls - Risk of:  Goal: Will remain free from falls  Description: Will remain free from falls  Outcome: Completed  Goal: Absence of physical injury  Description: Absence of physical injury  Outcome: Completed     Problem: Pain:  Goal: Pain level will decrease  Description: Pain level will decrease  Outcome: Completed  Goal: Control of acute pain  Description: Control of acute pain  Outcome: Completed  Goal: Control of chronic pain  Description: Control of chronic pain  Outcome: Completed     Problem: Musculor/Skeletal Functional Status  Goal: Highest potential functional level  Outcome: Completed

## 2022-03-25 ENCOUNTER — CARE COORDINATION (OUTPATIENT)
Dept: CASE MANAGEMENT | Age: 77
End: 2022-03-25

## 2022-03-25 ENCOUNTER — TELEPHONE (OUTPATIENT)
Dept: PRIMARY CARE CLINIC | Age: 77
End: 2022-03-25

## 2022-03-25 DIAGNOSIS — S12.01XK: Primary | ICD-10-CM

## 2022-03-25 NOTE — TELEPHONE ENCOUNTER
Home [facility] called and said that Santiago Pandya saw nursing today and wanted to see if a nursing aid was ok. I asked  and he said that was ok.

## 2022-03-25 NOTE — CARE COORDINATION
Darvin [facility] Transitions Initial Follow Up Call    Call within 2 business days of discharge: Yes    Patient: Martha Glass Patient : 1945   MRN: <[id number]>  Reason for Admission: FX of C1 and C2    Discharge Date: 3/24/22 RARS: Readmission Risk Score: 10.9 ( )      Last Discharge [address]       Complaint Diagnosis Description Type Department Provider    3/22/22 Neck Pain Closed unstable burst fracture of first cervical vertebra, initial encounter ([facility] [address] . .. ED to Hosp-Admission (Discharged) (ADMITTED) [facility] 4B Judson Vail MD; [place]on Fruits. .. 3/22/22 Fall Closed unstable burst fracture of first cervical vertebra, initial encounter ([facility] [address] . .. ED (TRANSFER) [facility] ED Nasima Sierra MD           Spoke with: Transitions of Care Initial Call:spoke to Jeffrey Marcus the role of Care Transition Nurse and the Transition program, patient is agreeable to follow up calls Post discharge from the [facility] Heidi states she has neck pain rated 5-6 /10. Wearing a neck brace. Takes Roxicodone PRN with effect. Appetite is fair. Bowel and bladder WNL. Uses a walker to ambulate. 1111f medication reconciliation competed. Taking all medications as prescribed. Discussed scheduling a 7 day f/u with PCP. Pt states she will call to schedule. Discussed scheduled appt with neurology. Pt is aware and has transportation to appts. Called to [facility] to verify referral and SOC. Will continue to follow. Patient is aware of when to contact MD with any new or worsening symptoms. Advised to contact PCP  with any health concerns for early outpatient intervention in an effort to avoid hospitalization. Report any worsening symptoms to PCP and/or Call 911 and/or GO TO EMERGENCY ROOM if symptoms are severe. Expresses understanding. Transitions of Care Initial Call    Was this an external facility discharge?  No Discharge Facility: n/a    Challenges to be reviewed by the provider   Additional needs identified to be addressed with provider: No  none             Method of communication with provider : none      Advance Care Planning:   Does patient have an Advance Directive: reviewed and current. Was this a readmission? No  Patient stated reason for admission: neck pain  Patients top risk factors for readmission: medical condition-sancho fx of C1 and C2    Care Transition Nurse (CTN) contacted the patient by telephone to perform post hospital discharge assessment. Verified name and  with patient as identifiers. Provided introduction to self, and explanation of the CTN role. CTN reviewed discharge instructions, medical action plan and red flags with patient who verbalized understanding. Patient given an opportunity to ask questions and does not have any further questions or concerns at this time. Were discharge instructions available to patient? Yes. Reviewed appropriate site of care based on symptoms and resources available to patient including: PCP  Specialist  Home health  When to call [phone]. The patient agrees to contact the PCP office for questions related to their healthcare. Medication reconciliation was performed with patient, who verbalizes understanding of administration of home medications. Advised obtaining a 90-day supply of all daily and as-needed medications. Covid Risk Education     Educated patient about risk for severe COVID-19 due to risk factors according to CDC guidelines. LPN CC reviewed discharge instructions, medical action plan and red flag symptoms with the patient who verbalized understanding. Discussed COVID vaccination status: Yes. Education provided on COVID-19 vaccination as appropriate. Discussed exposure protocols and quarantine with CDC Guidelines. Patient was given an opportunity to verbalize any questions and concerns and agrees to contact LPN CC or health care provider for questions related to their healthcare.     Reviewed and educated patient on any new and changed medications related to discharge diagnosis. Was patient discharged with a pulse oximeter? Yes and No Discussed and confirmed pulse oximeter discharge instructions and when to notify provider or seek emergency care. LPN CC provided contact information. Plan for follow-up call in 5-7 days based on severity of symptoms and risk factors.   Plan for next call: symptom management-neck pain  self management-wear neck brace as instructed  follow up appointment-with PCP      Facility: [address] [place] [address]    Non-face-to-face services provided:  Obtained and reviewed discharge summary and/or continuity of care documents  Communication with home health agencies or other community services the patient is currently using-[facility]    Care Transitions 24 Hour Call    Were you discharged with any Home Care or Post Acute Services: Yes  Post Acute Services: Home Health (Comment: [facility])  Care Transitions Interventions         Follow Up  Future Appointments   Date Time Provider Jorge A Martin   4/22/2022 12:30 PM Lida Rosario MD Neuro Endo [facility]   5/4/2022 10:30 AM [address], APRN - CNP [facility] Neuro [address] LPN Care Transitions Nurse   [phone]

## 2022-03-31 ENCOUNTER — CARE COORDINATION (OUTPATIENT)
Dept: CASE MANAGEMENT | Age: 77
End: 2022-03-31

## 2022-03-31 NOTE — CARE COORDINATION
Darvin 45 Transitions Follow Up Call    3/31/2022    Patient: Wade Flannery  Patient : 1945   MRN: <[id number]>  Reason for Admission: [facility] fracture with nonunion, subsequent encounter    Discharge Date: 3/24/22 RARS: Readmission Risk Score: 10.9 ( )         Spoke with: Subsequent transitional call. Spoke to : Ksenia Watkins . Ksenia Watkins states she has [facility] that comes to check her vitals and gives exercises for her to do. Wears a neck brace. Pain rated 5/10. Appetite is fair. Difficulty chewing with neck brace on. Pt uses a wallker to ambulate. She has an appt with her PCP on 22. Pt states her son will transport. willl continue to follow. Care Transitions Follow Up Call     Needs to be reviewed by the provider   Additional needs identified to be addressed with provider: No  none             Method of communication with provider : none      Care Transition Nurse (CTN) contacted the patient by telephone to follow up after admission on 3/22/22. Verified name and  with patient as identifiers. Addressed changes since last contact: none  Discussed follow-up appointments. If no appointment was previously scheduled, appointment scheduling offered: No.   Is follow up appointment scheduled within 7 days of discharge? No.    Advance Care Planning:   Does patient have an Advance Directive: reviewed and current. CTN reviewed discharge instructions, medical action plan and red flags with patient and discussed any barriers to care and/or understanding of plan of care after discharge. Discussed appropriate site of care based on symptoms and resources available to patient including: PCP  Specialist  Home health  When to call [address] The patient agrees to contact the PCP office for questions related to their healthcare.      Patients top risk factors for readmission: medical condition-cervical fracture  Interventions to address risk factors: Obtained and reviewed discharge summary and/or continuity of care documents      Non-[facility] follow up appointment(s): n/a    CTN provided contact information for future needs. Plan for follow-up call in 7-10 days based on severity of symptoms and risk factors. Plan for next call: symptom management-neck pain   follow up appointment-with PCP          Care Transitions Subsequent and Final Call    Subsequent and Final Calls  Are you currently active with any services?: Home Health  Care Transitions Interventions  Other Interventions:            Follow Up  Future Appointments   Date Time Provider Jorge A Martin   4/5/2022  8:00 AM Judith Skinner MD [facility]   4/29/2022 12:30 PM Tobi Xiao MD Neuro Endo [facility]   5/4/2022 10:30 AM [address], APRN - [facility] Neuro [address], LPN Mariette Gaucher LPN Care Transitions Nurse   [phone]

## 2022-04-05 ENCOUNTER — OFFICE VISIT (OUTPATIENT)
Dept: PRIMARY CARE CLINIC | Age: 77
End: 2022-04-05
Payer: COMMERCIAL

## 2022-04-05 VITALS
DIASTOLIC BLOOD PRESSURE: 80 MMHG | BODY MASS INDEX: 36.44 KG/M2 | HEART RATE: 72 BPM | WEIGHT: 162 LBS | SYSTOLIC BLOOD PRESSURE: 127 MMHG | HEIGHT: 56 IN | RESPIRATION RATE: 16 BRPM

## 2022-04-05 DIAGNOSIS — I10 ESSENTIAL HYPERTENSION: ICD-10-CM

## 2022-04-05 DIAGNOSIS — S12.100G CLOSED ODONTOID FRACTURE WITH DELAYED HEALING, SUBSEQUENT ENCOUNTER: Primary | ICD-10-CM

## 2022-04-05 DIAGNOSIS — S12.01XK: ICD-10-CM

## 2022-04-05 PROCEDURE — 1111F DSCHRG MED/CURRENT MED MERGE: CPT | Performed by: FAMILY MEDICINE

## 2022-04-05 PROCEDURE — 99211 OFF/OP EST MAY X REQ PHY/QHP: CPT | Performed by: FAMILY MEDICINE

## 2022-04-05 PROCEDURE — 99495 TRANSJ CARE MGMT MOD F2F 14D: CPT | Performed by: FAMILY MEDICINE

## 2022-04-05 NOTE — PROGRESS NOTES
Patient is here today for follow-up on hospitalization for cervical fracture . Symptoms have -improved  Current complaints-states the neck brace is very uncomfortable  Medication change-none  Follow-up with specialists-has PT and OT coming to her home. Special needs-none at this time. States no one has yet told her how long she needs to wear her brace. Post-Discharge Transitional Care Follow Up      Kellie Thomas   YOB: 1945    Date of Office Visit:  4/5/2022  Date of Hospital Admission: 3/22/22  Date of Hospital Discharge: 3/24/22  Readmission Risk Score (high >=14%. Medium >=10%):Readmission Risk Score: 10.9 ( )      Care management risk score Rising risk (score 2-5) and Complex Care (Scores >=6): 1     Non face to face  following discharge, date last encounter closed (first attempt may have been earlier): 3/25/2022  2:16 PM     Call initiated 2 business days of discharge: Yes     Closed odontoid fracture with delayed healing, subsequent encounter- continue collar,discussed fall prevention,continue pt and ot  [facility] fracture with nonunion, subsequent encounter  Essential hypertension- well controlled      Medical Decision Making: high complexity  No follow-ups on file. On this date 4/5/2022 I have spent 40 minutes reviewing previous notes, test results and face to face with the patient discussing the diagnosis and importance of compliance with the treatment plan as well as documenting on the day of the visit. Subjective:   HPI    Inpatient course: Discharge summary reviewed- see chart. Interval history/Current status: has occasional tingling and numbness of both hands -trisient,no weakness. No fall. She is getting pt and ot at home,keeping her collar on.     Patient Active Problem List   Diagnosis    Transient cerebral ischemia    Essential hypertension    Hyperlipidemia    Primary osteoarthritis involving multiple joints    Hypothyroidism    Primary osteoarthritis of right shoulder    Morbidly obese ([facility] [address]    Hair loss    Primary hypertension    Ric fracture with nonunion, subsequent encounter    Odontoid fracture ([facility] [address]    Anterolisthesis of cervical spine    Vertebral artery dissection (HCC)       Medications listed as ordered at the time of discharge from hospital     Medication List          Accurate as of April 5, 2022  8:19 AM. If you have any questions, ask your nurse or doctor. CONTINUE taking these medications    aspirin 81 MG chewable tablet     CALCIUM 600 PO     hydroCHLOROthiazide 25 MG tablet  Commonly known as: HYDRODIURIL  Take 1 tablet by mouth every other day     levothyroxine 88 MCG tablet  Commonly known as: SYNTHROID  Take 1 tablet by mouth daily     lisinopril 10 MG tablet  Commonly known as: PRINIVIL;ZESTRIL  Take 1 tablet by mouth daily     PreserVision AREDS 2 Caps     TYLENOL 500 MG tablet  Generic drug: acetaminophen     Vitamin D3 50 MCG (2000 UT) Caps             Medications marked \"taking\" at this time  No outpatient medications have been marked as taking for the 4/5/22 encounter (Office Visit) with Dannie Luis MD.        Medications patient taking as of now reconciled against medications ordered at time of hospital discharge: Yes    Review of Systems   Has occasional tingling and numbness of hands,no weakness    Objective:    /80 (Site: Left Upper Arm, Position: Sitting)   Pulse 72   Resp 16   Ht 4' 7.75\" (1.416 m)   Wt 162 lb (73.5 kg)   BMI 36.65 kg/m²   Physical Exam    Exam:  GEN:   A & O x3, no apparent distress  EYES: No gross abnormalities. ENT:has cervical collar  PULM: clear to auscultation bilaterally- no wheezes, rales or rhonchi, normal air movement, no respiratory distress  COR: regular rate & rhythm and no gallops  ABD:  soft, non-tender  : deferred  EXT: Extremities: + 2 pedal pulses, no edema or calf tenderness, and warm to touch.  Normal nails without lesions  Skeletomuscular:has multiple deformed joints  NEURO: Motor and sensory grossly intact  SKIN:  No skin lesions or rashes    Patient needs shower chair due to her inability to take shower with neck brace for her cervical spine fx  An electronic signature was used to authenticate this note.   --Valentín Zhu MD

## 2022-04-07 ENCOUNTER — CARE COORDINATION (OUTPATIENT)
Dept: CASE MANAGEMENT | Age: 77
End: 2022-04-07

## 2022-04-07 NOTE — CARE COORDINATION
[facility] 45 Transitions Follow Up Call    2022    Patient: Sekou Berman  Patient : 1945   MRN: [id number]  Reason for Admission: Cervical fracture  Discharge Date: 3/24/22 RARS: Readmission Risk Score: 10.9 ( )         Spoke with: [address] Transitions Follow Up Call    Spoke to Mireille RAY for transitions f/u call. Pt went to PCP on 22, no changes. Stated she is wearing neck brace continuously, still having some difficulty with chewing with brace on. Will contact Neuro Endo  to check if OK to loosen brace for meals. Pt continues with [facility]. No other concerns at this time. Attempted twice to speak to Biotz MA, disconnected twice. Left  3rd time requesting call back. Received call back from Neuro Endo, informed pt can take her cervical collar off to eat. Attempted to call pt back to update her. Phone rings multiple times, no answer. Will re attmept later to update pt. Needs to be reviewed by the provider   Additional needs identified to be addressed with provider: No  none             Method of communication with provider : none      Care Transition Nurse (CTN) contacted the patient by telephone to follow up after admission on 3/22/22. Verified name and  with patient as identifiers. Addressed changes since last contact: wearing neck brace, has difficulty chewing with brace on  Discussed follow-up appointments. CTN reviewed discharge instructions, medical action plan and red flags with patient and discussed any barriers to care and/or understanding of plan of care after discharge. Discussed appropriate site of care based on symptoms and resources available to patient including: PCP  Specialist  Home health  When to call [address] The patient agrees to contact the PCP office for questions related to their healthcare.      Patients top risk factors for readmission: falls  Interventions to address risk factors: Obtained and reviewed discharge summary and/or continuity of care documents and Communication with specialists who will assume or re-assume care of the patient's system-specific problems-Neuroscience Dr [facility] Marleny JOHNSON contacted regarding neck brace        CTN provided contact information for future needs. Plan for follow-up call in 5-7 days based on severity of symptoms and risk factors. Plan for next call: symptom management-neck pain, brace        Care Transitions Subsequent and Final Call    Subsequent and Final Calls  Do you have any ongoing symptoms?: No  Have your medications changed?: No  Do you have any questions related to your medications?: No  Do you currently have any active services?: Yes  Are you currently active with any services?: Home Health  Do you have any needs or concerns that I can assist you with?: Yes  Patient-reported Needs or Concerns: wants to know if OK to loosen neck bracr for eating  Identified Barriers: None  Care Transitions Interventions  Other Interventions:            Follow Up  Future Appointments   Date Time Provider Jorge A Martin   4/29/2022 12:30 PM Daya Garcia MD Neuro Endo [facility]   5/4/2022 10:30 AM ЕЛЕНА Guardado - CNP [facility] [place]   5/6/2022  8:15 AM Deidra Denny MD [facility]       Nelia Motley RN

## 2022-04-08 ENCOUNTER — CARE COORDINATION (OUTPATIENT)
Dept: CASE MANAGEMENT | Age: 77
End: 2022-04-08

## 2022-04-08 NOTE — CARE COORDINATION
Dar[facility] 45 Transitions Follow Up Call    2022    Patient: Karen Cabrales  Patient : 1945   MRN: [id number]  Reason for Admission: Cervical Fracture  Discharge Date: 3/24/22 RARS: Readmission Risk Score: 10.9 ( )         Spoke with: [employer] Agent that per Dr Kaitlin Pierson off ice, pt may take cervical neck brace off while she eats. Patient verbalized understanding. Pt has appt with Dr Tammy Ordoñez on 22. Care Transitions Subsequent and Final Call    Subsequent and Final Calls  Care Transitions Interventions  Other Interventions:            Follow Up  Future Appointments   Date Time Provider Jorge A Sherwoodisti   2022 12:30 PM Monae Michel MD Neuro Endo [facility]   2022 10:30 AM ЕЛЕНА Bhatt - CNP [facility] [address] [facility]   2022  8:15 AM Deidra Koroma MD [facility]       Ellis Harden RN

## 2022-04-11 ENCOUNTER — TELEPHONE (OUTPATIENT)
Dept: PRIMARY CARE CLINIC | Age: 77
End: 2022-04-11

## 2022-04-11 DIAGNOSIS — S12.01XK: Primary | ICD-10-CM

## 2022-04-11 DIAGNOSIS — S12.100G CLOSED ODONTOID FRACTURE WITH DELAYED HEALING, SUBSEQUENT ENCOUNTER: ICD-10-CM

## 2022-04-11 NOTE — TELEPHONE ENCOUNTER
Received a phone call from Aleks bernard at [facility]. Patient would like a shower chair with arms and a C Collar with no foam so that she can shower. Aleks bernard will fax a picture of the requested items. Patient does not have a preference for DME provider.

## 2022-04-11 NOTE — TELEPHONE ENCOUNTER
Order for shower chair placed. Dr. Shea Wilkerson can you please addend her last office note to document the need for her shower chair.

## 2022-04-12 NOTE — TELEPHONE ENCOUNTER
Order for shower chair faxed to [employer]. Notified Gisselle at [facility] that Dr. Diana Weathers ordered bath chair, but Neurosurgery will need to order C-collar. Patient notified that order for bath chair was sent to [employer]. Informed Patient that her neurosurgeon will need to order the C-collar. Patient verbalizes understanding.

## 2022-04-14 ENCOUNTER — CARE COORDINATION (OUTPATIENT)
Dept: CASE MANAGEMENT | Age: 77
End: 2022-04-14

## 2022-04-14 NOTE — CARE COORDINATION
Darvin 45 Transitions Follow Up Call    2022    Patient: Yahaira Shi  Patient : 1945    MRN: [id number]  Reason for Admission: [facility] fracture with nonunion, subsequent encounter    Discharge Date: 3/24/22 RARS: Readmission Risk Score: 10.9 ( )         Spoke with: [facility] Transitions Subsequent and Final Call    Subsequent and Final Calls  Do you have any ongoing symptoms?: Yes  Onset of Patient-reported symptoms: Other  Patient-reported symptoms: Other  Interventions for patient-reported symptoms: Other  Have your medications changed?: No  Do you have any questions related to your medications?: No  Do you currently have any active services?: Yes  Are you currently active with any services?: Home Health  Do you have any needs or concerns that I can assist you with?: No  Care Transitions Interventions  Other Interventions:       Reports that the brace is the reason she is  uncomfortable . Denies pain. Takes Tylenol occasionally. Denies weakness to extremities, numbness or tingling, blurred vision, dizziness. Occasional H/A's, relieved with Tylenol. Eating with brace loosened. Sleeping OK. Son lives with Debbie Hernández. HHC is done. Will see the surgeon in 2 weeks as below. Care Transitions Episode Closed. Care Transitions Follow Up Call    Needs to be reviewed by the provider   Additional needs identified to be addressed with provider: No  none             Method of communication with provider : none      Care Transition Nurse (CTN) contacted the patient by telephone to follow up after admission. Verified name and  with patient as identifiers. Addressed changes since last contact: none  Discussed follow-up appointments. If no appointment was previously scheduled, appointment scheduling offered: No.   Is follow up appointment scheduled within 7 days of discharge? Yes.     Advance Care Planning:   Does patient have an Advance Directive: reviewed and current, reviewed and needs to be updated, not on file; education provided, not on file, patient declined education, decision maker updated and referral to internal ACP facilitator. CTN reviewed discharge instructions, medical action plan and red flags with patient and discussed any barriers to care and/or understanding of plan of care after discharge. Discussed appropriate site of care based on symptoms and resources available to patient including: PCP  Specialist. The patient agrees to contact the PCP office for questions related to their healthcare. Patients top risk factors for readmission: medical condition-S/P Ric fracture with nonunion, subsequent encounter  Interventions to address risk factors: Assessment and support for treatment adherence and medication management-Assessed for needs. CTN provided contact information for future needs. No further follow-up call indicated based on severity of symptoms and risk factors. Plan for next call: Completed Care Transitions Episode.             Follow Up  Future Appointments   Date Time Provider Jorge A Martin   4/29/2022 12:30 PM Ann Ravi MD Neuro Endo [facility]   5/4/2022 10:30 AM ЕЛЕНА Chen - CNP [facility] [place]   5/6/2022  8:15 AM Izabela Rooney MD [facility]       Zelda Blount RN

## 2022-04-28 ENCOUNTER — HOSPITAL ENCOUNTER (OUTPATIENT)
Dept: GENERAL RADIOLOGY | Age: 77
Discharge: HOME | End: 2022-04-30
Payer: COMMERCIAL

## 2022-04-28 ENCOUNTER — HOSPITAL ENCOUNTER (OUTPATIENT)
Age: 77
Discharge: HOME | End: 2022-04-30
Payer: COMMERCIAL

## 2022-04-28 DIAGNOSIS — S12.001A CLOSED NONDISPLACED FRACTURE OF FIRST CERVICAL VERTEBRA, UNSPECIFIED FRACTURE MORPHOLOGY, INITIAL ENCOUNTER (HCC): ICD-10-CM

## 2022-04-28 DIAGNOSIS — S12.100A CLOSED ODONTOID FRACTURE, INITIAL ENCOUNTER (HCC): ICD-10-CM

## 2022-04-28 PROCEDURE — 72040 X-RAY EXAM NECK SPINE 2-3 VW: CPT

## 2022-04-29 ENCOUNTER — TELEMEDICINE (OUTPATIENT)
Dept: NEUROLOGY | Age: 77
End: 2022-04-29
Payer: COMMERCIAL

## 2022-04-29 DIAGNOSIS — I65.01 ASYMPTOMATIC STENOSIS OF RIGHT VERTEBRAL ARTERY: Primary | ICD-10-CM

## 2022-04-29 DIAGNOSIS — E66.01 SEVERE OBESITY (BMI 35.0-39.9) WITH COMORBIDITY (HCC): ICD-10-CM

## 2022-04-29 PROCEDURE — G8399 PT W/DXA RESULTS DOCUMENT: HCPCS | Performed by: STUDENT IN AN ORGANIZED HEALTH CARE EDUCATION/TRAINING PROGRAM

## 2022-04-29 PROCEDURE — 4040F PNEUMOC VAC/ADMIN/RCVD: CPT | Performed by: STUDENT IN AN ORGANIZED HEALTH CARE EDUCATION/TRAINING PROGRAM

## 2022-04-29 PROCEDURE — G8428 CUR MEDS NOT DOCUMENT: HCPCS | Performed by: STUDENT IN AN ORGANIZED HEALTH CARE EDUCATION/TRAINING PROGRAM

## 2022-04-29 PROCEDURE — 1123F ACP DISCUSS/DSCN MKR DOCD: CPT | Performed by: STUDENT IN AN ORGANIZED HEALTH CARE EDUCATION/TRAINING PROGRAM

## 2022-04-29 PROCEDURE — 99214 OFFICE O/P EST MOD 30 MIN: CPT | Performed by: STUDENT IN AN ORGANIZED HEALTH CARE EDUCATION/TRAINING PROGRAM

## 2022-04-29 PROCEDURE — 1090F PRES/ABSN URINE INCON ASSESS: CPT | Performed by: STUDENT IN AN ORGANIZED HEALTH CARE EDUCATION/TRAINING PROGRAM

## 2022-04-29 NOTE — PROGRESS NOTES
[address] Surgery Telehealth Followup Visit    Pt Name: Jose Rodriguez  MRN: [id number]  YOB: 1945  Date of evaluation: 4/29/2022  Primary Care Physician: Vikram Contreras MD  Reason for Evaluation: TELEHEALTH EVALUATION -- Audio/Visual (During AJZO-63 public health emergency) regarding Right vertebral artery stenosis vs injury          Jose Rodriguez is a 76 y.o. female with pmh significant for hypertension, hypothyroidism, hyperlipidemia, recent hospital admission for mechanical fall who presents today for first hospital follow up for right vertebral artery intimal irregularity. Briefly Ms. Bill Milner presented to [facility] on 03/22 after mechanical fall. CT angiogram showed right vertebral artery luminal irregularity. It was probably not associated with trauma and was also asymptomatic. At the time patient was started on aspirin 81 mg daily. Patient denied having any new strokelike symptoms including dizziness, double vision or disequilibrium. No Known Allergies  Current Outpatient Medications   Medication Sig Dispense Refill    hydroCHLOROthiazide (HYDRODIURIL) 25 MG tablet Take 1 tablet by mouth every other day 90 tablet 0    levothyroxine (SYNTHROID) 88 MCG tablet Take 1 tablet by mouth daily 90 tablet 0    lisinopril (PRINIVIL;ZESTRIL) 10 MG tablet Take 1 tablet by mouth daily 90 tablet 0    aspirin 81 MG chewable tablet Take 81 mg by mouth daily      Calcium Carbonate (CALCIUM 600 PO) Take by mouth daily      Multiple Vitamins-Minerals (PRESERVISION AREDS 2) CAPS Take by mouth daily      acetaminophen (TYLENOL) 500 MG tablet Take 500 mg by mouth every 6 hours as needed for Pain      Cholecalciferol (VITAMIN D3) 2000 UNITS CAPS Take 1,000 Int'l Units/day by mouth. No current facility-administered medications for this visit.      Past Medical History:   Diagnosis Date    Arthritis     osteo    Edema     Hyperlipidemia     Hypertension     Hypothyroidism     Kidney stones     Osteoarthritis     Thyroid disease     Unspecified transient cerebral ischemia       Past Surgical History:   Procedure Laterality Date    APPENDECTOMY      CATARACT REMOVAL       SECTION      x4    CORNEAL TRANSPLANT      bilat    FINGER SURGERY Left 2018    INDEX FINGER    HAMMER TOE SURGERY      right and left    JOINT REPLACEMENT Left 10/15/14    TKA    KNEE ARTHROSCOPY Left     LITHOTRIPSY      REPLACEMENT SHOULDER TOTAL Right 2019    SHOULDER TOTAL ARTHROPLASTY, OPEN PARTIAL CLAVICULECTOMY performed by Ford Carolina MD at [address] Right 2019    Dr. Farzana Meyers       Family History   Problem Relation Age of Onset    Diabetes Mother     High Blood Pressure Mother     Diabetes Father     Cancer Father         lung    High Blood Pressure Father     Diabetes Sister     Heart Disease Sister      Social History     Tobacco Use    Smoking status: Never Smoker    Smokeless tobacco: Never Used   Substance Use Topics    Alcohol use: No          Subjective:     Review of Systems      Objective:   Physical Exam  General Appearance:  Alert, cooperative, no signs of distress, appears stated age   Head:  Normocephalic, no signs of trauma   Eyes:  Conjunctiva/corneas clear;  eyelids intact   Ears:  Normal external ear and canals   Nose: Nares normal, no drainage    Throat: Lips and tongue normal; teeth normal;  gums normal   Extremities: Extremities normal, no cyanosis, no edema   Skin: Skin color, texture normal, no rashes, no lesions                                     NEUROLOGIC EXAMINATION      Mental status    Alert and oriented x 3; able to follow commands, speech and language intact; no hallucinations or delusions  Fund of information appropriate for level of education    Cranial nerves    II - grossly intact  III, IV, VI - extra-ocular muscles full: no nystagmus, no ptosis   V - normal facial sensation                                                               VII - normal facial symmetry                                                             VIII - intact hearing                                                                             IX, X - symmetrical palate                                                                  XI - symmetrical shoulder shrug                                                       XII - tongue midline without atrophy      Motor function  Normal muscle bulk. Strength at least 5/5 on all 4 extremities, no pronator drift      Sensory function Unable to test      Cerebellar Intact fine motor movement. No involuntary movements or tremors. No ataxia or dysmetria on finger to nose      Reflex function Unable to test      Gait                   not tested       Imaging:  CTA head and neck 03/22/2022:   1. Intimal irregularity in the right vertebral artery.  This could be   atherosclerotic in nature, but grade 1 blunt cerebrovascular injury cannot be   excluded.  No significant stenosis. 2. No significant stenosis or occlusion of the cervical vasculature. Assessment:        Gerardo Salcedo is a 76 y.o. female with pmh significant for hypertension, hypothyroidism, hyperlipidemia, recent hospital admission for mechanical fall, presented for follow up. Patient had a mechanical fall with type II odontoid fracture with minimal associated angulation and Ric burst fracture type III. Patient was found to have right vertebral artery luminal irregularity at the time. Patient was discharged on ASA 81 mg daily for prevention of stroke. No new stroke like symptoms since discharge. No follow up CTA head and neck since discharge. Recommendations:      1. C/w ASA 81 mg   2. F/up CTA head and neck, ordered today   3. F/up with Dr. Fabien Marino in 3 months, imaging to be completed prior to follow up visit.          Established Patient Visit Time: 25 minutes   Discussed use, benefit, and side effects of prescribed medications. Personally reviewed imaging with patients and all questions answered. Pt voiced understanding. Patient agreed with treatment plan. Follow up as directed above. Felix Osorio MD  Electronically signed by Felix Osorio MD, MD on 4/29/2022 at 12:47 PM       This is a telehealth visit that was performed with the originating site at Patient Location: Patient Home and Provider Location of [place], [place]. Patient ID verified by me prior to start of this visit. Verbal consent to participate in video visit was obtained. Pursuant to the emergency declaration under the [address] [place] [address], [facility] waiver authority and the [facility] and [employer] Act, this Virtual Visit was conducted, with patient's consent, to reduce the patient's risk of exposure to COVID-19 and provide continuity of care for an established/new patient. Services were provided through a video synchronous discussion virtually to substitute for in-person clinic visit. I discussed with the patient the nature of our telehealth visits via interactive/real-time audio/video that:  - I would evaluate the patient and recommend diagnostics and treatments based on my assessment  - Our sessions are not being recorded and that personal health information is protected  - Our team would provide follow up care in person if/when the patient needs it. Kellie Thomas, was evaluated through a synchronous (real-time) audio-video encounter. The patient (or guardian if applicable) is aware that this is a billable service, which includes applicable co-pays. This Virtual Visit was conducted with patient's (and/or legal guardian's) consent.  The visit was conducted pursuant to the emergency declaration under the [address] [place] [address], [address] [place] [address] waiver authority and the [facility] and [employer] Act.  Patient identification was verified, and a caregiver was present when appropriate. The patient was located in a state where the provider was licensed to provide care. Total time spent for this encounter: 25 minutes     --Mindi Waldron MD on 4/29/2022 at 12:47 PM    An electronic signature was used to authenticate this note. I reviewed the Fellow's note and agree with the documented findings and plan of care. Any areas of disagreement are noted on the chart. I agree with the chief complaint, past medical history, past surgical history, allergies, medications, social and family history as documented unless otherwise noted below. I have personally seen and evaluated the patient and images. I find the patient's history and physical exam are consistent with the [address] [place] [address] documentation. I agree with the care provided, treatment rendered, disposition and follow-up plan. Late Entry Note for Date of Endovascular Neurosurgery/Stroke Service rendered on 4-.    75 yo woman with fall and right vert dissection as part of evaluation. Doing well on aspirin. Plan on followup in 2-3 months with Dr. Summer Mauro with [facility] prior to visit     35 minutes with greater than 50% of time spent interactive audio/video visit, examining patient, counseling, coordinating care, obtaining history, reviewing images, labs, and other notes personally. Janna Del Cid MD  Office [phone].  Cell [phone]  Stroke, [facility]  [facility]

## 2022-05-03 ENCOUNTER — TELEMEDICINE (OUTPATIENT)
Dept: NEUROSURGERY | Age: 77
End: 2022-05-03
Payer: COMMERCIAL

## 2022-05-03 DIAGNOSIS — S12.091D OTHER CLOSED NONDISPLACED FRACTURE OF FIRST CERVICAL VERTEBRA WITH ROUTINE HEALING, SUBSEQUENT ENCOUNTER: Primary | ICD-10-CM

## 2022-05-03 DIAGNOSIS — S12.111D CLOSED ODONTOID FRACTURE WITH TYPE II MORPHOLOGY, POSTERIOR DISPLACEMENT, AND ROUTINE HEALING, SUBSEQUENT ENCOUNTER: ICD-10-CM
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
## 2022-05-03 PROCEDURE — 99214 OFFICE O/P EST MOD 30 MIN: CPT | Performed by: NURSE PRACTITIONER

## 2022-05-03 PROCEDURE — G8417 CALC BMI ABV UP PARAM F/U: HCPCS | Performed by: NURSE PRACTITIONER

## 2022-05-03 PROCEDURE — 1090F PRES/ABSN URINE INCON ASSESS: CPT | Performed by: NURSE PRACTITIONER

## 2022-05-03 PROCEDURE — 1123F ACP DISCUSS/DSCN MKR DOCD: CPT | Performed by: NURSE PRACTITIONER

## 2022-05-03 PROCEDURE — G8399 PT W/DXA RESULTS DOCUMENT: HCPCS | Performed by: NURSE PRACTITIONER

## 2022-05-03 PROCEDURE — 4040F PNEUMOC VAC/ADMIN/RCVD: CPT | Performed by: NURSE PRACTITIONER

## 2022-05-03 PROCEDURE — 1036F TOBACCO NON-USER: CPT | Performed by: NURSE PRACTITIONER

## 2022-05-03 PROCEDURE — G8427 DOCREV CUR MEDS BY ELIG CLIN: HCPCS | Performed by: NURSE PRACTITIONER

## 2022-05-03 NOTE — PROGRESS NOTES
[address] [facility] Neurosurgery Telehealth Followup Visit    Pt Name: Sydnie Sykes  MRN: [id number]  YOB: 1945  Date of evaluation: 5/3/2022  Primary Care Physician: Almeta Landau, MD  Reason for Evaluation: TELEHEALTH EVALUATION -- Audio/Visual (During YNEHT-09 public health emergency) and C1/C2 fracture    TELEHEALTH EVALUATION -- [facility]/Visual ([facility])    HPI:    Sydnie Sykes (:  1945) has requested an audio/video evaluation for the following concern(s):    Patient presents for virtual visit following recent hospitalization following fall. Was trying to plug in her vacuum, fell forward and hit her head/neck on a chair. Subsequently evaluated by her PCP and sent for a CT scan that showed evidence of C1/C2 fracture. This is been treated none surgically. Has been compliant with cervical collar. Minimal neck pain. Denies any new numbness tingling or weakness to extremities. No difficulty swallowing. Did complete repeat upright x-rays, presents for review. X-ray cervical spine 2022 (images reviewed): FINDINGS:   Similar findings as plain film as compared to 3/22/22; poor visualization   known C1 and C2 fractures with mild unchanged apex ventral angulation   odontoid fracture.  No overall change in alignment.  Mild prevertebral soft   tissue swelling at the C1 level also stable.       Atlantoaxial junction maintained/unchanged.  Extensive multilevel   degenerative changes and mild anterolisthesis C3 on C4 and C4 on C5 also   again seen with severe DDD C5-C7 and extensive multilevel facet arthrosis.      MRI cervical spine 3/22/2022:  FINDINGS:   BONES/ALIGNMENT: Abnormal edema involving the base of the odontoid consistent   with type 2 fracture.  Edema involving the C1-C2 articulation predental space   likely corresponding to the C1-C2 injury.  No distraction of the fracture   fragments identified.  No abnormal signal identified along the transverse   ligament on the axial images provided.  Otherwise no abnormal bone marrow   edema elsewhere.       SPINAL CORD: No cord contusion or hemorrhage identified. Danette Rodrigues is cord   flattening at multiple levels likely due to the canal narrowing due to   multilevel spondylosis.  This is most notably at C5-C6, C6-C7, C7-T1.       SOFT TISSUES: There is prevertebral thickeningextending from the clivus to C4   due to prevertebral edema.  Additionally, there is mild edema within the   posterior soft tissues extending from the occiput to C5.       C2-C3: Moderate to severe disc space disease.  Moderate circumferential disc   bulge.  Severe facet arthropathy and ligamentum flavum hypertrophy   identified. Danette Rodrigues is moderate bilateral foraminal narrowing and moderate   canal narrowing.       C3-C4: Moderate disc space disease.  Diffuse osteophyte complex formation and   uncovertebral spurring.  Mild to moderate bilateral foraminal narrowing.  No   central canal narrowing.  Mild facet arthropathy.       C4-C5: Anterolisthesis 2-3 mm.  Likely related to the advanced facet   arthropathy.  Moderate disc space disease identified with diffuse disc   osteophyte complex formation and uncovertebral spurring.  Moderate to severe   right-sided and moderate left-sided foraminal narrowing.  Moderate canal   narrowing.       C5-C6: Severe disc space disease.  Diffuse osteophyte complex formation and   uncovertebral spurring identified.  There is severe right and moderate to   severe left foraminal narrowing.  Moderate canal narrowing.  Mild flattening   of the cord at this level.       C6-C7: Severe disc space disease.  Disc extrusion extending caudally.  There   is moderate to severe central canal narrowing with cord flattening.  There is   severe right and moderate left foraminal narrowing.       C7-T1: Severe disc space disease.  Anterolisthesis measuring 3 mm identified.    Slight uncovering posterior disc identified at this level.  Moderate canal   narrowing.  Moderate to severe bilateral foraminal narrowing.  Greatest on   the left.  Otherwise moderate to severe facet arthropathy identified. CT cervical spine 3/22/2022 (images reviewed): FINDINGS:   BONES/ALIGNMENT: There are nondisplaced fractures of the C1 ring, involving   the midline anterior arch and bilateral posterior arches (Ric burst   fracture, type 3).  There is also a type 2 odontoid fracture with minimal   associated angulation.  There is mild anterolisthesis at C3-C4, measuring 0.3   cm.  There is mild anterolisthesis at C4-C5, measuring 0.3 cm.  There is   anterolisthesis at C7-T1, measuring 0.4 cm.  This is likely due to severe   facet arthropathy at multiple levels.  No additional fractures or facet   subluxation is observed.       DEGENERATIVE CHANGES: There is severe multilevel disc space narrowing,   endplate spurring, and facet arthropathy.  There is moderate multilevel   neural foraminal narrowing.  Large bony spurs at the C6-C7 level results in   at least moderate spinal canal narrowing.       SOFT TISSUES: There is mild prevertebral soft tissue swelling at the level of   the odontoid process, measuring 0.8 cm. Prior to Visit Medications    Medication Sig Taking?  Authorizing Provider   hydroCHLOROthiazide (HYDRODIURIL) 25 MG tablet Take 1 tablet by mouth every other day  Sara Riggs MD   levothyroxine (SYNTHROID) 88 MCG tablet Take 1 tablet by mouth daily  Sara Riggs MD   lisinopril (PRINIVIL;ZESTRIL) 10 MG tablet Take 1 tablet by mouth daily  Sara Riggs MD   aspirin 81 MG chewable tablet Take 81 mg by mouth daily  Historical Provider, MD   Calcium Carbonate (CALCIUM 600 PO) Take by mouth daily  Historical Provider, MD   Multiple Vitamins-Minerals (PRESERVISION AREDS 2) CAPS Take by mouth daily  Historical Provider, MD   acetaminophen (TYLENOL) 500 MG tablet Take 500 mg by mouth every 6 hours as needed for Pain  Historical Provider, MD   Cholecalciferol (VITAMIN D3) 2000 UNITS CAPS Take 1,000 Int'l Units/day by mouth. Historical Provider, MD       Social History     Tobacco Use    Smoking status: Never Smoker    Smokeless tobacco: Never Used   Substance Use Topics    Alcohol use: No    Drug use: Never        No Known Allergies,   Past Medical History:   Diagnosis Date    Arthritis     osteo    Edema     Hyperlipidemia     Hypertension     Hypothyroidism     Kidney stones     Osteoarthritis     Thyroid disease     Unspecified transient cerebral ischemia    ,   Past Surgical History:   Procedure Laterality Date    APPENDECTOMY      CATARACT REMOVAL       SECTION      x4    CORNEAL TRANSPLANT      bilat    FINGER SURGERY Left 2018    INDEX FINGER    HAMMER TOE SURGERY      right and left    JOINT REPLACEMENT Left 10/15/14    TKA    KNEE ARTHROSCOPY Left     LITHOTRIPSY      REPLACEMENT SHOULDER TOTAL Right 2019    SHOULDER TOTAL ARTHROPLASTY, OPEN PARTIAL CLAVICULECTOMY performed by Cristobal Bernstein MD at [address] [place] [address] Right 2019    Dr. Bety Gaming  Constitutional: Negative for activity change and appetite change. HENT: Negative for ear pain and facial swelling. Eyes: Negative for discharge and itching. Respiratory: Negative for choking and chest tightness. Cardiovascular: Negative for chest pain and leg swelling. Gastrointestinal: Negative for nausea and abdominal pain. Endocrine: Negative for cold intolerance and heat intolerance. Genitourinary: Negative for frequency and flank pain. Musculoskeletal: Negative for myalgias and joint swelling. Skin: Negative for rash and wound. Allergic/Immunologic: Negative for environmental allergies and food allergies. Hematological: Negative for adenopathy. Does not bruise/bleed easily. Psychiatric/Behavioral: Negative for self-injury. The patient is not nervous/anxious.     PHYSICAL EXAMINATION:  [INSTRUCTIONS:  \"[x]\" Indicates a positive item  \"[]\" Indicates a negative item  -- DELETE ALL ITEMS NOT EXAMINED]  Vital Signs: (As obtained by patient/caregiver at home)    Constitutional: [x] Appears well-developed and well-nourished [x] No apparent distress      [] Abnormal   Mental status  [x] Alert and awake  [x] Oriented to person/place/time [x]Able to follow commands        Eyes:  EOM    [x]  Normal  [] Abnormal-  Sclera  [x]  Normal  [] Abnormal -         Discharge [x]  None visible  [] Abnormal -    HENT:   [x] Normocephalic, atraumatic. [] Abnormal   [x] Mouth/Throat: Mucous membranes are moist.     External Ears [x] Normal  [] Abnormal-    Neck: [x] No visualized mass     Pulmonary/Chest: [x] Respiratory effort normal.  [x] No visualized signs of difficulty breathing or respiratory distress        [] Abnormal      Musculoskeletal:   [] Normal gait with no signs of ataxia-not visualized        [] Normal range of motion of neck-cervical collar in place        [] Abnormal       Neurological:        [x] No Facial Asymmetry (Cranial nerve 7 motor function) (limited exam to video visit)          [x] No gaze palsy        [] Abnormal         Skin:        [x] No significant exanthematous lesions or discoloration noted on facial skin         [] Abnormal            Psychiatric:       [x] Normal Affect [] Abnormal        [x] No Hallucinations    Due to this being a TeleHealth encounter, evaluation of the following organ systems is limited: Vitals/Constitutional/EENT/Resp/CV/GI//MS/Neuro/Skin/Heme-Lymph-Imm. ASSESSMENT/PLAN:   Diagnosis Orders   1. Other closed nondisplaced fracture of first cervical vertebra with routine healing, subsequent encounter  CT CERVICAL SPINE WO CONTRAST   2. Closed odontoid fracture with type II morphology, posterior displacement, and routine healing, subsequent encounter  CT CERVICAL SPINE WO CONTRAST   3.  Fall from standing, subsequent encounter         Upright x-rays stable. Continue cervical collar at this time, okay to remove when lying flat and or to eat. Patient with no new sensorimotor deficits present. We will repeat CT scan in approximately 5 weeks to evaluate healing. Patient to follow-up after repeat CT. Return in about 6 weeks (around 6/14/2022), or if symptoms worsen or fail to improve. An  electronic signature was used to authenticate this note. --ЕЛЕНА Osman - CNP on 5/3/2022 at 10:50 AM    Patient given educational materials - see patient instructions. Discussed use, benefit, and side effects of prescribed medications. Personally reviewed imaging with patients and all questions answered. Pt voiced understanding. Patient agreed with treatment plan. Follow up as directed above. Pursuant to the emergency declaration under the [address] [place] [address], [facility] waiver authority and the [employer] and [employer] Act, this Virtual  Visit was conducted, with patient's consent, to reduce the patient's risk of exposure to COVID-19 and provide continuity of care for an established patient. Services were provided through a video synchronous discussion virtually to substitute for in-person clinic visit. This is a telehealth visit that was performed with the originating site at Patient Location of [place] and Provider Location of [place], [place]. Verbal consent to participate in video visit was obtained. Pursuant to the emergency declaration under the [address] [place] [address], 1135 waiver authority and the [employer] and [employer] Act, this Virtual Visit was conducted, with patient's consent, to reduce the patient's risk of exposure to COVID-19 and provide continuity of care for an established patient. Services were provided through a video synchronous discussion virtually to substitute for in-person clinic visit.  I discussed with the patient the nature of our telehealth visits via interactive/real-time audio/video that:  - I would evaluate the patient and recommend diagnostics and treatments based on my assessment  - Our sessions are not being recorded and that personal health information is protected  - Our team would provide follow up care in person if/when the patient needs it.

## 2022-05-06 ENCOUNTER — OFFICE VISIT (OUTPATIENT)
Dept: PRIMARY CARE CLINIC | Age: 77
End: 2022-05-06
Payer: COMMERCIAL

## 2022-05-06 VITALS
WEIGHT: 164 LBS | DIASTOLIC BLOOD PRESSURE: 69 MMHG | OXYGEN SATURATION: 97 % | SYSTOLIC BLOOD PRESSURE: 106 MMHG | HEART RATE: 90 BPM | BODY MASS INDEX: 37.1 KG/M2

## 2022-05-06 DIAGNOSIS — H91.93 BILATERAL HEARING LOSS, UNSPECIFIED HEARING LOSS TYPE: ICD-10-CM

## 2022-05-06 DIAGNOSIS — I10 ESSENTIAL HYPERTENSION: ICD-10-CM

## 2022-05-06 DIAGNOSIS — S12.100G CLOSED ODONTOID FRACTURE WITH DELAYED HEALING, SUBSEQUENT ENCOUNTER: ICD-10-CM

## 2022-05-06 DIAGNOSIS — S12.01XK: Primary | ICD-10-CM

## 2022-05-06 PROCEDURE — G8417 CALC BMI ABV UP PARAM F/U: HCPCS | Performed by: FAMILY MEDICINE

## 2022-05-06 PROCEDURE — 4040F PNEUMOC VAC/ADMIN/RCVD: CPT | Performed by: FAMILY MEDICINE

## 2022-05-06 PROCEDURE — G8399 PT W/DXA RESULTS DOCUMENT: HCPCS | Performed by: FAMILY MEDICINE

## 2022-05-06 PROCEDURE — G8427 DOCREV CUR MEDS BY ELIG CLIN: HCPCS | Performed by: FAMILY MEDICINE

## 2022-05-06 PROCEDURE — 1090F PRES/ABSN URINE INCON ASSESS: CPT | Performed by: FAMILY MEDICINE

## 2022-05-06 PROCEDURE — 99211 OFF/OP EST MAY X REQ PHY/QHP: CPT | Performed by: FAMILY MEDICINE

## 2022-05-06 PROCEDURE — 1123F ACP DISCUSS/DSCN MKR DOCD: CPT | Performed by: FAMILY MEDICINE

## 2022-05-06 PROCEDURE — 99214 OFFICE O/P EST MOD 30 MIN: CPT | Performed by: FAMILY MEDICINE

## 2022-05-06 PROCEDURE — 1036F TOBACCO NON-USER: CPT | Performed by: FAMILY MEDICINE

## 2022-05-06 RX ORDER — LISINOPRIL 10 MG/1
5 TABLET ORAL DAILY
Qty: 45 TABLET | Refills: 0 | Status: SHIPPED
Start: 2022-05-06 | End: 2022-07-22 | Stop reason: SDUPTHER

## 2022-05-06 ASSESSMENT — PATIENT HEALTH QUESTIONNAIRE - PHQ9
SUM OF ALL RESPONSES TO PHQ QUESTIONS 1-9: 0
SUM OF ALL RESPONSES TO PHQ9 QUESTIONS 1 & 2: 0
1. LITTLE INTEREST OR PLEASURE IN DOING THINGS: 0
SUM OF ALL RESPONSES TO PHQ QUESTIONS 1-9: 0
2. FEELING DOWN, DEPRESSED OR HOPELESS: 0

## 2022-05-06 NOTE — PATIENT INSTRUCTIONS
SURVEY:    You may be receiving a survey from [employer] regarding your visit today. You may get this in the mail, through your MyChart, or in your email. Please complete the survey to enable us to provide the highest quality of care to you and your family. If you cannot score us a very good (5 Stars) on any question, please call the office to discuss how we could of made your experience exceptional.    Thank you!     Dr. Maribell Ramirez, FOUZIA Foreman, RN   [facility], [address] [place] [address], [address]    Phone: [phone]  Fax: [phone]    Office Hours:   Mercedes Raya, F: 8-5 Wednesday: 9-11

## 2022-05-06 NOTE — PROGRESS NOTES
Patient is here for a 1 month f/u for Cervical Fracture/hypertension      Cervical fracture: Patient states she is doing ok. Hypertension: Patient here for follow-up of elevated blood pressure. She is exercising and is adherent to low salt diet. Blood pressure is not well controlled at home. Cardiac symptoms none. Patient denies chest pain, fatigue and palpitations. Cardiovascular risk factors: advanced age (older than 54 for men, 72 for women), hypertension and obesity (BMI >= 30 kg/m2). Use of agents associated with hypertension: none. Patient states since having the neck brace on her hearing has gone done hill or says it;s something else. CURRENT ALLERGIES: Patient has no known allergies.     PAST MEDICAL HISTORY:   Past Medical History:   Diagnosis Date    Arthritis     osteo    Edema     Hyperlipidemia     Hypertension     Hypothyroidism     Kidney stones     Osteoarthritis     Thyroid disease     Unspecified transient cerebral ischemia        SURGICAL HISTORY:   Past Surgical History:   Procedure Laterality Date    APPENDECTOMY      CATARACT REMOVAL       SECTION      x4    CORNEAL TRANSPLANT      bilat    FINGER SURGERY Left 2018    INDEX FINGER    HAMMER TOE SURGERY      right and left    JOINT REPLACEMENT Left 10/15/14    TKA    KNEE ARTHROSCOPY Left     LITHOTRIPSY      REPLACEMENT SHOULDER TOTAL Right 2019    SHOULDER TOTAL ARTHROPLASTY, OPEN PARTIAL CLAVICULECTOMY performed by Corine Gleason MD at [place] [address] Right 2019    Dr. Franco Kruger HISTORY:   Family History   Problem Relation Age of Onset    Diabetes Mother     High Blood Pressure Mother     Diabetes Father     Cancer Father         lung    High Blood Pressure Father     Diabetes Sister     Heart Disease Sister        SOCIAL HISTORY:   Social History     Tobacco Use    Smoking status: Never Smoker    Smokeless tobacco: Never Used Substance Use Topics    Alcohol use: No    Drug use: Never     Prior to Admission medications    Medication Sig Start Date End Date Taking? Authorizing Provider   hydroCHLOROthiazide (HYDRODIURIL) 25 MG tablet Take 1 tablet by mouth every other day 3/22/22  Yes Cortney Castellanos MD   levothyroxine (SYNTHROID) 88 MCG tablet Take 1 tablet by mouth daily 3/22/22  Yes Cortney Castellanos MD   aspirin 81 MG chewable tablet Take 81 mg by mouth daily   Yes Historical Provider, MD   Calcium Carbonate (CALCIUM 600 PO) Take by mouth daily   Yes Historical Provider, MD   Multiple Vitamins-Minerals (PRESERVISION AREDS 2) CAPS Take by mouth daily   Yes Historical Provider, MD   acetaminophen (TYLENOL) 500 MG tablet Take 500 mg by mouth every 6 hours as needed for Pain   Yes Historical Provider, MD   Cholecalciferol (VITAMIN D3) 2000 UNITS CAPS Take 1,000 Int'l Units/day by mouth. Yes Historical Provider, MD   lisinopril (PRINIVIL;ZESTRIL) 10 MG tablet Take 0.5 tablets by mouth daily 5/6/22  Yes Cortney Castellanos MD       Review of Systems:  Constitutional: negative for fevers or chills  Eyes: negative for visual disturbance   ENT: negative for sore throat or nasal congestion,has bilat hearing loss  Respiratory: negative for shortness of breath or cough  Cardiovascular: negative for chest pain ,palpitations,pnd,syncope  Gastrointestinal: negative for abd pain, nausea, vomiting, diarrhea , constipation,hemetemesis,kate,blood in stool  Genitourinary: negative for dysuria, urgency ,frequency,hematuria  Integument/breast: negative for skin rash or lesions  Neurological: negative for unilateral weakness, numbness or tingling. Skeletal Muscular: no joint pain,jont swelling,neck  Pain well controlled with tylenol    Subjective:  Vitals:    05/06/22 0812   BP: 106/69   Pulse: 90   SpO2: 97%         Exam:  GEN:   A & O x3, no apparent distress  EYES: No gross abnormalities.   ENT:right and left TM normal without fluid or infection, neck without nodes and throat normal without erythema or exudate,has diminished hearing both ears  NECK: normal, supple, no lymphadenopathy,  no carotid bruits  PULM: clear to auscultation bilaterally- no wheezes, rales or rhonchi, normal air movement, no respiratory distress  COR: regular rate & rhythm, no gallops and has 2/6 murmer  ABD:  soft, non-tender  : deferred  EXT: Extremities: + 2 pedal pulses, has bilat non pitting  edema ,no calf tenderness, and warm to touch. Normal nails without lesions  Skeletomuscular: has neck brace,has multiple deformed joints  NEURO: Motor and sensory grossly intact  SKIN:  No skin lesions or rashes      Assessment:  1. Ric fracture with nonunion, subsequent encounter    2. Closed odontoid fracture with delayed healing, subsequent encounter    3. Essential hypertension    4. Bilateral hearing loss, unspecified hearing loss type        Plan:    ICD-10-CM    1. Ric fracture with nonunion, subsequent encounter - continue neck brace, tylenol prn for pain S12. 01XK    2. Closed odontoid fracture with delayed healing, subsequent encounter  S12.100G    3. Essential hypertension- bp lower,decrease lisinopril to 5 mg,continue hctz 25 mg every other day  I10    4.  Bilateral hearing loss, unspecified hearing loss type - advised hearing evaluation H91.93        Medication directions and side effects discussed      Electronically signed by Glory Lezama MD on 5/6/2022 at 8:38 AM         Glory Lezama MD, MD

## 2022-05-09 ENCOUNTER — TELEPHONE (OUTPATIENT)
Dept: NEUROSURGERY | Age: 77
End: 2022-05-09

## 2022-05-09 NOTE — TELEPHONE ENCOUNTER
Patient called and would like to know if she is able to take her brace off while she is getting her teeth cleaned at a dental appointment. Patient would also like to know if she would be able to sit in a car for a few days for a road trip with out injury. Please advise.

## 2022-05-10 NOTE — TELEPHONE ENCOUNTER
Okay to remove for teeth cleaning as long as she is lying back and does not extend or flex her neck. It is okay to ride in a car.

## 2022-06-10 ENCOUNTER — HOSPITAL ENCOUNTER (OUTPATIENT)
Dept: CT IMAGING | Age: 77
Discharge: HOME | End: 2022-06-12
Payer: COMMERCIAL

## 2022-06-10 ENCOUNTER — OFFICE VISIT (OUTPATIENT)
Dept: PRIMARY CARE CLINIC | Age: 77
End: 2022-06-10
Payer: COMMERCIAL

## 2022-06-10 VITALS
DIASTOLIC BLOOD PRESSURE: 84 MMHG | BODY MASS INDEX: 37.05 KG/M2 | HEART RATE: 87 BPM | RESPIRATION RATE: 16 BRPM | WEIGHT: 163.8 LBS | SYSTOLIC BLOOD PRESSURE: 130 MMHG

## 2022-06-10 DIAGNOSIS — S12.01XK: Primary | ICD-10-CM

## 2022-06-10 DIAGNOSIS — S12.100G CLOSED ODONTOID FRACTURE WITH DELAYED HEALING, SUBSEQUENT ENCOUNTER: ICD-10-CM

## 2022-06-10 DIAGNOSIS — S12.091D OTHER CLOSED NONDISPLACED FRACTURE OF FIRST CERVICAL VERTEBRA WITH ROUTINE HEALING, SUBSEQUENT ENCOUNTER: ICD-10-CM

## 2022-06-10 DIAGNOSIS — I10 ESSENTIAL HYPERTENSION: ICD-10-CM

## 2022-06-10 DIAGNOSIS — E03.9 HYPOTHYROIDISM, UNSPECIFIED TYPE: ICD-10-CM

## 2022-06-10 DIAGNOSIS — S12.111D CLOSED ODONTOID FRACTURE WITH TYPE II MORPHOLOGY, POSTERIOR DISPLACEMENT, AND ROUTINE HEALING, SUBSEQUENT ENCOUNTER: ICD-10-CM

## 2022-06-10 PROCEDURE — 72125 CT NECK SPINE W/O DYE: CPT

## 2022-06-10 PROCEDURE — G8417 CALC BMI ABV UP PARAM F/U: HCPCS | Performed by: FAMILY MEDICINE

## 2022-06-10 PROCEDURE — 99214 OFFICE O/P EST MOD 30 MIN: CPT | Performed by: FAMILY MEDICINE

## 2022-06-10 PROCEDURE — 1090F PRES/ABSN URINE INCON ASSESS: CPT | Performed by: FAMILY MEDICINE

## 2022-06-10 PROCEDURE — 1036F TOBACCO NON-USER: CPT | Performed by: FAMILY MEDICINE

## 2022-06-10 PROCEDURE — 1123F ACP DISCUSS/DSCN MKR DOCD: CPT | Performed by: FAMILY MEDICINE

## 2022-06-10 PROCEDURE — G8427 DOCREV CUR MEDS BY ELIG CLIN: HCPCS | Performed by: FAMILY MEDICINE

## 2022-06-10 PROCEDURE — G8399 PT W/DXA RESULTS DOCUMENT: HCPCS | Performed by: FAMILY MEDICINE

## 2022-06-10 NOTE — PROGRESS NOTES
Patient is here for a one month follow up. Hypertension: Patient here for follow-up of elevated blood pressure. She is exercising a little and is adherent to low salt diet. Blood pressure is not well controlled at home. Patient does not check her blood pressure at home. Cardiac symptoms none. Patient denies chest pain, fatigue and palpitations. Cardiovascular risk factors: advanced age (older than 54 for men, 72 for women), hypertension and obesity (BMI >= 30 kg/m2). Use of agents associated with hypertension: none. Cervical Fracture: Patient states the only time she is in pain is when she turns the wrong way, which hardly occurs. Has no weakness. CURRENT ALLERGIES: Patient has no known allergies.     PAST MEDICAL HISTORY:   Past Medical History:   Diagnosis Date    Arthritis     osteo    Edema     Hyperlipidemia     Hypertension     Hypothyroidism     Kidney stones     Osteoarthritis     Thyroid disease     Unspecified transient cerebral ischemia        SURGICAL HISTORY:   Past Surgical History:   Procedure Laterality Date    APPENDECTOMY      CATARACT REMOVAL       SECTION      x4    CORNEAL TRANSPLANT      bilat    FINGER SURGERY Left 2018    INDEX FINGER    HAMMER TOE SURGERY      right and left    JOINT REPLACEMENT Left 10/15/14    TKA    KNEE ARTHROSCOPY Left     LITHOTRIPSY      REPLACEMENT SHOULDER TOTAL Right 2019    SHOULDER TOTAL ARTHROPLASTY, OPEN PARTIAL CLAVICULECTOMY performed by Caitlyn Hahn MD at [facility] Right 2019    Dr. Purcell [facility] HISTORY:   Family History   Problem Relation Age of Onset    Diabetes Mother     High Blood Pressure Mother     Diabetes Father     Cancer Father         lung    High Blood Pressure Father     Diabetes Sister     Heart Disease Sister        SOCIAL HISTORY:   Social History     Tobacco Use    Smoking status: Never Smoker    Smokeless tobacco: Never Used   Substance Use Topics    Alcohol use: No    Drug use: Never     Prior to Admission medications    Medication Sig Start Date End Date Taking? Authorizing Provider   lisinopril (PRINIVIL;ZESTRIL) 10 MG tablet Take 0.5 tablets by mouth daily 5/6/22   Parker Ag MD   hydroCHLOROthiazide (HYDRODIURIL) 25 MG tablet Take 1 tablet by mouth every other day 3/22/22   Parker Ag MD   levothyroxine (SYNTHROID) 88 MCG tablet Take 1 tablet by mouth daily 3/22/22   Parker Ag MD   aspirin 81 MG chewable tablet Take 81 mg by mouth daily    Historical Provider, MD   Calcium Carbonate (CALCIUM 600 PO) Take by mouth daily    Historical Provider, MD   Multiple Vitamins-Minerals (PRESERVISION AREDS 2) CAPS Take by mouth daily    Historical Provider, MD   acetaminophen (TYLENOL) 500 MG tablet Take 500 mg by mouth every 6 hours as needed for Pain    Historical Provider, MD   Cholecalciferol (VITAMIN D3) 2000 UNITS CAPS Take 1,000 Int'l Units/day by mouth. Historical Provider, MD       Review of Systems:  Constitutional: negative for fevers or chills  Eyes: negative for visual disturbance   ENT: negative for sore throat or nasal congestion  Respiratory: negative for shortness of breath or cough  Cardiovascular: negative for chest pain ,palpitations,pnd,syncope  Gastrointestinal: negative for abd pain, nausea, vomiting, diarrhea , constipation,hemetemesis,kate,blood in stool  Genitourinary: negative for dysuria, urgency ,frequency,hematuria  Integument/breast: negative for skin rash or lesions  Neurological: negative for unilateral weakness, numbness or tingling. Skeletal Muscular: has no neck pain unless she moves side ways,no joint pain,jont swelling,back pain    Subjective:  Vitals:    06/10/22 0756   BP: 130/84   Pulse: 87   Resp: 16         Exam:  GEN:   A & O x3, no apparent distress  EYES: No gross abnormalities.   ENT:neck without nodes and throat normal without erythema or exudate  NECK: Has neck brace. ,   PULM: clear to auscultation bilaterally- no wheezes, rales or rhonchi, normal air movement, no respiratory distress  COR: regular rate & rhythm, no murmurs and no gallops  ABD:  soft, non-tender, non-distended, normal bowel sounds, no masses or organomegaly  : deferred  EXT: Extremities: + 2 pedal pulses, no edema or calf tenderness, and warm to touch. Normal nails without lesions  Skeletomuscular:has multiple deformed joints  NEURO: Motor and sensory grossly intact  SKIN:  No skin lesions or rashes      Assessment:  1. Ric fracture with nonunion, subsequent encounter    2. Essential hypertension    3. Hypothyroidism, unspecified type    4. Closed odontoid fracture with delayed healing, subsequent encounter          Plan:    ICD-10-CM    1. Ric fracture with nonunion, subsequent encounter -pain improved,continue brace S12. 01XK    2. Essential hypertension -well controlled with lisinopril,hctz I10    3. Hypothyroidism, unspecified type -well controlled with synthroid E03.9    4.  Closed odontoid fracture with delayed healing, subsequent encounter -continue neck brace,walker to prevent falls S12.100G        Medication directions and side effects discussed      Electronically signed by Mackenzie Raymond MD on 6/10/2022 at 8:07 Simon Mcmullen MD, MD

## 2022-06-10 NOTE — PATIENT INSTRUCTIONS
SURVEY:    You may be receiving a survey from [facility] [facility] regarding your visit today. You may get this in the mail, through your MyChart, or in your email. Please complete the survey to enable us to provide the highest quality of care to you and your family. If you cannot score us a very good (5 Stars) on any question, please call the office to discuss how we could of made your experience exceptional.    Thank you!     Dr. Sylvester Sexton, FOUZIA [place], KIERAN Juárez, [address] [place] [address], [address]    Phone: [phone]  Fax: [phone]    Office Hours:   Mercedes Richards, F: 8-5

## 2022-06-14 ENCOUNTER — OFFICE VISIT (OUTPATIENT)
Dept: NEUROSURGERY | Age: 77
End: 2022-06-14
Payer: COMMERCIAL

## 2022-06-14 ENCOUNTER — HOSPITAL ENCOUNTER (OUTPATIENT)
Dept: GENERAL RADIOLOGY | Age: 77
Discharge: HOME | End: 2022-06-16
Payer: COMMERCIAL

## 2022-06-14 ENCOUNTER — HOSPITAL ENCOUNTER (OUTPATIENT)
Age: 77
Discharge: HOME | End: 2022-06-16
Payer: COMMERCIAL

## 2022-06-14 VITALS
HEIGHT: 55 IN | WEIGHT: 161 LBS | OXYGEN SATURATION: 95 % | SYSTOLIC BLOOD PRESSURE: 126 MMHG | TEMPERATURE: 98.1 F | DIASTOLIC BLOOD PRESSURE: 78 MMHG | BODY MASS INDEX: 37.26 KG/M2 | HEART RATE: 85 BPM
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
## 2022-06-14 DIAGNOSIS — S12.111D CLOSED ODONTOID FRACTURE WITH TYPE II MORPHOLOGY, POSTERIOR DISPLACEMENT, AND ROUTINE HEALING, SUBSEQUENT ENCOUNTER: ICD-10-CM

## 2022-06-14 DIAGNOSIS — S12.091D OTHER CLOSED NONDISPLACED FRACTURE OF FIRST CERVICAL VERTEBRA WITH ROUTINE HEALING, SUBSEQUENT ENCOUNTER: Primary | ICD-10-CM

## 2022-06-14 DIAGNOSIS — S12.091D OTHER CLOSED NONDISPLACED FRACTURE OF FIRST CERVICAL VERTEBRA WITH ROUTINE HEALING, SUBSEQUENT ENCOUNTER: ICD-10-CM

## 2022-06-14 PROCEDURE — 99214 OFFICE O/P EST MOD 30 MIN: CPT | Performed by: NURSE PRACTITIONER

## 2022-06-14 PROCEDURE — G8417 CALC BMI ABV UP PARAM F/U: HCPCS | Performed by: NURSE PRACTITIONER

## 2022-06-14 PROCEDURE — 1090F PRES/ABSN URINE INCON ASSESS: CPT | Performed by: NURSE PRACTITIONER

## 2022-06-14 PROCEDURE — G8427 DOCREV CUR MEDS BY ELIG CLIN: HCPCS | Performed by: NURSE PRACTITIONER

## 2022-06-14 PROCEDURE — 1123F ACP DISCUSS/DSCN MKR DOCD: CPT | Performed by: NURSE PRACTITIONER

## 2022-06-14 PROCEDURE — 1036F TOBACCO NON-USER: CPT | Performed by: NURSE PRACTITIONER

## 2022-06-14 PROCEDURE — G8399 PT W/DXA RESULTS DOCUMENT: HCPCS | Performed by: NURSE PRACTITIONER

## 2022-06-14 PROCEDURE — 72040 X-RAY EXAM NECK SPINE 2-3 VW: CPT

## 2022-06-14 NOTE — PROGRESS NOTES
[address]  [facility] [address] [facility] [address]  Dept: [phone]    Patient:  Gonzalez Denis  YOB: 1945  Date: 22    The patient is a 76 y.o. female who presents today for consult of the following problems:     Chief Complaint   Patient presents with    Follow-up     review CT         HPI:     Gonzalez Denis is a 76 y.o. female presents for follow-up of mechanical fall in late March, fell forward striking chin resulting in C1/C2 fractures. This was treated nonsurgically with Aspen cervical collar. Has been compliant with cervical collar. Presents today for review of repeat CT scan to evaluate healing. Minimal occasional posterior axial discomfort. Denies any new numbness tingling or weakness to extremities. Utilizes walker for ambulation at baseline, this remains unchanged. Denies difficulty swallowing. Only complaint is discomfort from cervical collar.        History:     Past Medical History:   Diagnosis Date    Arthritis     osteo    Edema     Hyperlipidemia     Hypertension     Hypothyroidism     Kidney stones     Osteoarthritis     Thyroid disease     Unspecified transient cerebral ischemia      Past Surgical History:   Procedure Laterality Date    APPENDECTOMY      CATARACT REMOVAL       SECTION      x4    CORNEAL TRANSPLANT      bilat    FINGER SURGERY Left 2018    INDEX FINGER    HAMMER TOE SURGERY      right and left    JOINT REPLACEMENT Left 10/15/14    TKA    KNEE ARTHROSCOPY Left     LITHOTRIPSY      REPLACEMENT SHOULDER TOTAL Right 2019    SHOULDER TOTAL ARTHROPLASTY, OPEN PARTIAL CLAVICULECTOMY performed by Cristobal Bernstein MD at [address] Right 2019    Dr. Quiana Rose       Family History   Problem Relation Age of Onset    Diabetes Mother     High Blood Pressure Mother    [facility] Diabetes Father     Cancer Father         lung    High Blood Pressure Father     Diabetes Sister     Heart Disease Sister      Current Outpatient Medications on File Prior to Visit   Medication Sig Dispense Refill    lisinopril (PRINIVIL;ZESTRIL) 10 MG tablet Take 0.5 tablets by mouth daily 45 tablet 0    hydroCHLOROthiazide (HYDRODIURIL) 25 MG tablet Take 1 tablet by mouth every other day 90 tablet 0    levothyroxine (SYNTHROID) 88 MCG tablet Take 1 tablet by mouth daily 90 tablet 0    aspirin 81 MG chewable tablet Take 81 mg by mouth daily      Calcium Carbonate (CALCIUM 600 PO) Take by mouth daily      Multiple Vitamins-Minerals (PRESERVISION AREDS 2) CAPS Take by mouth daily      acetaminophen (TYLENOL) 500 MG tablet Take 500 mg by mouth every 6 hours as needed for Pain      Cholecalciferol (VITAMIN D3) 2000 UNITS CAPS Take 1,000 Int'l Units/day by mouth. No current facility-administered medications on file prior to visit. Social History     Tobacco Use    Smoking status: Never Smoker    Smokeless tobacco: Never Used   Substance Use Topics    Alcohol use: No    Drug use: Never       No Known Allergies    Review of Systems  Constitutional: Negative for activity change and appetite change. HENT: Negative for ear pain and facial swelling. Eyes: Negative for discharge and itching. Respiratory: Negative for choking and chest tightness. Cardiovascular: Negative for chest pain and leg swelling. Gastrointestinal: Negative for nausea and abdominal pain. Endocrine: Negative for cold intolerance and heat intolerance. Genitourinary: Negative for frequency and flank pain. Musculoskeletal: Negative for myalgias and joint swelling. Skin: Negative for rash and wound. Allergic/Immunologic: Negative for environmental allergies and food allergies. Hematological: Negative for adenopathy. Does not bruise/bleed easily. Psychiatric/Behavioral: Negative for self-injury.  The patient is not nervous/anxious. Physical Exam:      /78   Pulse 85   Temp 98.1 °F (36.7 °C)   Ht 4' 7\" (1.397 m)   Wt 161 lb (73 kg)   SpO2 95%   BMI 37.42 kg/m²   Estimated body mass index is 37.42 kg/m² as calculated from the following:    Height as of this encounter: 4' 7\" (1.397 m). Weight as of this encounter: 161 lb (73 kg). General:  Laura Lopez is a 76y.o. year old female who appears her stated age. HEENT: Normocephalic atraumatic. Neck supple. Chest: regular rate; pulses equal  Abdomen: Soft nontender nondistended. Ext: DP and PT pulses 2+, good cap refill  Neuro    Mentation  Appropriate affect  Registration intact  Orientation intact  Judgement intact to situation    Cranial Nerves:   Pupils equal and reactive to light  Extraocular motion intact  Face and shrug symmetric  Tongue midline  No dysarthria  v1-3 sensation symmetric, masseter tone symmetric  Hearing symmetric    Sensation: Intact    Motor  L deltoid 5/5; R deltoid 5/5  L biceps 5/5; R biceps 5/5  L triceps 5/5; R triceps 5/5  Bilateral hands/wrists 4/5, baseline finger contractures, L>R    L iliopsoas 5/5 , R iliopsoas 5/5  L quadriceps 5/5; R quadriceps 5/5  L Dorsiflexion 5/5; R dorsiflexion 5/5  L Plantarflexion 5/5; R plantarflexion 5/5  L EHL 5/5; R EHL 5/5    Reflexes  L Brachioradialis 2+/4; R brachioradialis 2+/4  L Biceps 2+/4; R Biceps 2+/4  L Triceps 2+/4; R Triceps 2+/4  L Patellar 2+/4: R Patellar 2+/4  L Achilles 2+/4; R Achilles 2+/4    hoffmans L: neg  hoffmans R: neg  Clonus L: neg  Clonus R: neg  Babinski L: neg  Babinski R: neg    Studies Review:     CT cervical 6/10/2022:  FINDINGS:   BONES/ALIGNMENT: Subacute fracture is present through the base of the   odontoid process.  There is dorsal tilting of the dens with no significant   change in fracture alignment or distraction between the odontoid process and   the base of the dens.  No bony union of the fracture has occurred.    Atlantoaxial interval is maintained.       Subacute fractures involving the anterior arch of C1 and bilateral C1   posterior arch without bony union from prior exam.  There is slight bony   resorption along the fracture margins of the lamina fractures.  No new   fracture has developed.       Atlanto-occipital alignment is maintained.       No additional fracture in the cervical spine.  Cervical alignment is   otherwise unchanged.  There is 2 mm anterolisthesis of C3 and 3 mm   anterolisthesis of C4.       DEGENERATIVE CHANGES: Severe C5-6, C6-7 and C7-T1 degenerative changes.  3 mm   anterolisthesis of C7 on T1.  Multilevel facet degenerative changes.       SOFT TISSUES: No acute abnormality in the paraspinal soft tissues. Prevertebral soft tissue swelling has resolved.  Bilateral fluid   opacification of the mastoid air cells. XR cervical 4/28/2022:  FINDINGS:   Similar findings as plain film as compared to 3/22/22; poor visualization   known C1 and C2 fractures with mild unchanged apex ventral angulation   odontoid fracture.  No overall change in alignment.  Mild prevertebral soft   tissue swelling at the C1 level also stable.       Atlantoaxial junction maintained/unchanged.  Extensive multilevel   degenerative changes and mild anterolisthesis C3 on C4 and C4 on C5 also   again seen with severe DDD C5-C7 and extensive multilevel facet arthrosis. Assessment and Plan:      1. Other closed nondisplaced fracture of first cervical vertebra with routine healing, subsequent encounter    2. Closed odontoid fracture with type II morphology, posterior displacement, and routine healing, subsequent encounter    3. Fall from standing, subsequent encounter          Plan: Patient is approximately 12 weeks status post mechanical fall resulting in C1/C2 fractures. Denies any new sensorimotor deficits. Minimal to no axial discomfort.   Will obtain flexion-extension x-rays to evaluate for any instability, continue collar at this time until x-rays obtained/reviewed. Followup: Return in about 8 weeks (around 8/9/2022), or if symptoms worsen or fail to improve. Prescriptions Ordered:  No orders of the defined types were placed in this encounter. Orders Placed:  Orders Placed This Encounter   Procedures    XR CERVICAL SPINE FLEXION AND EXTENSION     Standing Status:   Future     Number of Occurrences:   1     Standing Expiration Date:   9/12/2022     Order Specific Question:   Reason for exam:     Answer:   eval for instability C2 fracture        Electronically signed by ЕЛЕНА Santos CNP on 6/14/2022 at 1:03 PM    Please note that this chart was generated using voice recognition Dragon dictation software. Although every effort was made to ensure the accuracy of this automated transcription, some errors in transcription may have occurred.

## 2022-07-06 DIAGNOSIS — E03.9 HYPOTHYROIDISM, UNSPECIFIED TYPE: ICD-10-CM

## 2022-07-06 DIAGNOSIS — I65.01 ASYMPTOMATIC STENOSIS OF RIGHT VERTEBRAL ARTERY: Primary | ICD-10-CM

## 2022-07-06 RX ORDER — LEVOTHYROXINE SODIUM 88 UG/1
88 TABLET ORAL DAILY
Qty: 90 TABLET | Refills: 0 | Status: SHIPPED | OUTPATIENT
Start: 2022-07-06 | End: 2022-09-09 | Stop reason: SDUPTHER

## 2022-07-07 ENCOUNTER — HOSPITAL ENCOUNTER (OUTPATIENT)
Age: 77
Discharge: HOME | End: 2022-07-07
Payer: COMMERCIAL

## 2022-07-07 DIAGNOSIS — I65.01 ASYMPTOMATIC STENOSIS OF RIGHT VERTEBRAL ARTERY: ICD-10-CM

## 2022-07-07 PROBLEM — H90.5 SNHL (SENSORINEURAL HEARING LOSS): Status: ACTIVE | Noted: 2022-07-07

## 2022-07-07 LAB
BUN BLDV-MCNC: 15 MG/DL (ref 8–23)
CREAT SERPL-MCNC: 0.65 MG/DL (ref 0.5–0.9)
GFR AFRICAN AMERICAN: >60 ML/MIN
GFR NON-AFRICAN AMERICAN: >60 ML/MIN
GFR SERPL CREATININE-BSD FRML MDRD: NORMAL ML/MIN/{1.73_M2}
GFR SERPL CREATININE-BSD FRML MDRD: NORMAL ML/MIN/{1.73_M2}

## 2022-07-07 PROCEDURE — 36415 COLL VENOUS BLD VENIPUNCTURE: CPT

## 2022-07-07 PROCEDURE — 84520 ASSAY OF UREA NITROGEN: CPT

## 2022-07-07 PROCEDURE — 82565 ASSAY OF CREATININE: CPT

## 2022-07-18 ENCOUNTER — HOSPITAL ENCOUNTER (OUTPATIENT)
Dept: CT IMAGING | Age: 77
Discharge: HOME | End: 2022-07-20
Payer: COMMERCIAL

## 2022-07-18 DIAGNOSIS — I65.01 ASYMPTOMATIC STENOSIS OF RIGHT VERTEBRAL ARTERY: ICD-10-CM

## 2022-07-18 PROCEDURE — 6360000004 HC RX CONTRAST MEDICATION: Performed by: STUDENT IN AN ORGANIZED HEALTH CARE EDUCATION/TRAINING PROGRAM

## 2022-07-18 PROCEDURE — 70498 CT ANGIOGRAPHY NECK: CPT

## 2022-07-18 RX ADMIN — IOPAMIDOL 75 ML: 755 INJECTION, SOLUTION INTRAVENOUS at 07:20

## 2022-07-22 DIAGNOSIS — I10 ESSENTIAL HYPERTENSION: Primary | ICD-10-CM

## 2022-07-22 RX ORDER — LISINOPRIL 10 MG/1
5 TABLET ORAL DAILY
Qty: 45 TABLET | Refills: 0 | Status: SHIPPED | OUTPATIENT
Start: 2022-07-22 | End: 2022-09-09 | Stop reason: SDUPTHER

## 2022-08-10 ENCOUNTER — OFFICE VISIT (OUTPATIENT)
Dept: NEUROSURGERY | Age: 77
End: 2022-08-10
Payer: COMMERCIAL

## 2022-08-10 VITALS
SYSTOLIC BLOOD PRESSURE: 115 MMHG | HEART RATE: 87 BPM | HEIGHT: 55 IN | OXYGEN SATURATION: 97 % | DIASTOLIC BLOOD PRESSURE: 78 MMHG | WEIGHT: 161 LBS | BODY MASS INDEX: 37.26 KG/M2

## 2022-08-10 DIAGNOSIS — S12.091D OTHER CLOSED NONDISPLACED FRACTURE OF FIRST CERVICAL VERTEBRA WITH ROUTINE HEALING, SUBSEQUENT ENCOUNTER: Primary | ICD-10-CM

## 2022-08-10 DIAGNOSIS — S12.111D CLOSED ODONTOID FRACTURE WITH TYPE II MORPHOLOGY, POSTERIOR DISPLACEMENT, AND ROUTINE HEALING, SUBSEQUENT ENCOUNTER: ICD-10-CM

## 2022-08-10 PROCEDURE — 1123F ACP DISCUSS/DSCN MKR DOCD: CPT | Performed by: NURSE PRACTITIONER

## 2022-08-10 PROCEDURE — 1090F PRES/ABSN URINE INCON ASSESS: CPT | Performed by: NURSE PRACTITIONER

## 2022-08-10 PROCEDURE — 99213 OFFICE O/P EST LOW 20 MIN: CPT | Performed by: NURSE PRACTITIONER

## 2022-08-10 PROCEDURE — 1036F TOBACCO NON-USER: CPT | Performed by: NURSE PRACTITIONER

## 2022-08-10 PROCEDURE — G8417 CALC BMI ABV UP PARAM F/U: HCPCS | Performed by: NURSE PRACTITIONER

## 2022-08-10 PROCEDURE — G8427 DOCREV CUR MEDS BY ELIG CLIN: HCPCS | Performed by: NURSE PRACTITIONER

## 2022-08-10 PROCEDURE — G8399 PT W/DXA RESULTS DOCUMENT: HCPCS | Performed by: NURSE PRACTITIONER

## 2022-08-10 RX ORDER — FLUTICASONE PROPIONATE 50 MCG
1 SPRAY, SUSPENSION (ML) NASAL DAILY
COMMUNITY

## 2022-08-10 RX ORDER — B-COMPLEX WITH VITAMIN C
1 TABLET ORAL 2 TIMES DAILY WITH MEALS
COMMUNITY
End: 2022-09-09

## 2022-08-10 RX ORDER — FEXOFENADINE HCL 180 MG/1
180 TABLET ORAL DAILY
COMMUNITY

## 2022-08-10 NOTE — PROGRESS NOTES
[address] [facility]  MOB [address] [place] [address], [address] [facility] [address]  Dept: [phone]    Patient:  Monique Snow  YOB: 1945  Date: 22    The patient is a 76 y.o. female who presents today for consult of the following problems:     Chief Complaint   Patient presents with    Neurologic Problem     nondisplaced fracture of first cervical vertebra with routine healing         HPI:     Monique Snow is a 76 y.o. female presents for follow-up of mechanical fall in late March, fell forward striking chin resulting in C1/C2 fractures. This was treated nonsurgically with Aspen cervical collar. Has been compliant with cervical collar. Denies any new numbness tingling or weakness to extremities. Utilizes walker for ambulation at baseline, this remains unchanged. Denies difficulty swallowing. Did complete flexion-extension x-rays, presents today for review.     History:     Past Medical History:   Diagnosis Date    Arthritis     osteo    Edema     Hyperlipidemia     Hypertension     Hypothyroidism     Kidney stones     Osteoarthritis     Thyroid disease     Unspecified transient cerebral ischemia      Past Surgical History:   Procedure Laterality Date    APPENDECTOMY      CATARACT REMOVAL       SECTION      x4    CORNEAL TRANSPLANT      bilat    FINGER SURGERY Left 2018    INDEX FINGER    HAMMER TOE SURGERY      right and left    JOINT REPLACEMENT Left 10/15/14    TKA    KNEE ARTHROSCOPY Left     LITHOTRIPSY      REPLACEMENT SHOULDER TOTAL Right 2019    SHOULDER TOTAL ARTHROPLASTY, OPEN PARTIAL CLAVICULECTOMY performed by Abbie Méndez MD at [address] Right 2019    Dr. Amilcar Pozo       Family History   Problem Relation Age of Onset    Diabetes Mother     High Blood Pressure Mother     Diabetes Father     Cancer Father         lung    High Blood Pressure Father     Diabetes Sister     Heart Disease Sister      Current Outpatient Medications on File Prior to Visit   Medication Sig Dispense Refill    fluticasone (FLONASE) 50 MCG/ACT nasal spray 1 spray by Nasal route daily      fexofenadine (ALLEGRA) 180 MG tablet Take 180 mg by mouth in the morning. Calcium Carbonate-Vitamin D (OYSTER SHELL CALCIUM/D) 500-200 MG-UNIT TABS Take 1 tablet by mouth in the morning and 1 tablet in the evening. Take with meals. lisinopril (PRINIVIL;ZESTRIL) 10 MG tablet Take 0.5 tablets by mouth in the morning. 45 tablet 0    levothyroxine (SYNTHROID) 88 MCG tablet Take 1 tablet by mouth daily 90 tablet 0    hydroCHLOROthiazide (HYDRODIURIL) 25 MG tablet Take 1 tablet by mouth every other day 90 tablet 0    aspirin 81 MG chewable tablet Take 81 mg by mouth daily      Calcium Carbonate (CALCIUM 600 PO) Take by mouth daily      Multiple Vitamins-Minerals (PRESERVISION AREDS 2) CAPS Take by mouth daily      Cholecalciferol (VITAMIN D3) 2000 UNITS CAPS Take 1,000 Int'l Units/day by mouth. acetaminophen (TYLENOL) 500 MG tablet Take 500 mg by mouth every 6 hours as needed for Pain (Patient not taking: Reported on 8/10/2022)       No current facility-administered medications on file prior to visit. Social History     Tobacco Use    Smoking status: Never    Smokeless tobacco: Never   Vaping Use    Vaping Use: Never used   Substance Use Topics    Alcohol use: No    Drug use: Never       No Known Allergies    Review of Systems  Constitutional: Negative for activity change and appetite change. HENT: Negative for ear pain and facial swelling. Eyes: Negative for discharge and itching. Respiratory: Negative for choking and chest tightness. Cardiovascular: Negative for chest pain and leg swelling. Gastrointestinal: Negative for nausea and abdominal pain. Endocrine: Negative for cold intolerance and heat intolerance.    Genitourinary: Negative for frequency and flank pain. Musculoskeletal: Negative for myalgias and joint swelling. Skin: Negative for rash and wound. Allergic/Immunologic: Negative for environmental allergies and food allergies. Hematological: Negative for adenopathy. Does not bruise/bleed easily. Psychiatric/Behavioral: Negative for self-injury. The patient is not nervous/anxious. Physical Exam:      /78   Pulse 87   Ht 4' 7\" (1.397 m)   Wt 161 lb (73 kg)   SpO2 97%   BMI 37.42 kg/m²   Estimated body mass index is 37.42 kg/m² as calculated from the following:    Height as of this encounter: 4' 7\" (1.397 m). Weight as of this encounter: 161 lb (73 kg). General:  Cody Smith is a 76y.o. year old female who appears her stated age. HEENT: Normocephalic atraumatic. Neck supple. Chest: regular rate; pulses equal  Abdomen: Soft nontender nondistended.   Ext: DP and PT pulses 2+, good cap refill  Neuro    Mentation  Appropriate affect  Registration intact  Orientation intact  Judgement intact to situation    Cranial Nerves:   Pupils equal and reactive to light  Extraocular motion intact  Face and shrug symmetric  Tongue midline  No dysarthria  v1-3 sensation symmetric, masseter tone symmetric  Hearing symmetric    Sensation: Intact    Motor  L deltoid 5/5; R deltoid 5/5  L biceps 5/5; R biceps 5/5  L triceps 5/5; R triceps 5/5  Bilateral hands/wrists 4/5, baseline finger contractures, L>R    L iliopsoas 5/5 , R iliopsoas 5/5  L quadriceps 5/5; R quadriceps 5/5  L Dorsiflexion 5/5; R dorsiflexion 5/5  L Plantarflexion 5/5; R plantarflexion 5/5  L EHL 5/5; R EHL 5/5    Reflexes  L Brachioradialis 2+/4; R brachioradialis 2+/4  L Biceps 2+/4; R Biceps 2+/4  L Triceps 2+/4; R Triceps 2+/4  L Patellar 2+/4: R Patellar 2+/4  L Achilles 2+/4; R Achilles 2+/4    hoffmans L: neg  hoffmans R: neg  Clonus L: neg  Clonus R: neg  Babinski L: neg  Babinski R: neg    No tenderness to palpation over cervical spine or with cervical range of motion    Studies Review:       XR cervical 6/14/2022:  FINDINGS:   Diffuse moderately severe degenerative and degenerative disc changes are   noted. A grade 1 anterolisthesis C3 upon C4 is noted with minimal grade 1   anterolisthesis C4 upon C5, stable without change between flexion and   extension views. No acute fracture or prevertebral soft tissue swelling is   noted. ENT notes reviewed    Assessment and Plan:      1. Other closed nondisplaced fracture of first cervical vertebra with routine healing, subsequent encounter    2. Closed odontoid fracture with type II morphology, posterior displacement, and routine healing, subsequent encounter          Plan: Patient is status post mechanical fall resulting in C1/C2 fractures. Denies any new sensorimotor deficits. No axial discomfort with palpation or range of motion. Flexion-extension x-rays without any evidence of instability. Okay to wean from cervical collar. Referral provided for physical therapy. Monitor for any new pain, numbness or tingling. We will tentatively plan for returning to work 9/10; 20 hours/week initially. Patient to return in 4 to 6 weeks for reevaluation. Followup: Return in about 4 weeks (around 9/7/2022), or if symptoms worsen or fail to improve. Prescriptions Ordered:  No orders of the defined types were placed in this encounter. Orders Placed:  Orders Placed This Encounter   Procedures    External Referral To Physical Therapy     Referral Priority:   Routine     Referral Type:   Eval and Treat     Referral Reason:   Specialty Services Required     Requested Specialty:   Physical Therapist     Number of Visits Requested:   1        Electronically signed by [address] [place], [place] [id number] CNP on 8/10/2022 at 1:08 PM    Please note that this chart was generated using voice recognition Dragon dictation software.   Although every effort was made to ensure the accuracy of this automated transcription, some errors in transcription may have occurred.

## 2022-08-30 ENCOUNTER — TELEPHONE (OUTPATIENT)
Dept: NEUROSURGERY | Age: 77
End: 2022-08-30

## 2022-08-30 NOTE — TELEPHONE ENCOUNTER
I faxed the documentations last week, 08/24 at 12:16pm. The fax confirmations were attached in the folder.

## 2022-08-30 NOTE — TELEPHONE ENCOUNTER
Patient call regarding ( Plan your return to work certification ) She states that she needs it to return to work. Please advise if this has been completed.

## 2022-09-09 ENCOUNTER — OFFICE VISIT (OUTPATIENT)
Dept: PRIMARY CARE CLINIC | Age: 77
End: 2022-09-09
Payer: COMMERCIAL

## 2022-09-09 VITALS
SYSTOLIC BLOOD PRESSURE: 123 MMHG | RESPIRATION RATE: 16 BRPM | HEART RATE: 70 BPM | DIASTOLIC BLOOD PRESSURE: 70 MMHG | BODY MASS INDEX: 37.42 KG/M2 | WEIGHT: 161 LBS

## 2022-09-09 DIAGNOSIS — Z23 NEED FOR INFLUENZA VACCINATION: ICD-10-CM

## 2022-09-09 DIAGNOSIS — E03.9 HYPOTHYROIDISM, UNSPECIFIED TYPE: ICD-10-CM

## 2022-09-09 DIAGNOSIS — I10 ESSENTIAL HYPERTENSION: Primary | ICD-10-CM

## 2022-09-09 PROCEDURE — G8399 PT W/DXA RESULTS DOCUMENT: HCPCS | Performed by: FAMILY MEDICINE

## 2022-09-09 PROCEDURE — PBSHW INFLUENZA, FLUAD, (AGE 65 Y+), IM, PF, 0.5 ML: Performed by: FAMILY MEDICINE

## 2022-09-09 PROCEDURE — G8427 DOCREV CUR MEDS BY ELIG CLIN: HCPCS | Performed by: FAMILY MEDICINE

## 2022-09-09 PROCEDURE — 99211 OFF/OP EST MAY X REQ PHY/QHP: CPT | Performed by: FAMILY MEDICINE

## 2022-09-09 PROCEDURE — 1090F PRES/ABSN URINE INCON ASSESS: CPT | Performed by: FAMILY MEDICINE

## 2022-09-09 PROCEDURE — 1036F TOBACCO NON-USER: CPT | Performed by: FAMILY MEDICINE

## 2022-09-09 PROCEDURE — 1123F ACP DISCUSS/DSCN MKR DOCD: CPT | Performed by: FAMILY MEDICINE

## 2022-09-09 PROCEDURE — 90694 VACC AIIV4 NO PRSRV 0.5ML IM: CPT | Performed by: FAMILY MEDICINE

## 2022-09-09 PROCEDURE — 99214 OFFICE O/P EST MOD 30 MIN: CPT | Performed by: FAMILY MEDICINE

## 2022-09-09 PROCEDURE — G8417 CALC BMI ABV UP PARAM F/U: HCPCS | Performed by: FAMILY MEDICINE

## 2022-09-09 RX ORDER — LEVOTHYROXINE SODIUM 88 UG/1
88 TABLET ORAL DAILY
Qty: 90 TABLET | Refills: 0 | Status: SHIPPED | OUTPATIENT
Start: 2022-09-09

## 2022-09-09 RX ORDER — HYDROCHLOROTHIAZIDE 25 MG/1
25 TABLET ORAL EVERY OTHER DAY
Qty: 90 TABLET | Refills: 0 | Status: SHIPPED | OUTPATIENT
Start: 2022-09-09

## 2022-09-09 RX ORDER — LISINOPRIL 5 MG/1
5 TABLET ORAL DAILY
Qty: 90 TABLET | Refills: 0 | Status: SHIPPED | OUTPATIENT
Start: 2022-09-09

## 2022-09-09 RX ORDER — ACETAMINOPHEN 500 MG
500 TABLET ORAL PRN
COMMUNITY

## 2022-09-09 NOTE — PATIENT INSTRUCTIONS
SURVEY:    You may be receiving a survey from [employer] regarding your visit today. You may get this in the mail, through your MyChart, or in your email. Please complete the survey to enable us to provide the highest quality of care to you and your family. If you cannot score us a very good (5 Stars) on any question, please call the office to discuss how we could of made your experience exceptional.    Thank you!     Dr. Teresita Breen, LPN  Janny Araiza, [address] [place] [address], [address], [address] [employer] [address]    Phone: [phone]  Fax: [phone]    Office Hours:   Jaya Holt, [address], F: 8-5

## 2022-09-23 ENCOUNTER — HOSPITAL ENCOUNTER (OUTPATIENT)
Age: 77
Discharge: HOME | End: 2022-09-25
Payer: COMMERCIAL

## 2022-09-23 ENCOUNTER — HOSPITAL ENCOUNTER (OUTPATIENT)
Dept: GENERAL RADIOLOGY | Age: 77
Discharge: HOME | End: 2022-09-25
Payer: COMMERCIAL

## 2022-09-23 ENCOUNTER — OFFICE VISIT (OUTPATIENT)
Dept: NEUROSURGERY | Age: 77
End: 2022-09-23
Payer: COMMERCIAL

## 2022-09-23 VITALS
TEMPERATURE: 97.2 F | OXYGEN SATURATION: 97 % | DIASTOLIC BLOOD PRESSURE: 76 MMHG | HEART RATE: 66 BPM | BODY MASS INDEX: 37.17 KG/M2 | HEIGHT: 55 IN | SYSTOLIC BLOOD PRESSURE: 123 MMHG | WEIGHT: 160.6 LBS

## 2022-09-23 DIAGNOSIS — S12.091D OTHER CLOSED NONDISPLACED FRACTURE OF FIRST CERVICAL VERTEBRA WITH ROUTINE HEALING, SUBSEQUENT ENCOUNTER: Primary | ICD-10-CM
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
## 2022-09-23 DIAGNOSIS — S12.091D OTHER CLOSED NONDISPLACED FRACTURE OF FIRST CERVICAL VERTEBRA WITH ROUTINE HEALING, SUBSEQUENT ENCOUNTER: ICD-10-CM

## 2022-09-23 DIAGNOSIS — S12.111D CLOSED ODONTOID FRACTURE WITH TYPE II MORPHOLOGY, POSTERIOR DISPLACEMENT, AND ROUTINE HEALING, SUBSEQUENT ENCOUNTER: ICD-10-CM

## 2022-09-23 PROCEDURE — 1090F PRES/ABSN URINE INCON ASSESS: CPT | Performed by: NURSE PRACTITIONER

## 2022-09-23 PROCEDURE — 1123F ACP DISCUSS/DSCN MKR DOCD: CPT | Performed by: NURSE PRACTITIONER

## 2022-09-23 PROCEDURE — 1036F TOBACCO NON-USER: CPT | Performed by: NURSE PRACTITIONER

## 2022-09-23 PROCEDURE — G8399 PT W/DXA RESULTS DOCUMENT: HCPCS | Performed by: NURSE PRACTITIONER

## 2022-09-23 PROCEDURE — 99214 OFFICE O/P EST MOD 30 MIN: CPT | Performed by: NURSE PRACTITIONER

## 2022-09-23 PROCEDURE — G8417 CALC BMI ABV UP PARAM F/U: HCPCS | Performed by: NURSE PRACTITIONER

## 2022-09-23 PROCEDURE — G8427 DOCREV CUR MEDS BY ELIG CLIN: HCPCS | Performed by: NURSE PRACTITIONER

## 2022-09-23 PROCEDURE — 72040 X-RAY EXAM NECK SPINE 2-3 VW: CPT

## 2022-09-23 NOTE — PROGRESS NOTES
[address]  [facility] [address] [facility] [address]  Dept: [phone]    Patient:  Jenifer Noyola  YOB: 1945  Date: 22    The patient is a 76 y.o. female who presents today for consult of the following problems:     Chief Complaint   Patient presents with    Follow-up         HPI:     Jenifer Noyola is a 76 y.o. female presents for follow-up of mechanical fall in late March, fell forward striking chin resulting in C1/C2 fractures. This was treated nonsurgically with Aspen cervical collar. Has been compliant with cervical collar. Denies any new numbness tingling or weakness to extremities. Utilizes walker for ambulation at baseline, this remains unchanged. Denies difficulty swallowing. Did complete flexion-extension x-rays, presents today for review. 2022:  Patient has been able to wean herself from her neck collar. Was able to resume working part-time. Does have occasional posterior cervical discomfort to lower cervical spine. Denies any numbness, tingling or weakness to upper extremities. No difficulty swallowing.     History:     Past Medical History:   Diagnosis Date    Arthritis     osteo    Edema     Hyperlipidemia     Hypertension     Hypothyroidism     Kidney stones     Osteoarthritis     Thyroid disease     Unspecified transient cerebral ischemia      Past Surgical History:   Procedure Laterality Date    APPENDECTOMY      CATARACT REMOVAL       SECTION      x4    CORNEAL TRANSPLANT      bilat    FINGER SURGERY Left 2018    INDEX FINGER    HAMMER TOE SURGERY      right and left    JOINT REPLACEMENT Left 10/15/14    TKA    KNEE ARTHROSCOPY Left     LITHOTRIPSY      REPLACEMENT SHOULDER TOTAL Right 2019    SHOULDER TOTAL ARTHROPLASTY, OPEN PARTIAL CLAVICULECTOMY performed by Ana Redding MD at [address] Right 06/11/2019    Dr. Pavan Fernando History   Problem Relation Age of Onset    Diabetes Mother     High Blood Pressure Mother     Diabetes Father     Cancer Father         lung    High Blood Pressure Father     Diabetes Sister     Heart Disease Sister      Current Outpatient Medications on File Prior to Visit   Medication Sig Dispense Refill    acetaminophen (TYLENOL) 500 MG tablet Take 500 mg by mouth as needed for Pain      lisinopril (PRINIVIL;ZESTRIL) 5 MG tablet Take 1 tablet by mouth daily 90 tablet 0    levothyroxine (SYNTHROID) 88 MCG tablet Take 1 tablet by mouth daily 90 tablet 0    hydroCHLOROthiazide (HYDRODIURIL) 25 MG tablet Take 1 tablet by mouth every other day 90 tablet 0    fluticasone (FLONASE) 50 MCG/ACT nasal spray 1 spray by Nasal route daily      fexofenadine (ALLEGRA) 180 MG tablet Take 180 mg by mouth in the morning. aspirin 81 MG EC tablet Take 81 mg by mouth daily      Calcium Carbonate (CALCIUM 600 PO) Take by mouth daily      Multiple Vitamins-Minerals (PRESERVISION AREDS 2) CAPS Take by mouth daily      Cholecalciferol (VITAMIN D3) 2000 UNITS CAPS Take 1,000 Int'l Units/day by mouth. No current facility-administered medications on file prior to visit. Social History     Tobacco Use    Smoking status: Never    Smokeless tobacco: Never   Vaping Use    Vaping Use: Never used   Substance Use Topics    Alcohol use: No    Drug use: Never       No Known Allergies    Review of Systems  Constitutional: Negative for activity change and appetite change. HENT: Negative for ear pain and facial swelling. Eyes: Negative for discharge and itching. Respiratory: Negative for choking and chest tightness. Cardiovascular: Negative for chest pain and leg swelling. Gastrointestinal: Negative for nausea and abdominal pain. Endocrine: Negative for cold intolerance and heat intolerance. Genitourinary: Negative for frequency and flank pain.    Musculoskeletal: neg  Babinski R: neg    No tenderness to palpation over cervical spine or with cervical range of motion    Studies Review:     XR cervical 9/23/2022:  FINDINGS:   Fracture of the posterior elements of C1 noted. This is unchanged. Stable   alignment in flexion extension at C1-2. Mild anterolisthesis C2 on C3, C3 on   C4 and C4 on C5 in flexion. Anterolisthesis of C4 on C5 reduces in extension     ENT notes reviewed    Assessment and Plan:      1. Other closed nondisplaced fracture of first cervical vertebra with routine healing, subsequent encounter    2. Closed odontoid fracture with type II morphology, posterior displacement, and routine healing, subsequent encounter    3. Fall from standing, subsequent encounter          Plan: Patient is status post mechanical fall resulting in C1/C2 fractures. Denies any new sensorimotor deficits. Has been able to wean herself from her cervical collar. Occasional aching to lower cervical spine. Does have multilevel degenerative changes throughout cervical spine. We will obtain flexion-extension x-rays to evaluate for any instability now that she has weaned from cervical collar. Followup: Return if symptoms worsen or fail to improve. Prescriptions Ordered:  No orders of the defined types were placed in this encounter. Orders Placed:  Orders Placed This Encounter   Procedures    XR CERVICAL SPINE FLEXION AND EXTENSION     Standing Status:   Future     Number of Occurrences:   1     Standing Expiration Date:   9/23/2023     Order Specific Question:   Reason for exam:     Answer:   eval for instability at C1/2 r/t prior fracture        Electronically signed by ЕЛЕНА Zepeda CNP on 9/26/2022 at 8:55 AM    Please note that this chart was generated using voice recognition Dragon dictation software. Although every effort was made to ensure the accuracy of this automated transcription, some errors in transcription may have occurred.

## 2022-09-26 ENCOUNTER — OFFICE VISIT (OUTPATIENT)
Dept: PRIMARY CARE CLINIC | Age: 77
End: 2022-09-26
Payer: COMMERCIAL

## 2022-09-26 VITALS
WEIGHT: 162 LBS | BODY MASS INDEX: 37.49 KG/M2 | HEIGHT: 55 IN | DIASTOLIC BLOOD PRESSURE: 76 MMHG | RESPIRATION RATE: 18 BRPM | SYSTOLIC BLOOD PRESSURE: 136 MMHG | HEART RATE: 70 BPM

## 2022-09-26 DIAGNOSIS — M54.41 ACUTE RIGHT-SIDED LOW BACK PAIN WITH RIGHT-SIDED SCIATICA: ICD-10-CM

## 2022-09-26 DIAGNOSIS — M79.604 PAIN OF RIGHT LOWER EXTREMITY: Primary | ICD-10-CM

## 2022-09-26 PROCEDURE — G8427 DOCREV CUR MEDS BY ELIG CLIN: HCPCS | Performed by: FAMILY MEDICINE

## 2022-09-26 PROCEDURE — G8399 PT W/DXA RESULTS DOCUMENT: HCPCS | Performed by: FAMILY MEDICINE

## 2022-09-26 PROCEDURE — G8417 CALC BMI ABV UP PARAM F/U: HCPCS | Performed by: FAMILY MEDICINE

## 2022-09-26 PROCEDURE — 1036F TOBACCO NON-USER: CPT | Performed by: FAMILY MEDICINE

## 2022-09-26 PROCEDURE — 99213 OFFICE O/P EST LOW 20 MIN: CPT | Performed by: FAMILY MEDICINE

## 2022-09-26 PROCEDURE — 1090F PRES/ABSN URINE INCON ASSESS: CPT | Performed by: FAMILY MEDICINE

## 2022-09-26 PROCEDURE — 1123F ACP DISCUSS/DSCN MKR DOCD: CPT | Performed by: FAMILY MEDICINE

## 2022-09-26 NOTE — PROGRESS NOTES
Patient here for right leg pain. She states this started in the last few days and has worsened since. She rates her pain at a 6 out of ten and describes the pain as aching,  and constant. She admits radiation of pain to shin and hip. She has been using Tylenol and heat with a little relief. Allergies:  Patient has no known allergies.     Past Medical History:    Past Medical History:   Diagnosis Date    Arthritis     osteo    Edema     Hyperlipidemia     Hypertension     Hypothyroidism     Kidney stones     Osteoarthritis     Thyroid disease     Unspecified transient cerebral ischemia        Past Surgical History:    Past Surgical History:   Procedure Laterality Date    APPENDECTOMY      CATARACT REMOVAL       SECTION      x4    CORNEAL TRANSPLANT      bilat    FINGER SURGERY Left 2018    INDEX FINGER    HAMMER TOE SURGERY      right and left    JOINT REPLACEMENT Left 10/15/14    TKA    KNEE ARTHROSCOPY Left     LITHOTRIPSY      REPLACEMENT SHOULDER TOTAL Right 2019    SHOULDER TOTAL ARTHROPLASTY, OPEN PARTIAL CLAVICULECTOMY performed by Reina Pineda MD at [address] Right 2019    Dr. Lanre Carlos History:   Social History     Tobacco Use    Smoking status: Never    Smokeless tobacco: Never   Substance Use Topics    Alcohol use: No       Family History:   Family History   Problem Relation Age of Onset    Diabetes Mother     High Blood Pressure Mother     Diabetes Father     Cancer Father         lung    High Blood Pressure Father     Diabetes Sister     Heart Disease Sister          Review of Systems:  Constitutional: negative for fevers or chills  Eyes: negative for visual disturbance   ENT: ne gative for sore throat or nasal congestion  Respiratory: negative for cough, shortness of breath and sputum  Cardiovascular: negative for chest pain or palpitations  Gastrointestinal: negative for abd pain, nausea, vomiting, diarrhea or constipation  Genitourinary: negative for dysuria, urgency or frequency  Musculoskeletal:  Back-low back  Pain,radiating to rt thigh  Integument/breast: negative for skin rash or lesions  Neurological: neg for  for unilateral weakness, numbness or tingling. Psych: negative for anxiety, depression, suicidial ideation and suicidal attempt. Objective:  Physical Exam:  GEN:   A & O x3, no apparent distress     lower Back-  Flexion- painful                        Extension- painful                        Rotation-  painful    SLR -  Rt leg -painful               Lt leg- normal                   MUSC: no joint tenderness, deformity or swelling  EXT: Extremities: + 2 pedal pulses, no edema or calf tenderness, and warm to touch. Normal nails without lesions  NEURO: Motor- normal                 Sensory- normal                  DTR-  Knee - normal                                              SKIN:  No skin lesions or rashes        Assessment:  1. Pain of right lower extremity    2. Acute right-sided low back pain with right-sided sciatica            Plan:  Orders Placed This Encounter   Procedures    [facility]  [place]     Referral Priority:   Routine     Referral Type:   Eval and Treat     Referral Reason:   Specialty Services Required     Requested Specialty:   Physical Therapist     Number of Visits Requested:   1     No follow-ups on file. No orders of the defined types were placed in this encounter.     Medication directions and side effects discussed    Electronically signed by Carline Mendez MD on 9/26/2022 at 1:35 PM

## 2022-12-13 ENCOUNTER — HOSPITAL ENCOUNTER (OUTPATIENT)
Age: 77
Discharge: HOME | End: 2022-12-13
Payer: COMMERCIAL

## 2022-12-13 ENCOUNTER — OFFICE VISIT (OUTPATIENT)
Dept: PRIMARY CARE CLINIC | Age: 77
End: 2022-12-13
Payer: MEDICARE

## 2022-12-13 VITALS
HEIGHT: 55 IN | OXYGEN SATURATION: 97 % | DIASTOLIC BLOOD PRESSURE: 80 MMHG | HEART RATE: 70 BPM | WEIGHT: 163.8 LBS | BODY MASS INDEX: 37.91 KG/M2 | RESPIRATION RATE: 18 BRPM | SYSTOLIC BLOOD PRESSURE: 118 MMHG

## 2022-12-13 DIAGNOSIS — I10 ESSENTIAL HYPERTENSION: Primary | ICD-10-CM

## 2022-12-13 DIAGNOSIS — E03.9 HYPOTHYROIDISM, UNSPECIFIED TYPE: ICD-10-CM

## 2022-12-13 DIAGNOSIS — I10 ESSENTIAL HYPERTENSION: ICD-10-CM

## 2022-12-13 LAB
ABSOLUTE EOS #: 0.11 K/UL (ref 0–0.44)
ABSOLUTE IMMATURE GRANULOCYTE: <0.03 K/UL (ref 0–0.3)
ABSOLUTE LYMPH #: 1.5 K/UL (ref 1.1–3.7)
ABSOLUTE MONO #: 0.88 K/UL (ref 0.1–1.2)
ALBUMIN SERPL-MCNC: 4.1 G/DL (ref 3.5–5.2)
ALBUMIN/GLOBULIN RATIO: 1.6 (ref 1–2.5)
ALP BLD-CCNC: 77 U/L (ref 35–104)
ALT SERPL-CCNC: 19 U/L (ref 5–33)
ANION GAP SERPL CALCULATED.3IONS-SCNC: 9 MMOL/L (ref 9–17)
AST SERPL-CCNC: 24 U/L
BASOPHILS # BLD: 0 % (ref 0–2)
BASOPHILS ABSOLUTE: <0.03 K/UL (ref 0–0.2)
BILIRUB SERPL-MCNC: 0.5 MG/DL (ref 0.3–1.2)
BUN BLDV-MCNC: 24 MG/DL (ref 8–23)
BUN/CREAT BLD: 31 (ref 9–20)
CALCIUM SERPL-MCNC: 9.6 MG/DL (ref 8.6–10.4)
CHLORIDE BLD-SCNC: 104 MMOL/L (ref 98–107)
CHOLESTEROL/HDL RATIO: 2.8
CHOLESTEROL: 163 MG/DL
CO2: 28 MMOL/L (ref 20–31)
CREAT SERPL-MCNC: 0.78 MG/DL (ref 0.5–0.9)
EOSINOPHILS RELATIVE PERCENT: 2 % (ref 1–4)
GFR SERPL CREATININE-BSD FRML MDRD: >60 ML/MIN/1.73M2
GLUCOSE BLD-MCNC: 94 MG/DL (ref 70–99)
HCT VFR BLD CALC: 37.8 % (ref 36.3–47.1)
HDLC SERPL-MCNC: 59 MG/DL
HEMOGLOBIN: 12.2 G/DL (ref 11.9–15.1)
IMMATURE GRANULOCYTES: 0 %
LDL CHOLESTEROL: 87 MG/DL (ref 0–130)
LYMPHOCYTES # BLD: 26 % (ref 24–43)
MCH RBC QN AUTO: 33.8 PG (ref 25.2–33.5)
MCHC RBC AUTO-ENTMCNC: 32.3 G/DL (ref 28.4–34.8)
MCV RBC AUTO: 104.7 FL (ref 82.6–102.9)
MONOCYTES # BLD: 15 % (ref 3–12)
NRBC AUTOMATED: 0 PER 100 WBC
PDW BLD-RTO: 13.6 % (ref 11.8–14.4)
PLATELET # BLD: 196 K/UL (ref 138–453)
PMV BLD AUTO: 9.8 FL (ref 8.1–13.5)
POTASSIUM SERPL-SCNC: 4.3 MMOL/L (ref 3.7–5.3)
RBC # BLD: 3.61 M/UL (ref 3.95–5.11)
SEG NEUTROPHILS: 57 % (ref 36–65)
SEGMENTED NEUTROPHILS ABSOLUTE COUNT: 3.29 K/UL (ref 1.5–8.1)
SODIUM BLD-SCNC: 141 MMOL/L (ref 135–144)
TOTAL PROTEIN: 6.7 G/DL (ref 6.4–8.3)
TRIGL SERPL-MCNC: 86 MG/DL
TSH SERPL DL<=0.05 MIU/L-ACNC: 2.97 UIU/ML (ref 0.3–5)
WBC # BLD: 5.8 K/UL (ref 3.5–11.3)

## 2022-12-13 PROCEDURE — 36415 COLL VENOUS BLD VENIPUNCTURE: CPT

## 2022-12-13 PROCEDURE — 84443 ASSAY THYROID STIM HORMONE: CPT

## 2022-12-13 PROCEDURE — G8427 DOCREV CUR MEDS BY ELIG CLIN: HCPCS | Performed by: FAMILY MEDICINE

## 2022-12-13 PROCEDURE — 3078F DIAST BP <80 MM HG: CPT | Performed by: FAMILY MEDICINE

## 2022-12-13 PROCEDURE — 99214 OFFICE O/P EST MOD 30 MIN: CPT | Performed by: FAMILY MEDICINE

## 2022-12-13 PROCEDURE — G8484 FLU IMMUNIZE NO ADMIN: HCPCS | Performed by: FAMILY MEDICINE

## 2022-12-13 PROCEDURE — 85025 COMPLETE CBC W/AUTO DIFF WBC: CPT

## 2022-12-13 PROCEDURE — 80061 LIPID PANEL: CPT

## 2022-12-13 PROCEDURE — 1090F PRES/ABSN URINE INCON ASSESS: CPT | Performed by: FAMILY MEDICINE

## 2022-12-13 PROCEDURE — G8399 PT W/DXA RESULTS DOCUMENT: HCPCS | Performed by: FAMILY MEDICINE

## 2022-12-13 PROCEDURE — 1123F ACP DISCUSS/DSCN MKR DOCD: CPT | Performed by: FAMILY MEDICINE

## 2022-12-13 PROCEDURE — 1036F TOBACCO NON-USER: CPT | Performed by: FAMILY MEDICINE

## 2022-12-13 PROCEDURE — 80053 COMPREHEN METABOLIC PANEL: CPT

## 2022-12-13 PROCEDURE — G8417 CALC BMI ABV UP PARAM F/U: HCPCS | Performed by: FAMILY MEDICINE

## 2022-12-13 PROCEDURE — 3074F SYST BP LT 130 MM HG: CPT | Performed by: FAMILY MEDICINE

## 2022-12-13 RX ORDER — LEVOTHYROXINE SODIUM 88 UG/1
88 TABLET ORAL DAILY
Qty: 90 TABLET | Refills: 0 | Status: SHIPPED | OUTPATIENT
Start: 2022-12-13

## 2022-12-13 RX ORDER — HYDROCHLOROTHIAZIDE 25 MG/1
25 TABLET ORAL EVERY OTHER DAY
Qty: 90 TABLET | Refills: 0 | Status: SHIPPED | OUTPATIENT
Start: 2022-12-13

## 2022-12-13 RX ORDER — LISINOPRIL 5 MG/1
5 TABLET ORAL DAILY
Qty: 90 TABLET | Refills: 0 | Status: SHIPPED | OUTPATIENT
Start: 2022-12-13

## 2022-12-13 SDOH — ECONOMIC STABILITY: FOOD INSECURITY: WITHIN THE PAST 12 MONTHS, THE FOOD YOU BOUGHT JUST DIDN'T LAST AND YOU DIDN'T HAVE MONEY TO GET MORE.: NEVER TRUE

## 2022-12-13 SDOH — ECONOMIC STABILITY: FOOD INSECURITY: WITHIN THE PAST 12 MONTHS, YOU WORRIED THAT YOUR FOOD WOULD RUN OUT BEFORE YOU GOT MONEY TO BUY MORE.: NEVER TRUE

## 2022-12-13 ASSESSMENT — SOCIAL DETERMINANTS OF HEALTH (SDOH): HOW HARD IS IT FOR YOU TO PAY FOR THE VERY BASICS LIKE FOOD, HOUSING, MEDICAL CARE, AND HEATING?: NOT HARD AT ALL

## 2022-12-13 NOTE — PROGRESS NOTES
Hypertension: Patient here for follow-up of elevated blood pressure. She is exercising and is adherent to low salt diet. Blood pressure is well monitored at home. Cardiac symptoms none. Patient denies chest pain, dyspnea, fatigue, and palpitations. Cardiovascular risk factors: advanced age (older than 54 for men, 72 for women), hypertension, and sedentary lifestyle. Use of agents associated with hypertension: none. Hypothyroidism: Patient reports feeling fatigue at times. Allergies:  Patient has no known allergies.     Past Medical History:    Past Medical History:   Diagnosis Date    Arthritis     osteo    Edema     Hyperlipidemia     Hypertension     Hypothyroidism     Kidney stones     Osteoarthritis     Thyroid disease     Unspecified transient cerebral ischemia        Past Surgical History:    Past Surgical History:   Procedure Laterality Date    APPENDECTOMY      CATARACT REMOVAL       SECTION      x4    CORNEAL TRANSPLANT      bilat    FINGER SURGERY Left 2018    INDEX FINGER    HAMMER TOE SURGERY      right and left    JOINT REPLACEMENT Left 10/15/14    TKA    KNEE ARTHROSCOPY Left     LITHOTRIPSY      REPLACEMENT SHOULDER TOTAL Right 2019    SHOULDER TOTAL ARTHROPLASTY, OPEN PARTIAL CLAVICULECTOMY performed by Jamarcus Quintreos MD at [address] Right 2019    Dr. Lara  History:   Social History     Tobacco Use    Smoking status: Never    Smokeless tobacco: Never   Substance Use Topics    Alcohol use: No       Family History:   Family History   Problem Relation Age of Onset    Diabetes Mother     High Blood Pressure Mother     Diabetes Father     Cancer Father         lung    High Blood Pressure Father     Diabetes Sister     Heart Disease Sister          Review of Systems:  Constitutional: negative for fever or chills  Eyes: negative for visual disturbance   ENT: negative for sore throat or nasal congestion  Respiratory: neg for cough or shortness of breath  Cardiovascular: negative for chest pain or palpitations  Gastrointestinal: negative for abd pain, nausea, vomiting, diarrhea or constipation  Genitourinary: negative for dysuria, urgency or frequency  Integument/breast: negative for skin rash or lesions  Neurological: negative for unilateral weakness, numbness or tingling. Skeletal Muscular: has multiple deformed joints      Objective:  Physical Exam:  GEN:   A & O x3, no apparent distress  EYES: No gross abnormalities. ENT:ENT exam normal, no neck nodes or sinus tenderness  NECK: normal, supple, no lymphadenopathy,  no carotid bruits  PULM: clear to auscultation bilaterally- no wheezes, rales or rhonchi, normal air movement, no respiratory distress  COR: regular rate & rhythm, no murmurs, and no gallops  ABD:  soft, non-tender, non-distended, normal bowel sounds, no masses or organomegaly  : deferred  EXT: has multiple non tender,deformed joints  NEURO: Motor and sensory grossly intact  SKIN:  No skin lesions or rashes        Labs:  Lab Results   Component Value Date    GLUCOSE 156 (H) 03/23/2022    BUN 15 07/07/2022    CREATININE 0.65 07/07/2022     03/23/2022    K 3.4 (L) 03/23/2022    CALCIUM 9.8 03/23/2022    CL 99 03/23/2022    CO2 29 03/23/2022    LABGLOM >60 07/07/2022       Assessment:  1. Essential hypertension    2. Hypothyroidism, unspecified type          Plan:  1. Essential hypertension -well controlled with lisinopril,hctz,check cbc,cmp,lipid   2. Hypothyroidism, unspecified type -well controlled synthroid- check tsh     Encouraged low fat and low sodium diet.   Goal for blood pressure control is 130/80  Recommended regular exercise as tolerated, 3-5 times per day      Orders Placed This Encounter   Procedures    CBC with Auto Differential     Standing Status:   Future     Standing Expiration Date:   12/13/2023    Comprehensive Metabolic Panel     Standing Status:   Future     Standing Expiration Date: 12/13/2023    Lipid Panel     Standing Status:   Future     Standing Expiration Date:   12/13/2023     Order Specific Question:   Is Patient Fasting?/# of Hours     Answer:   12    TSH     Standing Status:   Future     Standing Expiration Date:   12/13/2023       Current Outpatient Medications   Medication Sig Dispense Refill    lisinopril (PRINIVIL;ZESTRIL) 5 MG tablet Take 1 tablet by mouth daily 90 tablet 0    levothyroxine (SYNTHROID) 88 MCG tablet Take 1 tablet by mouth daily 90 tablet 0    hydroCHLOROthiazide (HYDRODIURIL) 25 MG tablet Take 1 tablet by mouth every other day 90 tablet 0    acetaminophen (TYLENOL) 500 MG tablet Take 500 mg by mouth as needed for Pain      fluticasone (FLONASE) 50 MCG/ACT nasal spray 1 spray by Nasal route daily      fexofenadine (ALLEGRA) 180 MG tablet Take 180 mg by mouth in the morning. aspirin 81 MG EC tablet Take 81 mg by mouth daily      Calcium Carbonate (CALCIUM 600 PO) Take by mouth daily      Multiple Vitamins-Minerals (PRESERVISION AREDS 2) CAPS Take by mouth daily      Cholecalciferol (VITAMIN D3) 2000 UNITS CAPS Take 1,000 Int'l Units/day by mouth. No current facility-administered medications for this visit. Return in 3 months (on 3/13/2023) for hypertension.       Electronically signed by Mehnaz Diggs MD on 12/13/2022 at 10:46 AM

## 2022-12-13 NOTE — PATIENT INSTRUCTIONS
SURVEY:    You may be receiving a survey from [employer] regarding your visit today. You may get this in the mail, through your MyChart, or in your email. Please complete the survey to enable us to provide the highest quality of care to you and your family. If you cannot score us a very good (5 Stars) on any question, please call the office to discuss how we could of made your experience exceptional.    Thank you!     Dr. Pretty Pickens, FOUZIA Gómez, KIERAN Perera, [address] [place] [address]    Phone: [phone]  Fax: [phone]    Office Hours:   Romi Portillo, [address] [facility], F: 8-5

## 2023-03-28 ENCOUNTER — OFFICE VISIT (OUTPATIENT)
Dept: PRIMARY CARE CLINIC | Age: 78
End: 2023-03-28
Payer: COMMERCIAL

## 2023-03-28 VITALS
BODY MASS INDEX: 38.74 KG/M2 | HEART RATE: 76 BPM | RESPIRATION RATE: 18 BRPM | HEIGHT: 55 IN | WEIGHT: 167.4 LBS | SYSTOLIC BLOOD PRESSURE: 132 MMHG | DIASTOLIC BLOOD PRESSURE: 82 MMHG | OXYGEN SATURATION: 98 %

## 2023-03-28 DIAGNOSIS — E03.9 HYPOTHYROIDISM, UNSPECIFIED TYPE: ICD-10-CM

## 2023-03-28 DIAGNOSIS — I10 ESSENTIAL HYPERTENSION: Primary | ICD-10-CM

## 2023-03-28 PROBLEM — I77.74 VERTEBRAL ARTERY DISSECTION (HCC): Status: ACTIVE | Noted: 2022-03-24

## 2023-03-28 PROCEDURE — 3075F SYST BP GE 130 - 139MM HG: CPT | Performed by: FAMILY MEDICINE

## 2023-03-28 PROCEDURE — G8484 FLU IMMUNIZE NO ADMIN: HCPCS | Performed by: FAMILY MEDICINE

## 2023-03-28 PROCEDURE — 99213 OFFICE O/P EST LOW 20 MIN: CPT | Performed by: FAMILY MEDICINE

## 2023-03-28 PROCEDURE — G8427 DOCREV CUR MEDS BY ELIG CLIN: HCPCS | Performed by: FAMILY MEDICINE

## 2023-03-28 PROCEDURE — 1036F TOBACCO NON-USER: CPT | Performed by: FAMILY MEDICINE

## 2023-03-28 PROCEDURE — 1090F PRES/ABSN URINE INCON ASSESS: CPT | Performed by: FAMILY MEDICINE

## 2023-03-28 PROCEDURE — G8417 CALC BMI ABV UP PARAM F/U: HCPCS | Performed by: FAMILY MEDICINE

## 2023-03-28 PROCEDURE — 3079F DIAST BP 80-89 MM HG: CPT | Performed by: FAMILY MEDICINE

## 2023-03-28 PROCEDURE — 1123F ACP DISCUSS/DSCN MKR DOCD: CPT | Performed by: FAMILY MEDICINE

## 2023-03-28 PROCEDURE — G8399 PT W/DXA RESULTS DOCUMENT: HCPCS | Performed by: FAMILY MEDICINE

## 2023-03-28 RX ORDER — LEVOTHYROXINE SODIUM 88 UG/1
88 TABLET ORAL DAILY
Qty: 90 TABLET | Refills: 0 | Status: SHIPPED | OUTPATIENT
Start: 2023-03-28

## 2023-03-28 SDOH — ECONOMIC STABILITY: FOOD INSECURITY: WITHIN THE PAST 12 MONTHS, THE FOOD YOU BOUGHT JUST DIDN'T LAST AND YOU DIDN'T HAVE MONEY TO GET MORE.: NEVER TRUE

## 2023-03-28 SDOH — ECONOMIC STABILITY: HOUSING INSECURITY
IN THE LAST 12 MONTHS, WAS THERE A TIME WHEN YOU DID NOT HAVE A STEADY PLACE TO SLEEP OR SLEPT IN A SHELTER (INCLUDING NOW)?: NO

## 2023-03-28 SDOH — ECONOMIC STABILITY: INCOME INSECURITY: HOW HARD IS IT FOR YOU TO PAY FOR THE VERY BASICS LIKE FOOD, HOUSING, MEDICAL CARE, AND HEATING?: NOT HARD AT ALL

## 2023-03-28 SDOH — ECONOMIC STABILITY: FOOD INSECURITY: WITHIN THE PAST 12 MONTHS, YOU WORRIED THAT YOUR FOOD WOULD RUN OUT BEFORE YOU GOT MONEY TO BUY MORE.: NEVER TRUE

## 2023-03-28 ASSESSMENT — PATIENT HEALTH QUESTIONNAIRE - PHQ9
2. FEELING DOWN, DEPRESSED OR HOPELESS: 0
SUM OF ALL RESPONSES TO PHQ QUESTIONS 1-9: 0
1. LITTLE INTEREST OR PLEASURE IN DOING THINGS: 0
SUM OF ALL RESPONSES TO PHQ QUESTIONS 1-9: 0
SUM OF ALL RESPONSES TO PHQ9 QUESTIONS 1 & 2: 0
SUM OF ALL RESPONSES TO PHQ QUESTIONS 1-9: 0
SUM OF ALL RESPONSES TO PHQ QUESTIONS 1-9: 0

## 2023-03-28 NOTE — PROGRESS NOTES
Hypertension: Patient here for follow-up of elevated blood pressure. She is exercising and is adherent to low salt diet. Blood pressure is well controlled at home. Cardiac symptoms none. Patient denies chest pain, dyspnea, fatigue, and palpitations. Cardiovascular risk factors: advanced age (older than 54 for men, 72 for women), hypertension, and obesity (BMI >= 30 kg/m2). Use of agents associated with hypertension: none. Hypothyroidism: Patient reports doing well. Patient continues with somewhat fatigue. Allergies:  Patient has no known allergies.     Past Medical History:    Past Medical History:   Diagnosis Date    Arthritis     osteo    Edema     Hyperlipidemia     Hypertension     Hypothyroidism     Kidney stones     Osteoarthritis     Thyroid disease     Unspecified transient cerebral ischemia        Past Surgical History:    Past Surgical History:   Procedure Laterality Date    APPENDECTOMY      CATARACT REMOVAL       SECTION      x4    CORNEAL TRANSPLANT      bilat    FINGER SURGERY Left 2018    INDEX FINGER    HAMMER TOE SURGERY      right and left    JOINT REPLACEMENT Left 10/15/14    TKA    KNEE ARTHROSCOPY Left     LITHOTRIPSY      REPLACEMENT SHOULDER TOTAL Right 2019    SHOULDER TOTAL ARTHROPLASTY, OPEN PARTIAL CLAVICULECTOMY performed by Sia Harden MD at [address] Right 2019    Dr. Ware [facility] History:   Social History     Tobacco Use    Smoking status: Never    Smokeless tobacco: Never   Substance Use Topics    Alcohol use: No       Family History:   Family History   Problem Relation Age of Onset    Diabetes Mother     High Blood Pressure Mother     Diabetes Father     Cancer Father         lung    High Blood Pressure Father     Diabetes Sister     Heart Disease Sister          Review of Systems:  Constitutional: negative for fever or chills  Eyes: negative for visual disturbance   ENT: negative for sore throat or

## 2023-03-28 NOTE — PATIENT INSTRUCTIONS
SURVEY:    You may be receiving a survey from [employer] regarding your visit today. You may get this in the mail, through your MyChart, or in your email. Please complete the survey to enable us to provide the highest quality of care to you and your family. If you cannot score us a very good (5 Stars) on any question, please call the office to discuss how we could of made your experience exceptional.    Thank you!     FOUZIA Calhoun RN   [facility], [address] [place] [address]    Phone: [phone]  Fax: [phone]    Office Hours:   Sophie Campos, [address], F: 8-5

## 2023-04-21 ENCOUNTER — HOSPITAL ENCOUNTER (OUTPATIENT)
Dept: VASCULAR LAB | Age: 78
Discharge: HOME | End: 2023-04-21
Payer: COMMERCIAL

## 2023-04-21 DIAGNOSIS — R09.89 LEFT CAROTID BRUIT: ICD-10-CM

## 2023-04-21 PROCEDURE — 93880 EXTRACRANIAL BILAT STUDY: CPT

## 2023-04-25 ENCOUNTER — OFFICE VISIT (OUTPATIENT)
Dept: PRIMARY CARE CLINIC | Age: 78
End: 2023-04-25
Payer: COMMERCIAL

## 2023-04-25 VITALS
DIASTOLIC BLOOD PRESSURE: 82 MMHG | SYSTOLIC BLOOD PRESSURE: 120 MMHG | WEIGHT: 170.2 LBS | HEART RATE: 76 BPM | BODY MASS INDEX: 39.56 KG/M2 | RESPIRATION RATE: 16 BRPM | OXYGEN SATURATION: 96 %

## 2023-04-25 DIAGNOSIS — R42 DIZZINESS: ICD-10-CM

## 2023-04-25 DIAGNOSIS — I10 ESSENTIAL HYPERTENSION: Primary | ICD-10-CM

## 2023-04-25 PROCEDURE — 1090F PRES/ABSN URINE INCON ASSESS: CPT | Performed by: STUDENT IN AN ORGANIZED HEALTH CARE EDUCATION/TRAINING PROGRAM

## 2023-04-25 PROCEDURE — 99214 OFFICE O/P EST MOD 30 MIN: CPT | Performed by: STUDENT IN AN ORGANIZED HEALTH CARE EDUCATION/TRAINING PROGRAM

## 2023-04-25 PROCEDURE — G8417 CALC BMI ABV UP PARAM F/U: HCPCS | Performed by: STUDENT IN AN ORGANIZED HEALTH CARE EDUCATION/TRAINING PROGRAM

## 2023-04-25 PROCEDURE — G8399 PT W/DXA RESULTS DOCUMENT: HCPCS | Performed by: STUDENT IN AN ORGANIZED HEALTH CARE EDUCATION/TRAINING PROGRAM

## 2023-04-25 PROCEDURE — 3074F SYST BP LT 130 MM HG: CPT | Performed by: STUDENT IN AN ORGANIZED HEALTH CARE EDUCATION/TRAINING PROGRAM

## 2023-04-25 PROCEDURE — G8427 DOCREV CUR MEDS BY ELIG CLIN: HCPCS | Performed by: STUDENT IN AN ORGANIZED HEALTH CARE EDUCATION/TRAINING PROGRAM

## 2023-04-25 PROCEDURE — 1036F TOBACCO NON-USER: CPT | Performed by: STUDENT IN AN ORGANIZED HEALTH CARE EDUCATION/TRAINING PROGRAM

## 2023-04-25 PROCEDURE — 3079F DIAST BP 80-89 MM HG: CPT | Performed by: STUDENT IN AN ORGANIZED HEALTH CARE EDUCATION/TRAINING PROGRAM

## 2023-04-25 PROCEDURE — 1123F ACP DISCUSS/DSCN MKR DOCD: CPT | Performed by: STUDENT IN AN ORGANIZED HEALTH CARE EDUCATION/TRAINING PROGRAM

## 2023-04-25 RX ORDER — LISINOPRIL 5 MG/1
5 TABLET ORAL DAILY
Qty: 90 TABLET | Refills: 0 | Status: SHIPPED | OUTPATIENT
Start: 2023-04-25

## 2023-04-25 RX ORDER — MECLIZINE HYDROCHLORIDE 25 MG/1
25 TABLET ORAL 3 TIMES DAILY PRN
Qty: 42 TABLET | Refills: 0 | Status: SHIPPED | OUTPATIENT
Start: 2023-04-25 | End: 2023-05-09

## 2023-04-25 NOTE — PROGRESS NOTES
change, appetite change, chills, diaphoresis, fatigue, fever and unexpected weight change. HENT: Negative for sinus pressure, sinus pain, sore throat and trouble swallowing. Respiratory: Negative for cough, shortness of breath and wheezing. Cardiovascular: Negative for chest pain, palpitations and leg swelling. Gastrointestinal: Negative for abdominal pain, diarrhea, nausea and vomiting. Endocrine: Negative for cold intolerance, polydipsia, polyphagia and polyuria. Genitourinary: Negative for difficulty urinating, flank pain and frequency. Musculoskeletal: Negative for gait problem and joint swelling. Negative for back pain, neck pain and neck stiffness. Skin: Negative for color change and wound. Negative for pallor and rash. Allergic/Immunologic: Negative for environmental allergies and food allergies. Neurological: Negative for light-headedness, numbness and headaches. Hx of dizziness. Psychiatric/Behavioral: Negative for sleep disturbance. Negative for confusion and suicidal ideas. Objective:    /82 (Site: Left Upper Arm, Position: Sitting)   Pulse 76   Resp 16   Wt 170 lb 3.2 oz (77.2 kg)   SpO2 96%   BMI 39.56 kg/m²    BP Readings from Last 3 Encounters:   04/25/23 120/82   04/11/23 124/80   04/10/23 139/66     Physical Exam  Constitutional: Patient is oriented to person, place, and time. Patient appears well-developed and well-nourished. No distress. HENT: Head: Normocephalic and atraumatic. Eyes: Pupils are equal, round, and reactive to light. Conjunctivae are normal. Right eye exhibits no discharge. Left eye exhibits no discharge. Cardiovascular: Normal rate, regular rhythm and normal heart sounds. Pulmonary/Chest: Effort normal and breath sounds normal. No respiratory distress. Patient has no wheezes. Abdominal: Soft. Bowel sounds are normal. Patient exhibits no distension. There is no tenderness. Musculoskeletal:  Patient exhibits no edema and tenderness.

## 2023-04-25 NOTE — PATIENT INSTRUCTIONS
SURVEY:    You may be receiving a survey from [employer] regarding your visit today. Please complete the survey to enable us to provide the highest quality of care to you and your family. If you cannot score us a very good on any question, please call the office to discuss how we could have made your experience a very good one. Thank you.

## 2023-06-22 DIAGNOSIS — E03.9 HYPOTHYROIDISM, UNSPECIFIED TYPE: ICD-10-CM

## 2023-06-22 RX ORDER — LEVOTHYROXINE SODIUM 88 UG/1
88 TABLET ORAL DAILY
Qty: 90 TABLET | Refills: 0 | Status: SHIPPED | OUTPATIENT
Start: 2023-06-22

## 2023-06-22 RX ORDER — HYDROCHLOROTHIAZIDE 25 MG/1
25 TABLET ORAL EVERY OTHER DAY
Qty: 90 TABLET | Refills: 0 | Status: SHIPPED | OUTPATIENT
Start: 2023-06-22

## 2023-07-06 DIAGNOSIS — E03.9 HYPOTHYROIDISM, UNSPECIFIED TYPE: ICD-10-CM

## 2023-07-06 RX ORDER — HYDROCHLOROTHIAZIDE 25 MG/1
25 TABLET ORAL EVERY OTHER DAY
Qty: 90 TABLET | Refills: 0 | Status: SHIPPED | OUTPATIENT
Start: 2023-07-06

## 2023-07-06 RX ORDER — LEVOTHYROXINE SODIUM 88 UG/1
88 TABLET ORAL DAILY
Qty: 90 TABLET | Refills: 0 | Status: SHIPPED | OUTPATIENT
Start: 2023-07-06

## 2023-07-21 DIAGNOSIS — I10 ESSENTIAL HYPERTENSION: ICD-10-CM

## 2023-07-21 RX ORDER — LISINOPRIL 5 MG/1
5 TABLET ORAL DAILY
Qty: 90 TABLET | Refills: 0 | Status: SHIPPED | OUTPATIENT
Start: 2023-07-21

## 2023-09-26 ENCOUNTER — OFFICE VISIT (OUTPATIENT)
Dept: PRIMARY CARE CLINIC | Age: 78
End: 2023-09-26
Payer: MEDICARE

## 2023-09-26 VITALS
BODY MASS INDEX: 39.34 KG/M2 | SYSTOLIC BLOOD PRESSURE: 138 MMHG | HEIGHT: 55 IN | DIASTOLIC BLOOD PRESSURE: 78 MMHG | HEART RATE: 70 BPM | RESPIRATION RATE: 16 BRPM | WEIGHT: 170 LBS

## 2023-09-26 DIAGNOSIS — E03.9 HYPOTHYROIDISM, UNSPECIFIED TYPE: ICD-10-CM

## 2023-09-26 DIAGNOSIS — Z71.89 ACP (ADVANCE CARE PLANNING): ICD-10-CM

## 2023-09-26 DIAGNOSIS — R60.0 LEG EDEMA: ICD-10-CM

## 2023-09-26 DIAGNOSIS — Z00.00 MEDICARE ANNUAL WELLNESS VISIT, SUBSEQUENT: Primary | ICD-10-CM

## 2023-09-26 PROCEDURE — G0439 PPPS, SUBSEQ VISIT: HCPCS | Performed by: STUDENT IN AN ORGANIZED HEALTH CARE EDUCATION/TRAINING PROGRAM

## 2023-09-26 PROCEDURE — 3078F DIAST BP <80 MM HG: CPT | Performed by: STUDENT IN AN ORGANIZED HEALTH CARE EDUCATION/TRAINING PROGRAM

## 2023-09-26 PROCEDURE — 99497 ADVNCD CARE PLAN 30 MIN: CPT | Performed by: STUDENT IN AN ORGANIZED HEALTH CARE EDUCATION/TRAINING PROGRAM

## 2023-09-26 PROCEDURE — 1123F ACP DISCUSS/DSCN MKR DOCD: CPT | Performed by: STUDENT IN AN ORGANIZED HEALTH CARE EDUCATION/TRAINING PROGRAM

## 2023-09-26 PROCEDURE — 3075F SYST BP GE 130 - 139MM HG: CPT | Performed by: STUDENT IN AN ORGANIZED HEALTH CARE EDUCATION/TRAINING PROGRAM

## 2023-09-26 RX ORDER — LEVOTHYROXINE SODIUM 88 UG/1
88 TABLET ORAL DAILY
Qty: 90 TABLET | Refills: 0 | Status: SHIPPED | OUTPATIENT
Start: 2023-09-26

## 2023-09-26 RX ORDER — FUROSEMIDE 20 MG/1
20 TABLET ORAL DAILY
Qty: 60 TABLET | Refills: 3 | Status: SHIPPED | OUTPATIENT
Start: 2023-09-26

## 2023-09-26 ASSESSMENT — PATIENT HEALTH QUESTIONNAIRE - PHQ9
SUM OF ALL RESPONSES TO PHQ QUESTIONS 1-9: 0
1. LITTLE INTEREST OR PLEASURE IN DOING THINGS: 0
SUM OF ALL RESPONSES TO PHQ QUESTIONS 1-9: 0
2. FEELING DOWN, DEPRESSED OR HOPELESS: 0
SUM OF ALL RESPONSES TO PHQ QUESTIONS 1-9: 0
SUM OF ALL RESPONSES TO PHQ9 QUESTIONS 1 & 2: 0
SUM OF ALL RESPONSES TO PHQ QUESTIONS 1-9: 0

## 2023-09-26 ASSESSMENT — LIFESTYLE VARIABLES
HOW OFTEN DO YOU HAVE A DRINK CONTAINING ALCOHOL: NEVER
HOW MANY STANDARD DRINKS CONTAINING ALCOHOL DO YOU HAVE ON A TYPICAL DAY: PATIENT DOES NOT DRINK

## 2023-09-26 NOTE — PATIENT INSTRUCTIONS
joints. Stretching. Stretching gives you better range of motion in joints and muscles. Includes upper arm stretches, calf stretches, and gentle yoga. Aim for at least twice a week, preferably after your muscles are warmed up from other activities. It can help you function better in daily life. Balancing. This helps you stay coordinated and have good posture. Includes heel-to-toe walking, kalli chi, and certain types of yoga. Aim for at least 3 days a week. It can reduce your risk of falling. Even if you have a hard time meeting the recommendations, it's better to be more active than less active. All activity done in each category counts toward your weekly total. You'd be surprised how daily things like carrying groceries, keeping up with grandchildren, and taking the stairs can add up. What keeps you from being active? If you've had a hard time being more active, you're not alone. Maybe you remember being able to do more. Or maybe you've never thought of yourself as being active. It's frustrating when you can't do the things you want. Being more active can help. What's holding you back? Getting started. Have a goal, but break it into easy tasks. Small steps build into big accomplishments. Staying motivated. If you feel like skipping your activity, remember your goal. Maybe you want to move better and stay independent. Every activity gets you one step closer. Not feeling your best.  Start with 5 minutes of an activity you enjoy. Prove to yourself you can do it. As you get comfortable, increase your time. You may not be where you want to be. But you're in the process of getting there. Everyone starts somewhere. How can you find safe ways to stay active? Talk with your doctor about any physical challenges you're facing. Make a plan with your doctor if you have a health problem or aren't sure how to get started with activity.   If you're already active, ask your doctor if there is anything you should

## 2023-10-17 DIAGNOSIS — I10 ESSENTIAL HYPERTENSION: ICD-10-CM

## 2023-10-17 RX ORDER — LISINOPRIL 5 MG/1
5 TABLET ORAL DAILY
Qty: 90 TABLET | Refills: 1 | Status: SHIPPED | OUTPATIENT
Start: 2023-10-17

## 2023-10-17 NOTE — TELEPHONE ENCOUNTER
Hello, please schedule follow-up. Thank you.   Electronically signed by Briana Martin MD on 10/17/2023 at 7:46 PM

## 2023-10-26 PROBLEM — Z00.00 MEDICARE ANNUAL WELLNESS VISIT, SUBSEQUENT: Status: RESOLVED | Noted: 2023-09-26 | Resolved: 2023-10-26

## 2023-12-22 DIAGNOSIS — E03.9 HYPOTHYROIDISM, UNSPECIFIED TYPE: ICD-10-CM

## 2023-12-27 RX ORDER — LEVOTHYROXINE SODIUM 88 UG/1
88 TABLET ORAL DAILY
Qty: 90 TABLET | Refills: 0 | Status: SHIPPED | OUTPATIENT
Start: 2023-12-27 | End: 2023-12-28 | Stop reason: SDUPTHER

## 2023-12-27 NOTE — TELEPHONE ENCOUNTER
Please schedule f/u visit thank you.  Electronically signed by Fabio Rosas MD on 12/27/2023 at 8:07 AM

## 2023-12-28 DIAGNOSIS — I10 ESSENTIAL HYPERTENSION: ICD-10-CM

## 2023-12-28 DIAGNOSIS — R60.0 LEG EDEMA: ICD-10-CM

## 2023-12-28 DIAGNOSIS — E03.9 HYPOTHYROIDISM, UNSPECIFIED TYPE: ICD-10-CM

## 2023-12-28 RX ORDER — LISINOPRIL 5 MG/1
5 TABLET ORAL DAILY
Qty: 90 TABLET | Refills: 0 | Status: SHIPPED | OUTPATIENT
Start: 2023-12-28

## 2023-12-28 RX ORDER — LEVOTHYROXINE SODIUM 88 UG/1
88 TABLET ORAL DAILY
Qty: 90 TABLET | Refills: 0 | Status: SHIPPED | OUTPATIENT
Start: 2023-12-28

## 2023-12-28 RX ORDER — HYDROCHLOROTHIAZIDE 25 MG/1
25 TABLET ORAL EVERY OTHER DAY
Qty: 90 TABLET | Refills: 0 | Status: SHIPPED | OUTPATIENT
Start: 2023-12-28

## 2024-04-02 DIAGNOSIS — E03.9 HYPOTHYROIDISM, UNSPECIFIED TYPE: ICD-10-CM

## 2024-04-02 RX ORDER — LEVOTHYROXINE SODIUM 88 UG/1
88 TABLET ORAL DAILY
Qty: 90 TABLET | Refills: 0 | Status: SHIPPED | OUTPATIENT
Start: 2024-04-02

## 2024-04-16 ENCOUNTER — OFFICE VISIT (OUTPATIENT)
Dept: PRIMARY CARE CLINIC | Age: 79
End: 2024-04-16
Payer: COMMERCIAL

## 2024-04-16 ENCOUNTER — HOSPITAL ENCOUNTER (OUTPATIENT)
Age: 79
Discharge: HOME | End: 2024-04-16
Payer: MEDICARE

## 2024-04-16 VITALS
HEART RATE: 86 BPM | WEIGHT: 169 LBS | SYSTOLIC BLOOD PRESSURE: 100 MMHG | OXYGEN SATURATION: 95 % | BODY MASS INDEX: 39.11 KG/M2 | DIASTOLIC BLOOD PRESSURE: 68 MMHG | RESPIRATION RATE: 16 BRPM | HEIGHT: 55 IN

## 2024-04-16 DIAGNOSIS — I77.74 VERTEBRAL ARTERY DISSECTION (HCC): ICD-10-CM

## 2024-04-16 DIAGNOSIS — I10 ESSENTIAL HYPERTENSION: ICD-10-CM

## 2024-04-16 DIAGNOSIS — R42 VERTIGO: ICD-10-CM

## 2024-04-16 DIAGNOSIS — I10 ESSENTIAL HYPERTENSION: Primary | ICD-10-CM

## 2024-04-16 DIAGNOSIS — E03.9 HYPOTHYROIDISM, UNSPECIFIED TYPE: ICD-10-CM

## 2024-04-16 DIAGNOSIS — Z13.220 LIPID SCREENING: ICD-10-CM

## 2024-04-16 LAB
25(OH)D3 SERPL-MCNC: 32.2 NG/ML (ref 30–100)
ALBUMIN SERPL-MCNC: 4.2 G/DL (ref 3.5–5.2)
ALBUMIN/GLOB SERPL: 1.3 {RATIO} (ref 1–2.5)
ALP SERPL-CCNC: 72 U/L (ref 35–104)
ALT SERPL-CCNC: 23 U/L (ref 5–33)
ANION GAP SERPL CALCULATED.3IONS-SCNC: 11 MMOL/L (ref 9–17)
AST SERPL-CCNC: 26 U/L
BASOPHILS # BLD: <0.03 K/UL (ref 0–0.2)
BASOPHILS NFR BLD: 0 % (ref 0–2)
BILIRUB SERPL-MCNC: 0.6 MG/DL (ref 0.3–1.2)
BUN SERPL-MCNC: 31 MG/DL (ref 8–23)
BUN/CREAT SERPL: 31 (ref 9–20)
CALCIUM SERPL-MCNC: 9.9 MG/DL (ref 8.6–10.4)
CHLORIDE SERPL-SCNC: 104 MMOL/L (ref 98–107)
CHOLEST SERPL-MCNC: 137 MG/DL (ref 0–199)
CHOLESTEROL/HDL RATIO: 3
CO2 SERPL-SCNC: 27 MMOL/L (ref 20–31)
CREAT SERPL-MCNC: 1 MG/DL (ref 0.5–0.9)
EOSINOPHIL # BLD: 0.09 K/UL (ref 0–0.44)
EOSINOPHILS RELATIVE PERCENT: 1 % (ref 1–4)
ERYTHROCYTE [DISTWIDTH] IN BLOOD BY AUTOMATED COUNT: 13.8 % (ref 11.8–14.4)
FOLATE SERPL-MCNC: 12.2 NG/ML (ref 4.8–24.2)
GFR SERPL CREATININE-BSD FRML MDRD: 57 ML/MIN/1.73M2
GLUCOSE SERPL-MCNC: 89 MG/DL (ref 70–99)
HCT VFR BLD AUTO: 35.9 % (ref 36.3–47.1)
HDLC SERPL-MCNC: 47 MG/DL
HGB BLD-MCNC: 12.1 G/DL (ref 11.9–15.1)
IMM GRANULOCYTES # BLD AUTO: <0.03 K/UL (ref 0–0.3)
IMM GRANULOCYTES NFR BLD: 0 %
LDLC SERPL CALC-MCNC: 63 MG/DL (ref 0–100)
LYMPHOCYTES NFR BLD: 2.12 K/UL (ref 1.1–3.7)
LYMPHOCYTES RELATIVE PERCENT: 31 % (ref 24–43)
MCH RBC QN AUTO: 33.6 PG (ref 25.2–33.5)
MCHC RBC AUTO-ENTMCNC: 33.7 G/DL (ref 28.4–34.8)
MCV RBC AUTO: 99.7 FL (ref 82.6–102.9)
MONOCYTES NFR BLD: 1.31 K/UL (ref 0.1–1.2)
MONOCYTES NFR BLD: 19 % (ref 3–12)
NEUTROPHILS NFR BLD: 49 % (ref 36–65)
NEUTS SEG NFR BLD: 3.29 K/UL (ref 1.5–8.1)
NRBC BLD-RTO: 0 PER 100 WBC
PLATELET # BLD AUTO: 174 K/UL (ref 138–453)
PMV BLD AUTO: 10.2 FL (ref 8.1–13.5)
POTASSIUM SERPL-SCNC: 3.9 MMOL/L (ref 3.7–5.3)
PROT SERPL-MCNC: 7.4 G/DL (ref 6.4–8.3)
RBC # BLD AUTO: 3.6 M/UL (ref 3.95–5.11)
SODIUM SERPL-SCNC: 142 MMOL/L (ref 135–144)
T4 FREE SERPL-MCNC: 1.1 NG/DL (ref 0.92–1.68)
TRIGL SERPL-MCNC: 133 MG/DL
TSH SERPL DL<=0.05 MIU/L-ACNC: 6.15 UIU/ML (ref 0.3–5)
VIT B12 SERPL-MCNC: 527 PG/ML (ref 232–1245)
VLDLC SERPL CALC-MCNC: 27 MG/DL
WBC OTHER # BLD: 6.8 K/UL (ref 3.5–11.3)

## 2024-04-16 PROCEDURE — 82306 VITAMIN D 25 HYDROXY: CPT

## 2024-04-16 PROCEDURE — 80053 COMPREHEN METABOLIC PANEL: CPT

## 2024-04-16 PROCEDURE — 1036F TOBACCO NON-USER: CPT | Performed by: STUDENT IN AN ORGANIZED HEALTH CARE EDUCATION/TRAINING PROGRAM

## 2024-04-16 PROCEDURE — 3074F SYST BP LT 130 MM HG: CPT | Performed by: STUDENT IN AN ORGANIZED HEALTH CARE EDUCATION/TRAINING PROGRAM

## 2024-04-16 PROCEDURE — 1123F ACP DISCUSS/DSCN MKR DOCD: CPT | Performed by: STUDENT IN AN ORGANIZED HEALTH CARE EDUCATION/TRAINING PROGRAM

## 2024-04-16 PROCEDURE — 82746 ASSAY OF FOLIC ACID SERUM: CPT

## 2024-04-16 PROCEDURE — G8427 DOCREV CUR MEDS BY ELIG CLIN: HCPCS | Performed by: STUDENT IN AN ORGANIZED HEALTH CARE EDUCATION/TRAINING PROGRAM

## 2024-04-16 PROCEDURE — 1090F PRES/ABSN URINE INCON ASSESS: CPT | Performed by: STUDENT IN AN ORGANIZED HEALTH CARE EDUCATION/TRAINING PROGRAM

## 2024-04-16 PROCEDURE — 82607 VITAMIN B-12: CPT

## 2024-04-16 PROCEDURE — G8417 CALC BMI ABV UP PARAM F/U: HCPCS | Performed by: STUDENT IN AN ORGANIZED HEALTH CARE EDUCATION/TRAINING PROGRAM

## 2024-04-16 PROCEDURE — 84443 ASSAY THYROID STIM HORMONE: CPT

## 2024-04-16 PROCEDURE — G8399 PT W/DXA RESULTS DOCUMENT: HCPCS | Performed by: STUDENT IN AN ORGANIZED HEALTH CARE EDUCATION/TRAINING PROGRAM

## 2024-04-16 PROCEDURE — 80061 LIPID PANEL: CPT

## 2024-04-16 PROCEDURE — 99214 OFFICE O/P EST MOD 30 MIN: CPT | Performed by: STUDENT IN AN ORGANIZED HEALTH CARE EDUCATION/TRAINING PROGRAM

## 2024-04-16 PROCEDURE — G2211 COMPLEX E/M VISIT ADD ON: HCPCS | Performed by: STUDENT IN AN ORGANIZED HEALTH CARE EDUCATION/TRAINING PROGRAM

## 2024-04-16 PROCEDURE — 85025 COMPLETE CBC W/AUTO DIFF WBC: CPT

## 2024-04-16 PROCEDURE — 3078F DIAST BP <80 MM HG: CPT | Performed by: STUDENT IN AN ORGANIZED HEALTH CARE EDUCATION/TRAINING PROGRAM

## 2024-04-16 PROCEDURE — 84439 ASSAY OF FREE THYROXINE: CPT

## 2024-04-16 PROCEDURE — 36415 COLL VENOUS BLD VENIPUNCTURE: CPT

## 2024-04-16 RX ORDER — LISINOPRIL 5 MG/1
5 TABLET ORAL DAILY
Qty: 90 TABLET | Refills: 0 | Status: SHIPPED | OUTPATIENT
Start: 2024-04-16

## 2024-04-16 SDOH — ECONOMIC STABILITY: INCOME INSECURITY: HOW HARD IS IT FOR YOU TO PAY FOR THE VERY BASICS LIKE FOOD, HOUSING, MEDICAL CARE, AND HEATING?: NOT HARD AT ALL

## 2024-04-16 SDOH — ECONOMIC STABILITY: FOOD INSECURITY: WITHIN THE PAST 12 MONTHS, THE FOOD YOU BOUGHT JUST DIDN'T LAST AND YOU DIDN'T HAVE MONEY TO GET MORE.: NEVER TRUE

## 2024-04-16 SDOH — ECONOMIC STABILITY: FOOD INSECURITY: WITHIN THE PAST 12 MONTHS, YOU WORRIED THAT YOUR FOOD WOULD RUN OUT BEFORE YOU GOT MONEY TO BUY MORE.: NEVER TRUE

## 2024-04-16 ASSESSMENT — PATIENT HEALTH QUESTIONNAIRE - PHQ9
SUM OF ALL RESPONSES TO PHQ QUESTIONS 1-9: 0
1. LITTLE INTEREST OR PLEASURE IN DOING THINGS: NOT AT ALL
SUM OF ALL RESPONSES TO PHQ QUESTIONS 1-9: 0
SUM OF ALL RESPONSES TO PHQ9 QUESTIONS 1 & 2: 0
2. FEELING DOWN, DEPRESSED OR HOPELESS: NOT AT ALL

## 2024-04-16 NOTE — PROGRESS NOTES
[facility] PRIMARY CARE  [address] [place] [address] , [address]  [place], [place], [address]    Vanessa Tay is a 79 y.o. female with  has a past medical history of Arthritis, Edema, Hyperlipidemia, Hypertension, Hypothyroidism, Kidney stones, Osteoarthritis, Thyroid disease, and Unspecified transient cerebral ischemia.  Presented to the office today for:  Chief Complaint   Patient presents with    Hypertension    Hypothyroidism       Assessment/Plan   1. Essential hypertension  -     lisinopril (PRINIVIL;ZESTRIL) 5 MG tablet; Take 1 tablet by mouth daily, Disp-90 tablet, R-0Normal  -     CBC with Auto Differential; Future  -     Comprehensive Metabolic Panel; Future  -     TSH With Reflex Ft4; Future  -     Echo (TTE) complete (PRN contrast/bubble/strain/3D); Future  2. Hypothyroidism, unspecified type  -     TSH With Reflex Ft4; Future  3. Vertebral artery dissection (HCC)  4. Vertigo  -     EKG 12 lead; Future  -     Vitamin B12 & Folate; Future  5. BMI 39.0-39.9,adult  -     Vitamin D 25 Hydroxy; Future  6. Lipid screening  -     Lipid Panel; Future  Return in about 4 months (around 8/16/2024) for F/U Med Management.    HTN - at goal, continue w/ lisinopril at 5mg and HCTZ 25mg at the current doses   Hypothyroidism - stable, continue w/ synthroid at 88mcg  Obtain labs, EKG  ECHO ordered for heart murmur     All patient questions answered.  Pt voiced understanding.   Medications Discontinued During This Encounter   Medication Reason    lisinopril (PRINIVIL;ZESTRIL) 5 MG tablet REORDER       Patient received counseling on the following healthy behaviors: nutrition, exercise and medication adherence. I encouraged and discussed lifestyle modifications including diet and exercise (150+ minutes of moderate-high intensity) and the patient was agreeable to making positive/beneficial changes to both to help improve their overall health. Discussed use, benefit, and side effects of prescribed medications.  Barriers to

## 2024-04-17 DIAGNOSIS — E03.9 HYPOTHYROIDISM, UNSPECIFIED TYPE: Primary | ICD-10-CM

## 2024-04-17 RX ORDER — LEVOTHYROXINE SODIUM 0.1 MG/1
100 TABLET ORAL DAILY
Qty: 90 TABLET | Refills: 1 | Status: SHIPPED | OUTPATIENT
Start: 2024-04-17

## 2024-04-17 NOTE — TELEPHONE ENCOUNTER
Pt agrees to dose increase   Pended    Pt agrees to labs prior to next 4 month appt.   Appt made at this time

## 2024-04-19 ENCOUNTER — HOSPITAL ENCOUNTER (OUTPATIENT)
Age: 79
Discharge: HOME | End: 2024-04-19
Payer: MEDICARE

## 2024-04-19 DIAGNOSIS — R42 VERTIGO: ICD-10-CM

## 2024-04-19 LAB
EKG ATRIAL RATE: 79 BPM
EKG P AXIS: 25 DEGREES
EKG P-R INTERVAL: 190 MS
EKG Q-T INTERVAL: 402 MS
EKG QRS DURATION: 142 MS
EKG QTC CALCULATION (BAZETT): 460 MS
EKG R AXIS: -105 DEGREES
EKG T AXIS: 20 DEGREES
EKG VENTRICULAR RATE: 79 BPM

## 2024-04-19 PROCEDURE — 93005 ELECTROCARDIOGRAM TRACING: CPT

## 2024-05-16 PROBLEM — Z13.220 LIPID SCREENING: Status: RESOLVED | Noted: 2024-04-16 | Resolved: 2024-05-16

## 2024-05-23 ENCOUNTER — HOSPITAL ENCOUNTER (OUTPATIENT)
Age: 79
Discharge: HOME | End: 2024-05-25
Attending: STUDENT IN AN ORGANIZED HEALTH CARE EDUCATION/TRAINING PROGRAM
Payer: MEDICARE

## 2024-05-23 VITALS
HEIGHT: 55 IN | DIASTOLIC BLOOD PRESSURE: 68 MMHG | SYSTOLIC BLOOD PRESSURE: 110 MMHG | WEIGHT: 169 LBS | BODY MASS INDEX: 39.11 KG/M2

## 2024-05-23 DIAGNOSIS — I10 ESSENTIAL HYPERTENSION: ICD-10-CM

## 2024-05-23 LAB
ECHO AO ROOT DIAM: 3.4 CM
ECHO AO ROOT INDEX: 2.09 CM/M2
ECHO AR MAX VEL PISA: 3.7 M/S
ECHO AV CUSP MM: 2 CM
ECHO AV MEAN GRADIENT: 5 MMHG
ECHO AV MEAN VELOCITY: 1.1 M/S
ECHO AV PEAK GRADIENT: 10 MMHG
ECHO AV PEAK VELOCITY: 1.6 M/S
ECHO AV REGURGITANT PHT: 815 MS
ECHO AV VELOCITY RATIO: 0.81
ECHO AV VTI: 32.5 CM
ECHO BSA: 1.73 M2
ECHO LA AREA 2C: 16.9 CM2
ECHO LA MINOR AXIS: 5.8 CM
ECHO LA VOL MOD A2C: 41 ML (ref 22–52)
ECHO LA VOLUME INDEX MOD A2C: 25 ML/M2 (ref 16–34)
ECHO LV E' LATERAL VELOCITY: 7 CM/S
ECHO LV EDV A2C: 58 ML
ECHO LV EDV A4C: 62 ML
ECHO LV EDV INDEX A4C: 38 ML/M2
ECHO LV EDV NDEX A2C: 36 ML/M2
ECHO LV EJECTION FRACTION A2C: 60 %
ECHO LV EJECTION FRACTION A4C: 62 %
ECHO LV EJECTION FRACTION BIPLANE: 62 % (ref 55–100)
ECHO LV ESV A2C: 23 ML
ECHO LV ESV A4C: 23 ML
ECHO LV ESV INDEX A2C: 14 ML/M2
ECHO LV ESV INDEX A4C: 14 ML/M2
ECHO LV FRACTIONAL SHORTENING: 27 % (ref 28–44)
ECHO LV INTERNAL DIMENSION DIASTOLE INDEX: 2.52 CM/M2
ECHO LV INTERNAL DIMENSION DIASTOLIC: 4.1 CM (ref 3.9–5.3)
ECHO LV INTERNAL DIMENSION SYSTOLIC INDEX: 1.84 CM/M2
ECHO LV INTERNAL DIMENSION SYSTOLIC: 3 CM
ECHO LV IVSD: 1.6 CM (ref 0.6–0.9)
ECHO LV MASS 2D: 204.9 G (ref 67–162)
ECHO LV MASS INDEX 2D: 125.7 G/M2 (ref 43–95)
ECHO LV POSTERIOR WALL DIASTOLIC: 1.1 CM (ref 0.6–0.9)
ECHO LV RELATIVE WALL THICKNESS RATIO: 0.54
ECHO LVOT AV VTI INDEX: 0.78
ECHO LVOT MEAN GRADIENT: 3 MMHG
ECHO LVOT PEAK GRADIENT: 7 MMHG
ECHO LVOT PEAK VELOCITY: 1.3 M/S
ECHO LVOT VTI: 25.3 CM
ECHO MV A VELOCITY: 1.01 M/S
ECHO MV E DECELERATION TIME (DT): 236 MS
ECHO MV E VELOCITY: 0.8 M/S
ECHO MV E/A RATIO: 0.79
ECHO MV E/E' LATERAL: 11.43
ECHO PV MAX VELOCITY: 1.1 M/S
ECHO PV PEAK GRADIENT: 5 MMHG

## 2024-05-23 PROCEDURE — 93306 TTE W/DOPPLER COMPLETE: CPT | Performed by: FAMILY MEDICINE

## 2024-05-23 PROCEDURE — 93306 TTE W/DOPPLER COMPLETE: CPT

## 2024-07-09 DIAGNOSIS — I10 ESSENTIAL HYPERTENSION: ICD-10-CM

## 2024-07-09 RX ORDER — LISINOPRIL 5 MG/1
5 TABLET ORAL DAILY
Qty: 90 TABLET | Refills: 1 | Status: SHIPPED | OUTPATIENT
Start: 2024-07-09

## 2024-07-09 RX ORDER — HYDROCHLOROTHIAZIDE 25 MG/1
25 TABLET ORAL EVERY OTHER DAY
Qty: 90 TABLET | Refills: 1 | Status: SHIPPED | OUTPATIENT
Start: 2024-07-09

## 2024-08-06 ENCOUNTER — HOSPITAL ENCOUNTER (OUTPATIENT)
Age: 79
Discharge: HOME | End: 2024-08-06
Payer: COMMERCIAL

## 2024-08-06 DIAGNOSIS — E03.9 HYPOTHYROIDISM, UNSPECIFIED TYPE: ICD-10-CM

## 2024-08-06 LAB
ALBUMIN SERPL-MCNC: 4 G/DL (ref 3.5–5.2)
ALBUMIN/GLOB SERPL: 1.5 {RATIO} (ref 1–2.5)
ALP SERPL-CCNC: 71 U/L (ref 35–104)
ALT SERPL-CCNC: 19 U/L (ref 5–33)
ANION GAP SERPL CALCULATED.3IONS-SCNC: 10 MMOL/L (ref 9–17)
AST SERPL-CCNC: 23 U/L
BASOPHILS # BLD: 0.03 K/UL (ref 0–0.2)
BASOPHILS NFR BLD: 0 % (ref 0–2)
BILIRUB SERPL-MCNC: 0.4 MG/DL (ref 0.3–1.2)
BUN SERPL-MCNC: 26 MG/DL (ref 8–23)
BUN/CREAT SERPL: 29 (ref 9–20)
CALCIUM SERPL-MCNC: 9.3 MG/DL (ref 8.6–10.4)
CHLORIDE SERPL-SCNC: 99 MMOL/L (ref 98–107)
CO2 SERPL-SCNC: 29 MMOL/L (ref 20–31)
CREAT SERPL-MCNC: 0.9 MG/DL (ref 0.5–0.9)
EOSINOPHIL # BLD: 0.08 K/UL (ref 0–0.44)
EOSINOPHILS RELATIVE PERCENT: 1 % (ref 1–4)
ERYTHROCYTE [DISTWIDTH] IN BLOOD BY AUTOMATED COUNT: 13.8 % (ref 11.8–14.4)
GFR, ESTIMATED: 64 ML/MIN/1.73M2
GLUCOSE SERPL-MCNC: 107 MG/DL (ref 70–99)
HCT VFR BLD AUTO: 33.9 % (ref 36.3–47.1)
HGB BLD-MCNC: 11.6 G/DL (ref 11.9–15.1)
IMM GRANULOCYTES # BLD AUTO: 0.03 K/UL (ref 0–0.3)
IMM GRANULOCYTES NFR BLD: 0 %
LYMPHOCYTES NFR BLD: 2.05 K/UL (ref 1.1–3.7)
LYMPHOCYTES RELATIVE PERCENT: 30 % (ref 24–43)
MCH RBC QN AUTO: 34.3 PG (ref 25.2–33.5)
MCHC RBC AUTO-ENTMCNC: 34.2 G/DL (ref 28.4–34.8)
MCV RBC AUTO: 100.3 FL (ref 82.6–102.9)
MONOCYTES NFR BLD: 1.48 K/UL (ref 0.1–1.2)
MONOCYTES NFR BLD: 21 % (ref 3–12)
NEUTROPHILS NFR BLD: 48 % (ref 36–65)
NEUTS SEG NFR BLD: 3.28 K/UL (ref 1.5–8.1)
NRBC BLD-RTO: 0 PER 100 WBC
PLATELET # BLD AUTO: 223 K/UL (ref 138–453)
PMV BLD AUTO: 11.5 FL (ref 8.1–13.5)
POTASSIUM SERPL-SCNC: 3.1 MMOL/L (ref 3.7–5.3)
PROT SERPL-MCNC: 6.7 G/DL (ref 6.4–8.3)
RBC # BLD AUTO: 3.38 M/UL (ref 3.95–5.11)
SODIUM SERPL-SCNC: 138 MMOL/L (ref 135–144)
TSH SERPL DL<=0.05 MIU/L-ACNC: 3.49 UIU/ML (ref 0.3–5)
WBC OTHER # BLD: 7 K/UL (ref 3.5–11.3)

## 2024-08-06 PROCEDURE — 84436 ASSAY OF TOTAL THYROXINE: CPT

## 2024-08-06 PROCEDURE — 80053 COMPREHEN METABOLIC PANEL: CPT

## 2024-08-06 PROCEDURE — 84443 ASSAY THYROID STIM HORMONE: CPT

## 2024-08-06 PROCEDURE — 36415 COLL VENOUS BLD VENIPUNCTURE: CPT

## 2024-08-06 PROCEDURE — 85025 COMPLETE CBC W/AUTO DIFF WBC: CPT

## 2024-08-07 LAB — T4 SERPL-MCNC: 6.7 UG/DL (ref 4.5–11.7)

## 2024-08-12 ENCOUNTER — OFFICE VISIT (OUTPATIENT)
Dept: PRIMARY CARE CLINIC | Age: 79
End: 2024-08-12
Payer: MEDICARE

## 2024-08-12 VITALS
DIASTOLIC BLOOD PRESSURE: 78 MMHG | HEIGHT: 55 IN | WEIGHT: 164 LBS | SYSTOLIC BLOOD PRESSURE: 126 MMHG | OXYGEN SATURATION: 94 % | HEART RATE: 74 BPM | BODY MASS INDEX: 37.95 KG/M2

## 2024-08-12 DIAGNOSIS — I10 ESSENTIAL HYPERTENSION: Primary | ICD-10-CM

## 2024-08-12 DIAGNOSIS — R60.0 LEG EDEMA: ICD-10-CM

## 2024-08-12 DIAGNOSIS — E03.9 HYPOTHYROIDISM, UNSPECIFIED TYPE: ICD-10-CM

## 2024-08-12 PROCEDURE — G2211 COMPLEX E/M VISIT ADD ON: HCPCS | Performed by: STUDENT IN AN ORGANIZED HEALTH CARE EDUCATION/TRAINING PROGRAM

## 2024-08-12 PROCEDURE — 1090F PRES/ABSN URINE INCON ASSESS: CPT | Performed by: STUDENT IN AN ORGANIZED HEALTH CARE EDUCATION/TRAINING PROGRAM

## 2024-08-12 PROCEDURE — G8427 DOCREV CUR MEDS BY ELIG CLIN: HCPCS | Performed by: STUDENT IN AN ORGANIZED HEALTH CARE EDUCATION/TRAINING PROGRAM

## 2024-08-12 PROCEDURE — 99214 OFFICE O/P EST MOD 30 MIN: CPT | Performed by: STUDENT IN AN ORGANIZED HEALTH CARE EDUCATION/TRAINING PROGRAM

## 2024-08-12 PROCEDURE — 1123F ACP DISCUSS/DSCN MKR DOCD: CPT | Performed by: STUDENT IN AN ORGANIZED HEALTH CARE EDUCATION/TRAINING PROGRAM

## 2024-08-12 PROCEDURE — 1036F TOBACCO NON-USER: CPT | Performed by: STUDENT IN AN ORGANIZED HEALTH CARE EDUCATION/TRAINING PROGRAM

## 2024-08-12 PROCEDURE — 3078F DIAST BP <80 MM HG: CPT | Performed by: STUDENT IN AN ORGANIZED HEALTH CARE EDUCATION/TRAINING PROGRAM

## 2024-08-12 PROCEDURE — G8399 PT W/DXA RESULTS DOCUMENT: HCPCS | Performed by: STUDENT IN AN ORGANIZED HEALTH CARE EDUCATION/TRAINING PROGRAM

## 2024-08-12 PROCEDURE — G8417 CALC BMI ABV UP PARAM F/U: HCPCS | Performed by: STUDENT IN AN ORGANIZED HEALTH CARE EDUCATION/TRAINING PROGRAM

## 2024-08-12 PROCEDURE — 3074F SYST BP LT 130 MM HG: CPT | Performed by: STUDENT IN AN ORGANIZED HEALTH CARE EDUCATION/TRAINING PROGRAM

## 2024-08-12 RX ORDER — LISINOPRIL 2.5 MG/1
2.5 TABLET ORAL DAILY
COMMUNITY

## 2024-08-12 NOTE — PROGRESS NOTES
[facility] PRIMARY CARE  [address] [place] [address] , [address]  [place], [place], [address]    Vanessa Tay is a 79 y.o. female with  has a past medical history of Arthritis, Edema, Hyperlipidemia, Hypertension, Hypothyroidism, Kidney stones, Osteoarthritis, Thyroid disease, and Unspecified transient cerebral ischemia.  Presented to the office today for:  Chief Complaint   Patient presents with    Hypothyroidism    Hypertension       Assessment/Plan   1. Essential hypertension  2. Hypothyroidism, unspecified type  3. Leg edema  Return in about 4 months (around 12/12/2024) for F/U Med Management.    HTN - at goal, continue w/ lisinopril at 5mg and HCTZ 25mg at the current doses   Lasix 20mg PO PRN, HCTZ to be continued, Lisinopril adjusted to 2.5mg  Hypothyroidism - stable, continue w/ synthroid at 88mcg  Encourage PO intake of potassium      All patient questions answered.  Pt voiced understanding.   Medications Discontinued During This Encounter   Medication Reason    acetaminophen (TYLENOL) 500 MG tablet LIST CLEANUP    levothyroxine (SYNTHROID) 88 MCG tablet DOSE ADJUSTMENT    lisinopril (PRINIVIL;ZESTRIL) 5 MG tablet LIST CLEANUP     Patient received counseling on the following healthy behaviors: nutrition, exercise and medication adherence. I encouraged and discussed lifestyle modifications including diet and exercise (150+ minutes of moderate-high intensity) and the patient was agreeable to making positive/beneficial changes to both to help improve their overall health. Discussed use, benefit, and side effects of prescribed medications.  Barriers to medication compliance addressed. Patient given educational materials: see patient instructions.     HM - HM items completed today as per orders. Outstanding HM items though not limited to immunizations were discussed with the patient today, including risks, benefits and alternatives. The patient will discuss these during the next appointment per their

## 2024-10-07 DIAGNOSIS — E03.9 HYPOTHYROIDISM, UNSPECIFIED TYPE: ICD-10-CM

## 2024-10-07 RX ORDER — LEVOTHYROXINE SODIUM 100 UG/1
100 TABLET ORAL DAILY
Qty: 90 TABLET | Refills: 1 | Status: SHIPPED | OUTPATIENT
Start: 2024-10-07

## 2024-12-17 ENCOUNTER — HOSPITAL ENCOUNTER (OUTPATIENT)
Age: 79
Discharge: HOME | End: 2024-12-17
Payer: COMMERCIAL

## 2024-12-17 ENCOUNTER — OFFICE VISIT (OUTPATIENT)
Dept: PRIMARY CARE CLINIC | Age: 79
End: 2024-12-17
Payer: MEDICARE

## 2024-12-17 VITALS
OXYGEN SATURATION: 98 % | BODY MASS INDEX: 39.81 KG/M2 | WEIGHT: 172 LBS | DIASTOLIC BLOOD PRESSURE: 68 MMHG | HEIGHT: 55 IN | SYSTOLIC BLOOD PRESSURE: 110 MMHG | HEART RATE: 76 BPM

## 2024-12-17 DIAGNOSIS — I10 PRIMARY HYPERTENSION: ICD-10-CM

## 2024-12-17 DIAGNOSIS — E03.9 HYPOTHYROIDISM, UNSPECIFIED TYPE: ICD-10-CM

## 2024-12-17 DIAGNOSIS — Z71.89 ACP (ADVANCE CARE PLANNING): ICD-10-CM

## 2024-12-17 DIAGNOSIS — E55.9 HYPOVITAMINOSIS D: ICD-10-CM

## 2024-12-17 DIAGNOSIS — Z00.00 MEDICARE ANNUAL WELLNESS VISIT, SUBSEQUENT: Primary | ICD-10-CM

## 2024-12-17 DIAGNOSIS — R60.0 LEG EDEMA: ICD-10-CM

## 2024-12-17 LAB
25(OH)D3 SERPL-MCNC: 32.3 NG/ML (ref 30–100)
ALBUMIN SERPL-MCNC: 4.1 G/DL (ref 3.5–5.2)
ALBUMIN/GLOB SERPL: 1.6 {RATIO} (ref 1–2.5)
ALP SERPL-CCNC: 76 U/L (ref 35–104)
ALT SERPL-CCNC: 19 U/L (ref 10–35)
ANION GAP SERPL CALCULATED.3IONS-SCNC: 10 MMOL/L (ref 9–16)
AST SERPL-CCNC: 29 U/L (ref 10–35)
BASOPHILS # BLD: 0.03 K/UL (ref 0–0.2)
BASOPHILS NFR BLD: 1 % (ref 0–2)
BILIRUB SERPL-MCNC: 0.4 MG/DL (ref 0–1.2)
BUN SERPL-MCNC: 26 MG/DL (ref 8–23)
BUN/CREAT SERPL: 24 (ref 9–20)
CALCIUM SERPL-MCNC: 9.8 MG/DL (ref 8.6–10.4)
CHLORIDE SERPL-SCNC: 105 MMOL/L (ref 98–107)
CHOLEST SERPL-MCNC: 157 MG/DL (ref 0–199)
CHOLESTEROL/HDL RATIO: 3.1
CO2 SERPL-SCNC: 28 MMOL/L (ref 20–31)
CREAT SERPL-MCNC: 1.1 MG/DL (ref 0.5–0.9)
EOSINOPHIL # BLD: 0.09 K/UL (ref 0–0.44)
EOSINOPHILS RELATIVE PERCENT: 2 % (ref 1–4)
ERYTHROCYTE [DISTWIDTH] IN BLOOD BY AUTOMATED COUNT: 14.2 % (ref 11.8–14.4)
FOLATE SERPL-MCNC: 7.9 NG/ML (ref 4.8–24.2)
GFR, ESTIMATED: 50 ML/MIN/1.73M2
GLUCOSE SERPL-MCNC: 86 MG/DL (ref 74–99)
HCT VFR BLD AUTO: 37.2 % (ref 36.3–47.1)
HDLC SERPL-MCNC: 50 MG/DL
HGB BLD-MCNC: 11.9 G/DL (ref 11.9–15.1)
IMM GRANULOCYTES # BLD AUTO: 0.03 K/UL (ref 0–0.3)
IMM GRANULOCYTES NFR BLD: 1 %
LDLC SERPL CALC-MCNC: 76 MG/DL (ref 0–100)
LYMPHOCYTES NFR BLD: 1.73 K/UL (ref 1.1–3.7)
LYMPHOCYTES RELATIVE PERCENT: 29 % (ref 24–43)
MCH RBC QN AUTO: 33 PG (ref 25.2–33.5)
MCHC RBC AUTO-ENTMCNC: 32 G/DL (ref 28.4–34.8)
MCV RBC AUTO: 103 FL (ref 82.6–102.9)
MONOCYTES NFR BLD: 1.14 K/UL (ref 0.1–1.2)
MONOCYTES NFR BLD: 19 % (ref 3–12)
NEUTROPHILS NFR BLD: 48 % (ref 36–65)
NEUTS SEG NFR BLD: 2.93 K/UL (ref 1.5–8.1)
NRBC BLD-RTO: 0 PER 100 WBC
PLATELET # BLD AUTO: 171 K/UL (ref 138–453)
PMV BLD AUTO: 10 FL (ref 8.1–13.5)
POTASSIUM SERPL-SCNC: 3.8 MMOL/L (ref 3.7–5.3)
PROT SERPL-MCNC: 6.7 G/DL (ref 6.6–8.7)
RBC # BLD AUTO: 3.61 M/UL (ref 3.95–5.11)
SODIUM SERPL-SCNC: 143 MMOL/L (ref 136–145)
T4 FREE SERPL-MCNC: 1.2 NG/DL (ref 0.92–1.68)
TRIGL SERPL-MCNC: 156 MG/DL
TSH SERPL DL<=0.05 MIU/L-ACNC: 5.05 UIU/ML (ref 0.27–4.2)
VIT B12 SERPL-MCNC: 566 PG/ML (ref 232–1245)
VLDLC SERPL CALC-MCNC: 31 MG/DL (ref 1–30)
WBC OTHER # BLD: 6 K/UL (ref 3.5–11.3)

## 2024-12-17 PROCEDURE — 80053 COMPREHEN METABOLIC PANEL: CPT

## 2024-12-17 PROCEDURE — 99211 OFF/OP EST MAY X REQ PHY/QHP: CPT | Performed by: STUDENT IN AN ORGANIZED HEALTH CARE EDUCATION/TRAINING PROGRAM

## 2024-12-17 PROCEDURE — 82746 ASSAY OF FOLIC ACID SERUM: CPT

## 2024-12-17 PROCEDURE — 85025 COMPLETE CBC W/AUTO DIFF WBC: CPT

## 2024-12-17 PROCEDURE — 82306 VITAMIN D 25 HYDROXY: CPT

## 2024-12-17 PROCEDURE — 36415 COLL VENOUS BLD VENIPUNCTURE: CPT

## 2024-12-17 PROCEDURE — 80061 LIPID PANEL: CPT

## 2024-12-17 PROCEDURE — 82607 VITAMIN B-12: CPT

## 2024-12-17 PROCEDURE — 84439 ASSAY OF FREE THYROXINE: CPT

## 2024-12-17 PROCEDURE — 84443 ASSAY THYROID STIM HORMONE: CPT

## 2024-12-17 RX ORDER — FUROSEMIDE 20 MG/1
20 TABLET ORAL DAILY
Qty: 90 TABLET | Refills: 1 | Status: SHIPPED | OUTPATIENT
Start: 2024-12-17

## 2024-12-17 RX ORDER — LISINOPRIL 2.5 MG/1
2.5 TABLET ORAL DAILY
Qty: 90 TABLET | Refills: 1 | Status: SHIPPED | OUTPATIENT
Start: 2024-12-17

## 2024-12-17 ASSESSMENT — PATIENT HEALTH QUESTIONNAIRE - PHQ9
2. FEELING DOWN, DEPRESSED OR HOPELESS: NOT AT ALL
SUM OF ALL RESPONSES TO PHQ QUESTIONS 1-9: 0
SUM OF ALL RESPONSES TO PHQ9 QUESTIONS 1 & 2: 0
SUM OF ALL RESPONSES TO PHQ QUESTIONS 1-9: 0
1. LITTLE INTEREST OR PLEASURE IN DOING THINGS: NOT AT ALL

## 2024-12-17 ASSESSMENT — LIFESTYLE VARIABLES: HOW OFTEN DO YOU HAVE A DRINK CONTAINING ALCOHOL: NEVER

## 2024-12-17 NOTE — PATIENT INSTRUCTIONS
or download the FREE \"Exercise & Physical Activity: Your Everyday Guide\" from [employer]. Call 1-844.561.6359 or search [employer] online.  You need 3241-9391 mg of calcium and 1745-8015 IU of vitamin D per day. It is possible to meet your calcium requirement with diet alone, but a vitamin D supplement is usually necessary to meet this goal.  When exposed to the sun, use a sunscreen that protects against both UVA and UVB radiation with an SPF of 30 or greater. Reapply every 2 to 3 hours or after sweating, drying off with a towel, or swimming.  Always wear a seat belt when traveling in a car. Always wear a helmet when riding a bicycle or motorcycle.  Advance Care Planning     Advance Care Planning opens a door to talk about and write down your wishes before a sudden accident or illness.  Make your goals, values, and preferences known.     This puts you in the ’s seat and helps others know what matters most to you so they won’t have to guess.      Where can you learn more?    Go to [email or web address]-[email or web address]-care-planning   to learn how to:    Name someone you trust to make healthcare decisions for you, only if you can’t. (Healthcare Power of )    Document your wishes for care if you were seriously ill and not expected to recover or are approaching end of life. (Advance Directive or Living Will)    The same page can be found using the QR code below.

## 2024-12-17 NOTE — PROGRESS NOTES
[facility] PRIMARY CARE  [address] , [address]  [place], [place], [address]      Medicare Annual Wellness Visit / Progress note    Vanessa Tay is here for Medicare AWV, Hypertension, and Hypothyroidism    Assessment & Plan   Medicare annual wellness visit, subsequent  Primary hypertension [I10]  -     lisinopril (PRINIVIL;ZESTRIL) 2.5 MG tablet; Take 1 tablet by mouth daily, Disp-90 tablet, R-1Normal  -     Lipid Panel; Future  -     TSH With Reflex Ft4; Future  -     CBC with Auto Differential; Future  -     Comprehensive Metabolic Panel; Future  -     Vitamin D 25 Hydroxy; Future  -     Vitamin B12 & Folate; Future  Leg edema  -     furosemide (LASIX) 20 MG tablet; Take 1 tablet by mouth daily, Disp-90 tablet, R-1Normal  Hypothyroidism, unspecified type  -     T4, Free; Future  BMI 39.0-39.9,adult  ACP (advance care planning)  -     [place] Advance Care Planning (16-30 minutes) [11317]  Hypovitaminosis D  -     Vitamin D 25 Hydroxy; Future  -     Vitamin B12 & Folate; Future    Recommendations for Preventive Services Due: see orders and patient instructions/AVS.  Recommended screening schedule for the next 5-10 years is provided to the patient in written form: see Patient Instructions/AVS.    Plan for shave biopsy vs. Cryotherapy as discussed  HTN - at goal, continue w/ lisinopril at 5mg and HCTZ 25mg at the current doses   Lasix 20mg PO PRN, HCTZ to be continued, Lisinopril adjusted to 2.5mg  Hypothyroidism - stable, continue w/ synthroid at 88mcg       Advance Care Planning   Discussed the patient’s choices for care and treatment preferences in case of a health event that adversely affects decision-making abilities or is life-limiting. Recommended the patient document care preferences in state-specific advance directives. Also reviewed the process of designating a trusted capable adult as an Agent (or Health Care Power of ) to make health care decisions for the patient if the patient

## 2024-12-18 RX ORDER — LEVOTHYROXINE SODIUM 125 UG/1
125 TABLET ORAL DAILY
Qty: 90 TABLET | Refills: 1 | Status: SHIPPED | OUTPATIENT
Start: 2024-12-18

## 2024-12-18 NOTE — TELEPHONE ENCOUNTER
----- Message from Dr. Fabio Rosas MD sent at 12/18/2024 12:32 PM EST -----  Please notify the patient that their lab results are reviewed  Cmp is wnl  Potassium is normal  Cbc is stable  Lipid panel is stable, slightly higher than prior which we can monitor  vitD wnl  Vitamins wnl    Tsh is slightly abnormal  -we can monitor vs increase the dose - which we go up to 125mcg, pend if agreeable thank you.    Electronically signed by Fabio Rosas MD on 12/18/2024 at 12:32 PM

## 2025-01-16 PROBLEM — Z00.00 MEDICARE ANNUAL WELLNESS VISIT, SUBSEQUENT: Status: RESOLVED | Noted: 2024-12-17 | Resolved: 2025-01-16

## 2025-01-29 ENCOUNTER — HOSPITAL ENCOUNTER (OUTPATIENT)
Age: 80
Setting detail: SPECIMEN
Discharge: HOME | End: 2025-01-29

## 2025-01-29 ENCOUNTER — PROCEDURE VISIT (OUTPATIENT)
Dept: PRIMARY CARE CLINIC | Age: 80
End: 2025-01-29
Payer: MEDICARE

## 2025-01-29 VITALS
WEIGHT: 176 LBS | SYSTOLIC BLOOD PRESSURE: 118 MMHG | BODY MASS INDEX: 40.73 KG/M2 | HEART RATE: 78 BPM | DIASTOLIC BLOOD PRESSURE: 64 MMHG | HEIGHT: 55 IN | RESPIRATION RATE: 16 BRPM

## 2025-01-29 DIAGNOSIS — D49.2 ABNORMAL SKIN GROWTH: Primary | ICD-10-CM

## 2025-01-29 DIAGNOSIS — D49.2 ABNORMAL SKIN GROWTH: ICD-10-CM

## 2025-01-29 PROCEDURE — 11103 TANGNTL BX SKIN EA SEP/ADDL: CPT | Performed by: STUDENT IN AN ORGANIZED HEALTH CARE EDUCATION/TRAINING PROGRAM

## 2025-01-29 PROCEDURE — 11102 TANGNTL BX SKIN SINGLE LES: CPT | Performed by: STUDENT IN AN ORGANIZED HEALTH CARE EDUCATION/TRAINING PROGRAM

## 2025-01-29 ASSESSMENT — PATIENT HEALTH QUESTIONNAIRE - PHQ9
SUM OF ALL RESPONSES TO PHQ QUESTIONS 1-9: 0
2. FEELING DOWN, DEPRESSED OR HOPELESS: NOT AT ALL
SUM OF ALL RESPONSES TO PHQ QUESTIONS 1-9: 0
1. LITTLE INTEREST OR PLEASURE IN DOING THINGS: NOT AT ALL
SUM OF ALL RESPONSES TO PHQ QUESTIONS 1-9: 0
SUM OF ALL RESPONSES TO PHQ9 QUESTIONS 1 & 2: 0
SUM OF ALL RESPONSES TO PHQ QUESTIONS 1-9: 0

## 2025-01-29 NOTE — PROGRESS NOTES
[facility] CARE  [address] [place] [address] , [address]  [place], [place], [address]    Shave Biopsy Procedure Note    Pre-operative Diagnosis: abnormal skin growth    Post-operative Diagnosis: same    Locations:Left upper chest wall     Indications: abnormal skin growth    Anesthesia: Lidocaine 1% with epinephrine without added sodium bicarbonate    Procedure Details   History of allergy to iodine: no    Patient informed of the risks (including bleeding and infection) and benefits of the   procedure and Verbal informed consent obtained.    The lesion and surrounding area were given a sterile prep using betadine and draped in the usual sterile fashion. A dermablade was used to shave an area of skin approximately [id number] , 5 lesions.  Right med clavicle, lateral mid clavicle, upper lateral neck (left) upper-mid-lower. Hemostasis achieved aluminum.. Antibiotic ointment and a sterile dressing applied.  The specimen was sent for pathologic examination. The patient tolerated the procedure well.    EBL: 5 ml      Condition:  Stable    Complications:  none.    Plan:  1. Instructed to keep the wound dry and covered for 24-48h and clean thereafter.  2. Warning signs of infection were reviewed.    3. Recommended that the patient use OTC analgesics as needed for pain.   4. Return as needed.    Electronically signed by Fabio Rosas MD on 1/29/2025 at 5:04 PM

## 2025-02-03 LAB — SURGICAL PATHOLOGY REPORT: NORMAL

## 2025-05-28 ENCOUNTER — OFFICE VISIT (OUTPATIENT)
Dept: PRIMARY CARE CLINIC | Age: 80
End: 2025-05-28
Payer: COMMERCIAL

## 2025-05-28 VITALS
OXYGEN SATURATION: 96 % | BODY MASS INDEX: 39.74 KG/M2 | RESPIRATION RATE: 16 BRPM | SYSTOLIC BLOOD PRESSURE: 116 MMHG | WEIGHT: 171 LBS | HEART RATE: 75 BPM | DIASTOLIC BLOOD PRESSURE: 72 MMHG

## 2025-05-28 DIAGNOSIS — I10 PRIMARY HYPERTENSION: Primary | ICD-10-CM

## 2025-05-28 DIAGNOSIS — E55.9 HYPOVITAMINOSIS D: ICD-10-CM

## 2025-05-28 DIAGNOSIS — Z13.220 LIPID SCREENING: ICD-10-CM

## 2025-05-28 DIAGNOSIS — E03.9 HYPOTHYROIDISM, UNSPECIFIED TYPE: ICD-10-CM

## 2025-05-28 PROCEDURE — 1123F ACP DISCUSS/DSCN MKR DOCD: CPT | Performed by: STUDENT IN AN ORGANIZED HEALTH CARE EDUCATION/TRAINING PROGRAM

## 2025-05-28 PROCEDURE — 3078F DIAST BP <80 MM HG: CPT | Performed by: STUDENT IN AN ORGANIZED HEALTH CARE EDUCATION/TRAINING PROGRAM

## 2025-05-28 PROCEDURE — 1036F TOBACCO NON-USER: CPT | Performed by: STUDENT IN AN ORGANIZED HEALTH CARE EDUCATION/TRAINING PROGRAM

## 2025-05-28 PROCEDURE — 1159F MED LIST DOCD IN RCRD: CPT | Performed by: STUDENT IN AN ORGANIZED HEALTH CARE EDUCATION/TRAINING PROGRAM

## 2025-05-28 PROCEDURE — 3074F SYST BP LT 130 MM HG: CPT | Performed by: STUDENT IN AN ORGANIZED HEALTH CARE EDUCATION/TRAINING PROGRAM

## 2025-05-28 PROCEDURE — 1090F PRES/ABSN URINE INCON ASSESS: CPT | Performed by: STUDENT IN AN ORGANIZED HEALTH CARE EDUCATION/TRAINING PROGRAM

## 2025-05-28 PROCEDURE — G8399 PT W/DXA RESULTS DOCUMENT: HCPCS | Performed by: STUDENT IN AN ORGANIZED HEALTH CARE EDUCATION/TRAINING PROGRAM

## 2025-05-28 PROCEDURE — G8427 DOCREV CUR MEDS BY ELIG CLIN: HCPCS | Performed by: STUDENT IN AN ORGANIZED HEALTH CARE EDUCATION/TRAINING PROGRAM

## 2025-05-28 PROCEDURE — 1160F RVW MEDS BY RX/DR IN RCRD: CPT | Performed by: STUDENT IN AN ORGANIZED HEALTH CARE EDUCATION/TRAINING PROGRAM

## 2025-05-28 PROCEDURE — G8417 CALC BMI ABV UP PARAM F/U: HCPCS | Performed by: STUDENT IN AN ORGANIZED HEALTH CARE EDUCATION/TRAINING PROGRAM

## 2025-05-28 PROCEDURE — G2211 COMPLEX E/M VISIT ADD ON: HCPCS | Performed by: STUDENT IN AN ORGANIZED HEALTH CARE EDUCATION/TRAINING PROGRAM

## 2025-05-28 PROCEDURE — 99214 OFFICE O/P EST MOD 30 MIN: CPT | Performed by: STUDENT IN AN ORGANIZED HEALTH CARE EDUCATION/TRAINING PROGRAM

## 2025-05-28 SDOH — ECONOMIC STABILITY: FOOD INSECURITY: WITHIN THE PAST 12 MONTHS, YOU WORRIED THAT YOUR FOOD WOULD RUN OUT BEFORE YOU GOT MONEY TO BUY MORE.: NEVER TRUE

## 2025-05-28 SDOH — ECONOMIC STABILITY: FOOD INSECURITY: WITHIN THE PAST 12 MONTHS, THE FOOD YOU BOUGHT JUST DIDN'T LAST AND YOU DIDN'T HAVE MONEY TO GET MORE.: NEVER TRUE

## 2025-05-28 NOTE — PROGRESS NOTES
[facility] PRIMARY CARE  [address] [place] [address] , [address]  [place], [place], [address]    Vanessa Tay is a 80 y.o. female with  has a past medical history of Arthritis, Edema, Hyperlipidemia, Hypertension, Hypothyroidism, Kidney stones, Osteoarthritis, Thyroid disease, and Unspecified transient cerebral ischemia.  Presented to the office today for:  Chief Complaint   Patient presents with    Hypertension    Hypothyroidism       Assessment/Plan   1. Primary hypertension [I10]  2. Hypothyroidism, unspecified type  -     TSH reflex to FT4; Future  -     CBC with Auto Differential; Future  -     Comprehensive Metabolic Panel; Future  3. Hypovitaminosis D  4. Lipid screening  -     Lipid Panel; Future  Return in about 4 months (around 9/28/2025) for F/U Med Management.  Assessment & Plan  HTN - at goal, continue w/ lisinopril at 5mg and HCTZ 25mg at the current doses   Lasix 20mg PO PRN, HCTZ to be continued, Lisinopril adjusted to 2.5mg  Hypothyroidism - stable, continue w/ synthroid at 88mcg  Repeat labs, hyperlipidemia - obtain labs     All patient questions answered.  Pt voiced understanding.   There are no discontinued medications.    Patient received counseling on the following healthy behaviors: nutrition, exercise and medication adherence. I encouraged and discussed lifestyle modifications including diet and exercise (150+ minutes of moderate-high intensity) and the patient was agreeable to making positive/beneficial changes to both to help improve their overall health. Discussed use, benefit, and side effects of prescribed medications.  Barriers to medication compliance addressed. Patient given educational materials: see patient instructions.     HM - HM items completed today as per orders. Outstanding HM items though not limited to immunizations were discussed with the patient today, including risks, benefits and alternatives. The patient will discuss these during the next appointment per their

## 2025-05-30 ENCOUNTER — HOSPITAL ENCOUNTER (OUTPATIENT)
Age: 80
Discharge: HOME | End: 2025-05-30
Payer: COMMERCIAL

## 2025-05-30 ENCOUNTER — RESULTS FOLLOW-UP (OUTPATIENT)
Dept: INTERNAL MEDICINE CLINIC | Age: 80
End: 2025-05-30

## 2025-05-30 DIAGNOSIS — Z13.220 LIPID SCREENING: ICD-10-CM

## 2025-05-30 DIAGNOSIS — G47.10 HYPERSOMNIA, UNSPECIFIED: ICD-10-CM

## 2025-05-30 DIAGNOSIS — E03.9 HYPOTHYROIDISM, UNSPECIFIED TYPE: ICD-10-CM

## 2025-05-30 DIAGNOSIS — R53.83 OTHER FATIGUE: ICD-10-CM

## 2025-05-30 DIAGNOSIS — R42 VERTIGO: ICD-10-CM

## 2025-05-30 DIAGNOSIS — I10 ESSENTIAL HYPERTENSION: Primary | Chronic | ICD-10-CM

## 2025-05-30 DIAGNOSIS — E66.01 MORBIDLY OBESE (HCC): ICD-10-CM

## 2025-05-30 LAB
ALBUMIN SERPL-MCNC: 4.1 G/DL (ref 3.5–5.2)
ALBUMIN/GLOB SERPL: 1.4 {RATIO} (ref 1–2.5)
ALP SERPL-CCNC: 82 U/L (ref 35–104)
ALT SERPL-CCNC: 21 U/L (ref 10–35)
ANION GAP SERPL CALCULATED.3IONS-SCNC: 12 MMOL/L (ref 9–16)
AST SERPL-CCNC: 31 U/L (ref 10–35)
BASOPHILS # BLD: <0.03 K/UL (ref 0–0.2)
BASOPHILS NFR BLD: 0 % (ref 0–2)
BILIRUB SERPL-MCNC: 0.5 MG/DL (ref 0–1.2)
BUN SERPL-MCNC: 22 MG/DL (ref 8–23)
BUN/CREAT SERPL: 28 (ref 9–20)
CALCIUM SERPL-MCNC: 9.5 MG/DL (ref 8.6–10.4)
CHLORIDE SERPL-SCNC: 105 MMOL/L (ref 98–107)
CHOLEST SERPL-MCNC: 140 MG/DL (ref 0–199)
CHOLESTEROL/HDL RATIO: 2.8
CO2 SERPL-SCNC: 26 MMOL/L (ref 20–31)
CREAT SERPL-MCNC: 0.8 MG/DL (ref 0.5–0.9)
EOSINOPHIL # BLD: 0.11 K/UL (ref 0–0.44)
EOSINOPHILS RELATIVE PERCENT: 2 % (ref 1–4)
ERYTHROCYTE [DISTWIDTH] IN BLOOD BY AUTOMATED COUNT: 14.6 % (ref 11.8–14.4)
GFR, ESTIMATED: 69 ML/MIN/1.73M2
GLUCOSE SERPL-MCNC: 96 MG/DL (ref 74–99)
HCT VFR BLD AUTO: 36.6 % (ref 36.3–47.1)
HDLC SERPL-MCNC: 50 MG/DL
HGB BLD-MCNC: 11.8 G/DL (ref 11.9–15.1)
IMM GRANULOCYTES # BLD AUTO: 0.04 K/UL (ref 0–0.3)
IMM GRANULOCYTES NFR BLD: 1 %
LDLC SERPL CALC-MCNC: 73 MG/DL (ref 0–100)
LYMPHOCYTES NFR BLD: 1.83 K/UL (ref 1.1–3.7)
LYMPHOCYTES RELATIVE PERCENT: 25 % (ref 24–43)
MCH RBC QN AUTO: 33.1 PG (ref 25.2–33.5)
MCHC RBC AUTO-ENTMCNC: 32.2 G/DL (ref 28.4–34.8)
MCV RBC AUTO: 102.8 FL (ref 82.6–102.9)
MONOCYTES NFR BLD: 1.4 K/UL (ref 0.1–1.2)
MONOCYTES NFR BLD: 19 % (ref 3–12)
NEUTROPHILS NFR BLD: 53 % (ref 36–65)
NEUTS SEG NFR BLD: 3.92 K/UL (ref 1.5–8.1)
NRBC BLD-RTO: 0 PER 100 WBC
PLATELET # BLD AUTO: 167 K/UL (ref 138–453)
PMV BLD AUTO: 10.7 FL (ref 8.1–13.5)
POTASSIUM SERPL-SCNC: 4 MMOL/L (ref 3.7–5.3)
PROT SERPL-MCNC: 6.9 G/DL (ref 6.6–8.7)
RBC # BLD AUTO: 3.56 M/UL (ref 3.95–5.11)
SODIUM SERPL-SCNC: 143 MMOL/L (ref 136–145)
TRIGL SERPL-MCNC: 87 MG/DL
TSH SERPL DL<=0.05 MIU/L-ACNC: 0.44 UIU/ML (ref 0.27–4.2)
VLDLC SERPL CALC-MCNC: 17 MG/DL (ref 1–30)
WBC OTHER # BLD: 7.3 K/UL (ref 3.5–11.3)

## 2025-05-30 PROCEDURE — 84443 ASSAY THYROID STIM HORMONE: CPT

## 2025-05-30 PROCEDURE — 85025 COMPLETE CBC W/AUTO DIFF WBC: CPT

## 2025-05-30 PROCEDURE — 80053 COMPREHEN METABOLIC PANEL: CPT

## 2025-05-30 PROCEDURE — 36415 COLL VENOUS BLD VENIPUNCTURE: CPT

## 2025-05-30 PROCEDURE — 80061 LIPID PANEL: CPT

## 2025-06-11 DIAGNOSIS — I10 PRIMARY HYPERTENSION: ICD-10-CM

## 2025-06-11 RX ORDER — LEVOTHYROXINE SODIUM 125 UG/1
125 TABLET ORAL DAILY
Qty: 90 TABLET | Refills: 1 | Status: SHIPPED | OUTPATIENT
Start: 2025-06-11

## 2025-06-11 RX ORDER — HYDROCHLOROTHIAZIDE 25 MG/1
25 TABLET ORAL EVERY OTHER DAY
Qty: 90 TABLET | Refills: 1 | Status: SHIPPED | OUTPATIENT
Start: 2025-06-11

## 2025-06-11 RX ORDER — LISINOPRIL 2.5 MG/1
2.5 TABLET ORAL DAILY
Qty: 90 TABLET | Refills: 1 | Status: SHIPPED | OUTPATIENT
Start: 2025-06-11

## 2025-06-17 ENCOUNTER — HOSPITAL ENCOUNTER (OUTPATIENT)
Dept: SLEEP CENTER | Age: 80
Discharge: HOME | End: 2025-06-17
Attending: STUDENT IN AN ORGANIZED HEALTH CARE EDUCATION/TRAINING PROGRAM
Payer: COMMERCIAL

## 2025-06-17 DIAGNOSIS — R42 VERTIGO: ICD-10-CM

## 2025-06-17 DIAGNOSIS — G47.10 HYPERSOMNIA, UNSPECIFIED: ICD-10-CM

## 2025-06-17 DIAGNOSIS — I10 ESSENTIAL HYPERTENSION: Chronic | ICD-10-CM

## 2025-06-17 DIAGNOSIS — E66.01 MORBIDLY OBESE (HCC): ICD-10-CM

## 2025-06-17 DIAGNOSIS — R53.83 OTHER FATIGUE: ICD-10-CM

## 2025-06-17 PROCEDURE — 95806 SLEEP STUDY UNATT&RESP EFFT: CPT

## 2025-06-17 ASSESSMENT — SLEEP AND FATIGUE QUESTIONNAIRES
ESS TOTAL SCORE: 5
HOW LIKELY ARE YOU TO NOD OFF OR FALL ASLEEP WHILE SITTING QUIETLY AFTER LUNCH WITHOUT ALCOHOL: SLIGHT CHANCE OF DOZING
HOW LIKELY ARE YOU TO NOD OFF OR FALL ASLEEP WHILE SITTING AND TALKING TO SOMEONE: WOULD NEVER DOZE
HOW LIKELY ARE YOU TO NOD OFF OR FALL ASLEEP WHILE WATCHING TV: SLIGHT CHANCE OF DOZING
HOW LIKELY ARE YOU TO NOD OFF OR FALL ASLEEP IN A CAR, WHILE STOPPED FOR A FEW MINUTES IN TRAFFIC: WOULD NEVER DOZE
HOW LIKELY ARE YOU TO NOD OFF OR FALL ASLEEP WHILE SITTING AND READING: SLIGHT CHANCE OF DOZING
HOW LIKELY ARE YOU TO NOD OFF OR FALL ASLEEP WHILE LYING DOWN TO REST IN THE AFTERNOON WHEN CIRCUMSTANCES PERMIT: WOULD NEVER DOZE
HOW LIKELY ARE YOU TO NOD OFF OR FALL ASLEEP WHEN YOU ARE A PASSENGER IN A CAR FOR AN HOUR WITHOUT A BREAK: SLIGHT CHANCE OF DOZING
HOW LIKELY ARE YOU TO NOD OFF OR FALL ASLEEP WHILE SITTING INACTIVE IN A PUBLIC PLACE: SLIGHT CHANCE OF DOZING

## 2025-06-23 ENCOUNTER — RESULTS FOLLOW-UP (OUTPATIENT)
Dept: PRIMARY CARE CLINIC | Age: 80
End: 2025-06-23

## 2025-06-23 DIAGNOSIS — G47.33 OSA (OBSTRUCTIVE SLEEP APNEA): Primary | ICD-10-CM

## 2025-06-27 PROBLEM — Z13.220 LIPID SCREENING: Status: RESOLVED | Noted: 2025-05-28 | Resolved: 2025-06-27

## 2025-08-12 ENCOUNTER — OFFICE VISIT (OUTPATIENT)
Dept: PRIMARY CARE CLINIC | Age: 80
End: 2025-08-12
Payer: COMMERCIAL

## 2025-08-12 VITALS
SYSTOLIC BLOOD PRESSURE: 102 MMHG | HEART RATE: 118 BPM | WEIGHT: 167.2 LBS | OXYGEN SATURATION: 94 % | DIASTOLIC BLOOD PRESSURE: 70 MMHG | BODY MASS INDEX: 38.86 KG/M2

## 2025-08-12 DIAGNOSIS — S76.112A STRAIN OF LEFT QUADRICEPS MUSCLE, INITIAL ENCOUNTER: Primary | ICD-10-CM

## 2025-08-12 PROCEDURE — 3078F DIAST BP <80 MM HG: CPT | Performed by: NURSE PRACTITIONER

## 2025-08-12 PROCEDURE — 1090F PRES/ABSN URINE INCON ASSESS: CPT | Performed by: NURSE PRACTITIONER

## 2025-08-12 PROCEDURE — G8417 CALC BMI ABV UP PARAM F/U: HCPCS | Performed by: NURSE PRACTITIONER

## 2025-08-12 PROCEDURE — 1123F ACP DISCUSS/DSCN MKR DOCD: CPT | Performed by: NURSE PRACTITIONER

## 2025-08-12 PROCEDURE — 99211 OFF/OP EST MAY X REQ PHY/QHP: CPT | Performed by: NURSE PRACTITIONER

## 2025-08-12 PROCEDURE — G8427 DOCREV CUR MEDS BY ELIG CLIN: HCPCS | Performed by: NURSE PRACTITIONER

## 2025-08-12 PROCEDURE — 1159F MED LIST DOCD IN RCRD: CPT | Performed by: NURSE PRACTITIONER

## 2025-08-12 PROCEDURE — 3074F SYST BP LT 130 MM HG: CPT | Performed by: NURSE PRACTITIONER

## 2025-08-12 PROCEDURE — 99213 OFFICE O/P EST LOW 20 MIN: CPT | Performed by: NURSE PRACTITIONER

## 2025-08-12 PROCEDURE — G8399 PT W/DXA RESULTS DOCUMENT: HCPCS | Performed by: NURSE PRACTITIONER

## 2025-08-12 PROCEDURE — G2211 COMPLEX E/M VISIT ADD ON: HCPCS | Performed by: NURSE PRACTITIONER

## 2025-08-12 PROCEDURE — 1160F RVW MEDS BY RX/DR IN RCRD: CPT | Performed by: NURSE PRACTITIONER

## 2025-08-12 PROCEDURE — 1036F TOBACCO NON-USER: CPT | Performed by: NURSE PRACTITIONER

## 2025-08-12 RX ORDER — METHYLPREDNISOLONE 4 MG/1
TABLET ORAL
Qty: 1 KIT | Refills: 0 | Status: SHIPPED | OUTPATIENT
Start: 2025-08-12 | End: 2025-08-18

## 2025-08-12 ASSESSMENT — ENCOUNTER SYMPTOMS
COUGH: 0
NAUSEA: 0
DIARRHEA: 0
CHEST TIGHTNESS: 0
WHEEZING: 0
SHORTNESS OF BREATH: 0
VOMITING: 0

## 2025-08-12 ASSESSMENT — PATIENT HEALTH QUESTIONNAIRE - PHQ9
SUM OF ALL RESPONSES TO PHQ QUESTIONS 1-9: 0
2. FEELING DOWN, DEPRESSED OR HOPELESS: NOT AT ALL
SUM OF ALL RESPONSES TO PHQ QUESTIONS 1-9: 0
1. LITTLE INTEREST OR PLEASURE IN DOING THINGS: NOT AT ALL

## 2025-08-19 ENCOUNTER — OFFICE VISIT (OUTPATIENT)
Dept: PRIMARY CARE CLINIC | Age: 80
End: 2025-08-19
Payer: COMMERCIAL

## 2025-08-19 VITALS
OXYGEN SATURATION: 96 % | SYSTOLIC BLOOD PRESSURE: 104 MMHG | DIASTOLIC BLOOD PRESSURE: 62 MMHG | BODY MASS INDEX: 38.07 KG/M2 | WEIGHT: 163.8 LBS | HEART RATE: 106 BPM

## 2025-08-19 DIAGNOSIS — R00.0 TACHYCARDIA: ICD-10-CM

## 2025-08-19 DIAGNOSIS — J30.2 SEASONAL ALLERGIES: ICD-10-CM

## 2025-08-19 DIAGNOSIS — S76.112A STRAIN OF LEFT QUADRICEPS MUSCLE, INITIAL ENCOUNTER: Primary | ICD-10-CM

## 2025-08-19 PROCEDURE — 1159F MED LIST DOCD IN RCRD: CPT | Performed by: NURSE PRACTITIONER

## 2025-08-19 PROCEDURE — 1123F ACP DISCUSS/DSCN MKR DOCD: CPT | Performed by: NURSE PRACTITIONER

## 2025-08-19 PROCEDURE — G8417 CALC BMI ABV UP PARAM F/U: HCPCS | Performed by: NURSE PRACTITIONER

## 2025-08-19 PROCEDURE — 3074F SYST BP LT 130 MM HG: CPT | Performed by: NURSE PRACTITIONER

## 2025-08-19 PROCEDURE — 3078F DIAST BP <80 MM HG: CPT | Performed by: NURSE PRACTITIONER

## 2025-08-19 PROCEDURE — 99214 OFFICE O/P EST MOD 30 MIN: CPT | Performed by: NURSE PRACTITIONER

## 2025-08-19 PROCEDURE — G8399 PT W/DXA RESULTS DOCUMENT: HCPCS | Performed by: NURSE PRACTITIONER

## 2025-08-19 PROCEDURE — 1036F TOBACCO NON-USER: CPT | Performed by: NURSE PRACTITIONER

## 2025-08-19 PROCEDURE — G8427 DOCREV CUR MEDS BY ELIG CLIN: HCPCS | Performed by: NURSE PRACTITIONER

## 2025-08-19 PROCEDURE — G2211 COMPLEX E/M VISIT ADD ON: HCPCS | Performed by: NURSE PRACTITIONER

## 2025-08-19 PROCEDURE — 1090F PRES/ABSN URINE INCON ASSESS: CPT | Performed by: NURSE PRACTITIONER

## 2025-08-19 RX ORDER — LORATADINE 10 MG/1
10 TABLET ORAL DAILY
Qty: 90 TABLET | Refills: 1 | Status: SHIPPED | OUTPATIENT
Start: 2025-08-19

## 2025-08-19 ASSESSMENT — PATIENT HEALTH QUESTIONNAIRE - PHQ9
SUM OF ALL RESPONSES TO PHQ QUESTIONS 1-9: 0
SUM OF ALL RESPONSES TO PHQ QUESTIONS 1-9: 0
1. LITTLE INTEREST OR PLEASURE IN DOING THINGS: NOT AT ALL
2. FEELING DOWN, DEPRESSED OR HOPELESS: NOT AT ALL
SUM OF ALL RESPONSES TO PHQ QUESTIONS 1-9: 0
SUM OF ALL RESPONSES TO PHQ QUESTIONS 1-9: 0

## 2025-08-19 ASSESSMENT — ENCOUNTER SYMPTOMS
WHEEZING: 0
COUGH: 0
BACK PAIN: 1
CHEST TIGHTNESS: 0
VOMITING: 0
DIARRHEA: 0
SHORTNESS OF BREATH: 0
NAUSEA: 0

## (undated) DEVICE — UNDERGLOVE SURG SZ 8 BLU LTX FREE SYN POLYISOPRENE POLYMER

## (undated) DEVICE — FAN SPRAY KIT: Brand: PULSAVAC®

## (undated) DEVICE — SUTURE VCRL SZ 2-0 L36IN ABSRB UD L40MM CT 1/2 CIR J957H

## (undated) DEVICE — TUBING, SUCTION, 9/32" X 12', STRAIGHT: Brand: MEDLINE INDUSTRIES, INC.

## (undated) DEVICE — RESTRAINT POS UNIV HD NK ALIGN DISP FOR SHLDR PROC

## (undated) DEVICE — BLADE SAW W12.5XL70MM THK0.8MM CUT THK1.12MM S STL RECIP

## (undated) DEVICE — PILOT FOR CANNULATED REAMER

## (undated) DEVICE — SUTURE VCRL SZ 1 L36IN ABSRB UD L36MM CT-1 1/2 CIR J947H

## (undated) DEVICE — COVER,MAYO STAND,STERILE: Brand: MEDLINE

## (undated) DEVICE — GLOVE SURG SZ 75 L12IN FNGR THK87MIL WHT LTX FREE

## (undated) DEVICE — PROTECTOR EYE PT SELF ADH NS OPT GRD LF

## (undated) DEVICE — SUTURE TICRN BR BL NDL C-20 SZ 5 30 8886302779

## (undated) DEVICE — Z DISCONTINUED APPLICATOR SURG PREP 0.35OZ 2% CHG 70% ISO ALC W/ HI LT

## (undated) DEVICE — SUTURE VCRL + SZ 1 L36IN ABSRB UD L36MM CT-1 1/2 CIR VCP947H

## (undated) DEVICE — Z DUP USE 2715602 GUIDEWIRE SURG L220MM DIA25MM SHLDR FOR GLEN KEELED AEQUALIS

## (undated) DEVICE — DUAL CUT SAGITTAL BLADE

## (undated) DEVICE — Z DISCONTINUED PER MEDLINE USE 2741943 DRESSING AQUACEL 10 IN ALG W9XL25CM SIL CVR WTRPRF VIR BACT BARR ANTIMIC

## (undated) DEVICE — NEEDLE SUT T-5 L26.5MM 1/2 CIR TAPR W/ NIT LOOP

## (undated) DEVICE — 1000 S-DRAPE TOWEL DRAPE 10/BX: Brand: STERI-DRAPE™

## (undated) DEVICE — ULTRACLEAN ACCESSORY ELECTRODE 4" (10.16 CM) COATED BLADE: Brand: ULTRACLEAN

## (undated) DEVICE — Device

## (undated) DEVICE — GUIDE GLEN REVERSED BLUEPRINT

## (undated) DEVICE — Z DISCONTINUED PER MEDLINE USE 2741942 DRESSING AQUACEL 6 IN ALG W9XL15CM SIL CVR WTRPRF VIR BACT BARR ANTIMIC

## (undated) DEVICE — INTENDED FOR TISSUE SEPARATION, AND OTHER PROCEDURES THAT REQUIRE A SHARP SURGICAL BLADE TO PUNCTURE OR CUT.: Brand: BARD-PARKER ® CARBON RIB-BACK BLADES

## (undated) DEVICE — SOLUTION IV IRRIG POUR BRL 0.9% SODIUM CHL 2F7124

## (undated) DEVICE — STAPLER EXT SKIN 35 WIDE S STL STPL SQUEEZE HNDL VISISTAT

## (undated) DEVICE — SHEET, DRAPE, SPLIT, STERILE: Brand: MEDLINE

## (undated) DEVICE — DRILL BIT

## (undated) DEVICE — DRAPE,U/ SHT,SPLIT,PLAS,STERIL: Brand: MEDLINE

## (undated) DEVICE — 3M™ STERI-DRAPE™ U-DRAPE 1015: Brand: STERI-DRAPE™

## (undated) DEVICE — 3M™ IOBAN™ 2 ANTIMICROBIAL INCISE DRAPE 6648EZ: Brand: IOBAN™ 2

## (undated) DEVICE — SYRINGE, LUER LOCK, 10ML: Brand: MEDLINE

## (undated) DEVICE — SUTURE ETHBND 2 L30IN NONABSORBABLE GRN WHT LT GRN MO-7 D8793